# Patient Record
Sex: MALE | Race: BLACK OR AFRICAN AMERICAN | NOT HISPANIC OR LATINO | Employment: OTHER | URBAN - METROPOLITAN AREA
[De-identification: names, ages, dates, MRNs, and addresses within clinical notes are randomized per-mention and may not be internally consistent; named-entity substitution may affect disease eponyms.]

---

## 2023-02-07 PROBLEM — I48.91 AFIB (MULTI): Status: ACTIVE | Noted: 2023-02-07

## 2023-02-07 PROBLEM — R42 VERTIGO: Status: ACTIVE | Noted: 2023-02-07

## 2023-02-07 PROBLEM — G56.03 CARPAL TUNNEL SYNDROME ON BOTH SIDES: Status: ACTIVE | Noted: 2023-02-07

## 2023-02-07 PROBLEM — M54.16 LUMBAR RADICULOPATHY: Status: ACTIVE | Noted: 2023-02-07

## 2023-02-07 PROBLEM — E78.5 HYPERLIPIDEMIA: Status: ACTIVE | Noted: 2023-02-07

## 2023-02-07 PROBLEM — H53.2 DIPLOPIA: Status: ACTIVE | Noted: 2023-02-07

## 2023-02-07 PROBLEM — H52.4 ASTIGMATISM OF BOTH EYES WITH PRESBYOPIA: Status: ACTIVE | Noted: 2023-02-07

## 2023-02-07 PROBLEM — I10 HYPERTENSION: Status: ACTIVE | Noted: 2023-02-07

## 2023-02-07 PROBLEM — R10.33 ABDOMINAL PAIN, PERIUMBILICAL: Status: ACTIVE | Noted: 2023-02-07

## 2023-02-07 PROBLEM — I35.0 NONRHEUMATIC AORTIC VALVE STENOSIS: Status: ACTIVE | Noted: 2023-02-07

## 2023-02-07 PROBLEM — N40.0 BPH (BENIGN PROSTATIC HYPERPLASIA): Status: ACTIVE | Noted: 2023-02-07

## 2023-02-07 PROBLEM — K63.5 COLON POLYPS: Status: ACTIVE | Noted: 2023-02-07

## 2023-02-07 PROBLEM — R63.4 WEIGHT LOSS: Status: ACTIVE | Noted: 2023-02-07

## 2023-02-07 PROBLEM — M48.061 LUMBAR STENOSIS: Status: ACTIVE | Noted: 2023-02-07

## 2023-02-07 PROBLEM — I25.10 ATHEROSCLEROSIS OF CORONARY ARTERY: Status: ACTIVE | Noted: 2023-02-07

## 2023-02-07 PROBLEM — E55.9 VITAMIN D DEFICIENCY: Status: ACTIVE | Noted: 2023-02-07

## 2023-02-07 PROBLEM — E11.9 DIABETES MELLITUS (MULTI): Status: ACTIVE | Noted: 2023-02-07

## 2023-02-07 PROBLEM — H52.03 HYPERMETROPIA OF BOTH EYES: Status: ACTIVE | Noted: 2023-02-07

## 2023-02-07 PROBLEM — I26.99 MULTIPLE PULMONARY EMBOLI (MULTI): Status: ACTIVE | Noted: 2023-02-07

## 2023-02-07 PROBLEM — K40.90 INGUINAL HERNIA: Status: RESOLVED | Noted: 2023-02-07 | Resolved: 2023-02-07

## 2023-02-07 PROBLEM — R20.0 BILATERAL HAND NUMBNESS: Status: ACTIVE | Noted: 2023-02-07

## 2023-02-07 PROBLEM — M54.2 NECK PAIN: Status: ACTIVE | Noted: 2023-02-07

## 2023-02-07 PROBLEM — N52.9 ERECTILE DYSFUNCTION: Status: ACTIVE | Noted: 2023-02-07

## 2023-02-07 PROBLEM — H52.203 ASTIGMATISM OF BOTH EYES WITH PRESBYOPIA: Status: ACTIVE | Noted: 2023-02-07

## 2023-02-07 PROBLEM — K92.1 MELENA: Status: ACTIVE | Noted: 2023-02-07

## 2023-02-07 PROBLEM — I82.409 DVT (DEEP VENOUS THROMBOSIS) (MULTI): Status: ACTIVE | Noted: 2023-02-07

## 2023-02-07 PROBLEM — M47.12 OTHER SPONDYLOSIS WITH MYELOPATHY, CERVICAL REGION: Status: RESOLVED | Noted: 2023-02-07 | Resolved: 2023-02-07

## 2023-02-07 PROBLEM — E87.6 HYPOKALEMIA: Status: ACTIVE | Noted: 2023-02-07

## 2023-02-07 PROBLEM — K21.9 GERD WITHOUT ESOPHAGITIS: Status: ACTIVE | Noted: 2023-02-07

## 2023-02-07 PROBLEM — K76.0 FATTY LIVER: Status: ACTIVE | Noted: 2023-02-07

## 2023-02-07 PROBLEM — K42.9 UMBILICAL HERNIA: Status: ACTIVE | Noted: 2023-02-07

## 2023-02-07 PROBLEM — E66.9 OBESITY (BMI 30-39.9): Status: ACTIVE | Noted: 2023-02-07

## 2023-02-07 PROBLEM — K92.2 UPPER GI BLEED: Status: ACTIVE | Noted: 2023-02-07

## 2023-02-07 PROBLEM — K22.6 MALLORY-WEISS TEAR: Status: ACTIVE | Noted: 2023-02-07

## 2023-02-07 PROBLEM — Z95.2 S/P AVR: Status: ACTIVE | Noted: 2023-02-07

## 2023-02-07 PROBLEM — R39.15 URGENCY OF URINATION: Status: ACTIVE | Noted: 2023-02-07

## 2023-02-07 PROBLEM — M48.02 STENOSIS, CERVICAL SPINE: Status: RESOLVED | Noted: 2023-02-07 | Resolved: 2023-02-07

## 2023-02-07 PROBLEM — M54.12 CERVICAL RADICULOPATHY: Status: ACTIVE | Noted: 2023-02-07

## 2023-02-07 PROBLEM — M54.50 LOW BACK PAIN: Status: ACTIVE | Noted: 2023-02-07

## 2023-02-07 PROBLEM — I63.9 CEREBROVASCULAR ACCIDENT (CVA) (MULTI): Status: ACTIVE | Noted: 2023-02-07

## 2023-02-07 PROBLEM — D35.00 ADRENAL ADENOMA: Status: ACTIVE | Noted: 2023-02-07

## 2023-02-07 RX ORDER — FINASTERIDE 5 MG/1
1 TABLET, FILM COATED ORAL DAILY
COMMUNITY
Start: 2019-04-19 | End: 2023-05-15 | Stop reason: SDUPTHER

## 2023-02-07 RX ORDER — AMLODIPINE BESYLATE 5 MG/1
5 TABLET ORAL DAILY
COMMUNITY
Start: 2018-06-06 | End: 2023-07-16

## 2023-02-07 RX ORDER — TOPIRAMATE 100 MG/1
100 TABLET, FILM COATED ORAL 2 TIMES DAILY
COMMUNITY
Start: 2021-03-22 | End: 2023-10-06

## 2023-02-07 RX ORDER — SPIRONOLACTONE 25 MG/1
100 TABLET ORAL DAILY
COMMUNITY
Start: 2020-07-01 | End: 2023-03-16 | Stop reason: SDUPTHER

## 2023-02-07 RX ORDER — ATORVASTATIN CALCIUM 80 MG/1
1 TABLET, FILM COATED ORAL DAILY
COMMUNITY
Start: 2013-07-20 | End: 2023-05-16 | Stop reason: SDUPTHER

## 2023-02-07 RX ORDER — EZETIMIBE 10 MG/1
1 TABLET ORAL DAILY
COMMUNITY
Start: 2022-03-25 | End: 2023-08-22 | Stop reason: SDUPTHER

## 2023-02-07 RX ORDER — CHLORTHALIDONE 25 MG/1
1 TABLET ORAL DAILY
COMMUNITY
Start: 2022-03-25 | End: 2023-08-22 | Stop reason: SDUPTHER

## 2023-02-07 RX ORDER — LOSARTAN POTASSIUM 100 MG/1
100 TABLET ORAL DAILY
COMMUNITY
Start: 2018-03-29 | End: 2023-05-15 | Stop reason: SDUPTHER

## 2023-02-07 RX ORDER — PANTOPRAZOLE SODIUM 40 MG/1
1 TABLET, DELAYED RELEASE ORAL DAILY
COMMUNITY
Start: 2020-10-12 | End: 2023-07-16

## 2023-03-16 DIAGNOSIS — I10 PRIMARY HYPERTENSION: Primary | ICD-10-CM

## 2023-03-16 RX ORDER — SPIRONOLACTONE 25 MG/1
100 TABLET ORAL DAILY
Qty: 30 TABLET | Refills: 0 | Status: SHIPPED | OUTPATIENT
Start: 2023-03-16 | End: 2023-08-22 | Stop reason: SDUPTHER

## 2023-04-06 ENCOUNTER — OFFICE VISIT (OUTPATIENT)
Dept: PRIMARY CARE | Facility: CLINIC | Age: 78
End: 2023-04-06
Payer: MEDICARE

## 2023-04-06 ENCOUNTER — APPOINTMENT (OUTPATIENT)
Dept: PRIMARY CARE | Facility: CLINIC | Age: 78
End: 2023-04-06
Payer: MEDICARE

## 2023-04-06 VITALS
WEIGHT: 235.5 LBS | DIASTOLIC BLOOD PRESSURE: 69 MMHG | TEMPERATURE: 97.1 F | BODY MASS INDEX: 32.97 KG/M2 | HEIGHT: 71 IN | SYSTOLIC BLOOD PRESSURE: 123 MMHG | HEART RATE: 74 BPM

## 2023-04-06 DIAGNOSIS — N40.1 BENIGN PROSTATIC HYPERPLASIA WITH LOWER URINARY TRACT SYMPTOMS, SYMPTOM DETAILS UNSPECIFIED: ICD-10-CM

## 2023-04-06 DIAGNOSIS — I48.11 LONGSTANDING PERSISTENT ATRIAL FIBRILLATION (MULTI): ICD-10-CM

## 2023-04-06 DIAGNOSIS — Z95.2 S/P AVR: ICD-10-CM

## 2023-04-06 DIAGNOSIS — Z79.4 TYPE 2 DIABETES MELLITUS WITH OTHER SPECIFIED COMPLICATION, WITH LONG-TERM CURRENT USE OF INSULIN (MULTI): Primary | ICD-10-CM

## 2023-04-06 DIAGNOSIS — M54.16 LUMBAR RADICULOPATHY: ICD-10-CM

## 2023-04-06 DIAGNOSIS — K21.9 GERD WITHOUT ESOPHAGITIS: ICD-10-CM

## 2023-04-06 DIAGNOSIS — Z12.11 ENCOUNTER FOR SCREENING FOR MALIGNANT NEOPLASM OF COLON: ICD-10-CM

## 2023-04-06 DIAGNOSIS — I10 PRIMARY HYPERTENSION: ICD-10-CM

## 2023-04-06 DIAGNOSIS — R20.0 BILATERAL HAND NUMBNESS: ICD-10-CM

## 2023-04-06 DIAGNOSIS — I25.83 CORONARY ATHEROSCLEROSIS DUE TO LIPID RICH PLAQUE: ICD-10-CM

## 2023-04-06 DIAGNOSIS — E11.69 TYPE 2 DIABETES MELLITUS WITH OTHER SPECIFIED COMPLICATION, WITH LONG-TERM CURRENT USE OF INSULIN (MULTI): Primary | ICD-10-CM

## 2023-04-06 DIAGNOSIS — I63.9 CEREBROVASCULAR ACCIDENT (CVA), UNSPECIFIED MECHANISM (MULTI): ICD-10-CM

## 2023-04-06 DIAGNOSIS — I25.10 CORONARY ATHEROSCLEROSIS DUE TO LIPID RICH PLAQUE: ICD-10-CM

## 2023-04-06 DIAGNOSIS — Z12.5 PROSTATE CANCER SCREENING: ICD-10-CM

## 2023-04-06 PROCEDURE — 1036F TOBACCO NON-USER: CPT | Performed by: FAMILY MEDICINE

## 2023-04-06 PROCEDURE — 4010F ACE/ARB THERAPY RXD/TAKEN: CPT | Performed by: FAMILY MEDICINE

## 2023-04-06 PROCEDURE — 99214 OFFICE O/P EST MOD 30 MIN: CPT | Performed by: FAMILY MEDICINE

## 2023-04-06 PROCEDURE — 3078F DIAST BP <80 MM HG: CPT | Performed by: FAMILY MEDICINE

## 2023-04-06 PROCEDURE — 3074F SYST BP LT 130 MM HG: CPT | Performed by: FAMILY MEDICINE

## 2023-04-06 PROCEDURE — 1159F MED LIST DOCD IN RCRD: CPT | Performed by: FAMILY MEDICINE

## 2023-04-06 ASSESSMENT — PAIN SCALES - GENERAL: PAINLEVEL: 0-NO PAIN

## 2023-04-06 NOTE — PROGRESS NOTES
Subjective   Patient ID: Cristy Forte is a 77 y.o. male who presents for No chief complaint on file..  HPI  The patient is here to establish care and for medication refills.     A review of system was completed.  All systems were reviewed and were normal, except for the ones that are listed in the HPI.    Objective   Physical Exam  Constitutional:       Appearance: Normal appearance.   HENT:      Head: Normocephalic and atraumatic.      Right Ear: Tympanic membrane, ear canal and external ear normal.      Left Ear: Tympanic membrane, ear canal and external ear normal.      Nose: Nose normal.      Mouth/Throat:      Mouth: Mucous membranes are moist.      Pharynx: Oropharynx is clear.   Eyes:      Extraocular Movements: Extraocular movements intact.      Conjunctiva/sclera: Conjunctivae normal.      Pupils: Pupils are equal, round, and reactive to light.   Cardiovascular:      Rate and Rhythm: Normal rate and regular rhythm.      Pulses: Normal pulses.   Pulmonary:      Effort: Pulmonary effort is normal.      Breath sounds: Normal breath sounds.   Abdominal:      General: Abdomen is flat. Bowel sounds are normal.      Palpations: Abdomen is soft.   Musculoskeletal:         General: Normal range of motion.      Cervical back: Normal range of motion and neck supple.   Skin:     General: Skin is warm.   Neurological:      General: No focal deficit present.      Mental Status: He is alert and oriented to person, place, and time. Mental status is at baseline.   Psychiatric:         Mood and Affect: Mood normal.         Behavior: Behavior normal.         Thought Content: Thought content normal.         Judgment: Judgment normal.         Assessment/Plan   Problem List Items Addressed This Visit          Nervous    Bilateral hand numbness    Cerebrovascular accident (CVA) (CMS/[facility])    Lumbar radiculopathy       Circulatory    Afib (CMS/[facility])    Atherosclerosis of coronary artery    Hypertension    S/P AVR        Digestive    GERD without esophagitis       Genitourinary    BPH (benign prostatic hyperplasia)       Endocrine/Metabolic    Diabetes mellitus (CMS/HCC) - Primary    Relevant Orders    Hemoglobin A1C    Lipid Panel    TSH with reflex to Free T4 if abnormal    CBC    Comprehensive Metabolic Panel    Albumin , Urine Random    Referral to Diabetic Education       Other    Encounter for screening for malignant neoplasm of colon    Relevant Orders    Fecal Occult Blood Immunoassy    Prostate cancer screening    Relevant Orders    PSA, Total and Free

## 2023-04-21 ENCOUNTER — LAB (OUTPATIENT)
Dept: LAB | Facility: LAB | Age: 78
End: 2023-04-21
Payer: MEDICARE

## 2023-04-21 DIAGNOSIS — D64.9 ANEMIA, UNSPECIFIED TYPE: Primary | ICD-10-CM

## 2023-04-21 DIAGNOSIS — E11.69 TYPE 2 DIABETES MELLITUS WITH OTHER SPECIFIED COMPLICATION, WITH LONG-TERM CURRENT USE OF INSULIN (MULTI): ICD-10-CM

## 2023-04-21 DIAGNOSIS — Z12.5 PROSTATE CANCER SCREENING: ICD-10-CM

## 2023-04-21 DIAGNOSIS — Z79.4 TYPE 2 DIABETES MELLITUS WITH OTHER SPECIFIED COMPLICATION, WITH LONG-TERM CURRENT USE OF INSULIN (MULTI): ICD-10-CM

## 2023-04-21 LAB
ALANINE AMINOTRANSFERASE (SGPT) (U/L) IN SER/PLAS: 29 U/L (ref 10–52)
ALBUMIN (G/DL) IN SER/PLAS: 4.1 G/DL (ref 3.4–5)
ALBUMIN (MG/L) IN URINE: <7 MG/L
ALBUMIN/CREATININE (UG/MG) IN URINE: NORMAL UG/MG CRT (ref 0–30)
ALKALINE PHOSPHATASE (U/L) IN SER/PLAS: 60 U/L (ref 33–136)
ANION GAP IN SER/PLAS: 12 MMOL/L (ref 10–20)
ASPARTATE AMINOTRANSFERASE (SGOT) (U/L) IN SER/PLAS: 25 U/L (ref 9–39)
BILIRUBIN TOTAL (MG/DL) IN SER/PLAS: 0.6 MG/DL (ref 0–1.2)
CALCIUM (MG/DL) IN SER/PLAS: 8.7 MG/DL (ref 8.6–10.6)
CARBON DIOXIDE, TOTAL (MMOL/L) IN SER/PLAS: 28 MMOL/L (ref 21–32)
CHLORIDE (MMOL/L) IN SER/PLAS: 106 MMOL/L (ref 98–107)
CHOLESTEROL (MG/DL) IN SER/PLAS: 103 MG/DL (ref 0–199)
CHOLESTEROL IN HDL (MG/DL) IN SER/PLAS: 42 MG/DL
CHOLESTEROL/HDL RATIO: 2.5
CREATININE (MG/DL) IN SER/PLAS: 1.22 MG/DL (ref 0.5–1.3)
CREATININE (MG/DL) IN URINE: 202 MG/DL (ref 20–370)
ERYTHROCYTE DISTRIBUTION WIDTH (RATIO) BY AUTOMATED COUNT: 15.8 % (ref 11.5–14.5)
ERYTHROCYTE MEAN CORPUSCULAR HEMOGLOBIN CONCENTRATION (G/DL) BY AUTOMATED: 31.2 G/DL (ref 32–36)
ERYTHROCYTE MEAN CORPUSCULAR VOLUME (FL) BY AUTOMATED COUNT: 82 FL (ref 80–100)
ERYTHROCYTES (10*6/UL) IN BLOOD BY AUTOMATED COUNT: 5.1 X10E12/L (ref 4.5–5.9)
ESTIMATED AVERAGE GLUCOSE FOR HBA1C: 137 MG/DL
GFR MALE: 62 ML/MIN/1.73M2
GLUCOSE (MG/DL) IN SER/PLAS: 101 MG/DL (ref 74–99)
HEMATOCRIT (%) IN BLOOD BY AUTOMATED COUNT: 41.7 % (ref 41–52)
HEMOGLOBIN (G/DL) IN BLOOD: 13 G/DL (ref 13.5–17.5)
HEMOGLOBIN A1C/HEMOGLOBIN TOTAL IN BLOOD: 6.4 %
LDL: 42 MG/DL (ref 0–99)
LEUKOCYTES (10*3/UL) IN BLOOD BY AUTOMATED COUNT: 4.9 X10E9/L (ref 4.4–11.3)
NRBC (PER 100 WBCS) BY AUTOMATED COUNT: 0 /100 WBC (ref 0–0)
PLATELETS (10*3/UL) IN BLOOD AUTOMATED COUNT: 155 X10E9/L (ref 150–450)
POTASSIUM (MMOL/L) IN SER/PLAS: 2.9 MMOL/L (ref 3.5–5.3)
PROTEIN TOTAL: 6.8 G/DL (ref 6.4–8.2)
SODIUM (MMOL/L) IN SER/PLAS: 143 MMOL/L (ref 136–145)
THYROTROPIN (MIU/L) IN SER/PLAS BY DETECTION LIMIT <= 0.05 MIU/L: 2.52 MIU/L (ref 0.44–3.98)
TRIGLYCERIDE (MG/DL) IN SER/PLAS: 93 MG/DL (ref 0–149)
UREA NITROGEN (MG/DL) IN SER/PLAS: 16 MG/DL (ref 6–23)
VLDL: 19 MG/DL (ref 0–40)

## 2023-04-21 PROCEDURE — 80053 COMPREHEN METABOLIC PANEL: CPT

## 2023-04-21 PROCEDURE — 82043 UR ALBUMIN QUANTITATIVE: CPT

## 2023-04-21 PROCEDURE — 36415 COLL VENOUS BLD VENIPUNCTURE: CPT

## 2023-04-21 PROCEDURE — 83036 HEMOGLOBIN GLYCOSYLATED A1C: CPT

## 2023-04-21 PROCEDURE — 84154 ASSAY OF PSA FREE: CPT

## 2023-04-21 PROCEDURE — 82570 ASSAY OF URINE CREATININE: CPT

## 2023-04-21 PROCEDURE — G0103 PSA SCREENING: HCPCS

## 2023-04-21 PROCEDURE — 80061 LIPID PANEL: CPT

## 2023-04-21 PROCEDURE — 84443 ASSAY THYROID STIM HORMONE: CPT

## 2023-04-21 PROCEDURE — 85027 COMPLETE CBC AUTOMATED: CPT

## 2023-04-24 LAB
PROSTATE SPECIFIC AG (NG/ML) IN SER/PLAS: 0.8 NG/ML (ref 0–4)
PROSTATE SPECIFIC AG FREE (NG/ML) IN SER/PLAS: 0.4 NG/ML
PROSTATE SPECIFIC AG FREE/PROSTATE SPECIFIC AG TOTAL IN SER/PLAS: 50 %

## 2023-05-12 ENCOUNTER — APPOINTMENT (OUTPATIENT)
Dept: PRIMARY CARE | Facility: CLINIC | Age: 78
End: 2023-05-12
Payer: MEDICARE

## 2023-05-15 DIAGNOSIS — I25.83 CORONARY ATHEROSCLEROSIS DUE TO LIPID RICH PLAQUE: ICD-10-CM

## 2023-05-15 DIAGNOSIS — I10 PRIMARY HYPERTENSION: Primary | ICD-10-CM

## 2023-05-15 DIAGNOSIS — I25.10 CORONARY ATHEROSCLEROSIS DUE TO LIPID RICH PLAQUE: ICD-10-CM

## 2023-05-15 DIAGNOSIS — N40.1 BENIGN PROSTATIC HYPERPLASIA WITH LOWER URINARY TRACT SYMPTOMS, SYMPTOM DETAILS UNSPECIFIED: ICD-10-CM

## 2023-05-15 NOTE — TELEPHONE ENCOUNTER
[facility] CALLED REQUESTING A REFILL FOR LOSARTAN 100MG , ATORVASTATIN 80 MG AND FINASTERIDE 5MG.

## 2023-05-18 RX ORDER — LOSARTAN POTASSIUM 100 MG/1
100 TABLET ORAL DAILY
Qty: 90 TABLET | Refills: 1 | Status: SHIPPED | OUTPATIENT
Start: 2023-05-18 | End: 2023-08-22

## 2023-05-18 RX ORDER — FINASTERIDE 5 MG/1
5 TABLET, FILM COATED ORAL DAILY
Qty: 90 TABLET | Refills: 1 | Status: SHIPPED | OUTPATIENT
Start: 2023-05-18 | End: 2023-10-06 | Stop reason: SDUPTHER

## 2023-05-18 RX ORDER — ATORVASTATIN CALCIUM 80 MG/1
80 TABLET, FILM COATED ORAL DAILY
Qty: 90 TABLET | Refills: 1 | Status: SHIPPED | OUTPATIENT
Start: 2023-05-18 | End: 2023-10-06 | Stop reason: SDUPTHER

## 2023-06-06 DIAGNOSIS — I10 PRIMARY HYPERTENSION: Primary | ICD-10-CM

## 2023-06-06 RX ORDER — POTASSIUM CHLORIDE 1500 MG/1
20 TABLET, EXTENDED RELEASE ORAL DAILY
COMMUNITY
End: 2023-06-06 | Stop reason: SDUPTHER

## 2023-06-11 RX ORDER — POTASSIUM CHLORIDE 1500 MG/1
20 TABLET, EXTENDED RELEASE ORAL DAILY
Qty: 90 TABLET | Refills: 1 | Status: SHIPPED | OUTPATIENT
Start: 2023-06-11 | End: 2023-07-24 | Stop reason: SDUPTHER

## 2023-07-24 ENCOUNTER — TELEPHONE (OUTPATIENT)
Dept: PRIMARY CARE | Facility: CLINIC | Age: 78
End: 2023-07-24
Payer: MEDICARE

## 2023-08-22 DIAGNOSIS — I10 PRIMARY HYPERTENSION: ICD-10-CM

## 2023-08-22 RX ORDER — SPIRONOLACTONE 25 MG/1
25 TABLET ORAL DAILY
Qty: 90 TABLET | Refills: 1 | Status: SHIPPED | OUTPATIENT
Start: 2023-08-22 | End: 2023-10-06 | Stop reason: SDUPTHER

## 2023-10-06 ENCOUNTER — OFFICE VISIT (OUTPATIENT)
Dept: PRIMARY CARE | Facility: CLINIC | Age: 78
End: 2023-10-06
Payer: MEDICARE

## 2023-10-06 VITALS
HEART RATE: 71 BPM | TEMPERATURE: 98 F | DIASTOLIC BLOOD PRESSURE: 66 MMHG | SYSTOLIC BLOOD PRESSURE: 111 MMHG | BODY MASS INDEX: 31.97 KG/M2 | WEIGHT: 226 LBS

## 2023-10-06 DIAGNOSIS — I10 PRIMARY HYPERTENSION: ICD-10-CM

## 2023-10-06 DIAGNOSIS — K21.9 GERD WITHOUT ESOPHAGITIS: ICD-10-CM

## 2023-10-06 DIAGNOSIS — E78.00 PURE HYPERCHOLESTEROLEMIA: ICD-10-CM

## 2023-10-06 DIAGNOSIS — Z00.00 MEDICARE ANNUAL WELLNESS VISIT, SUBSEQUENT: Primary | ICD-10-CM

## 2023-10-06 DIAGNOSIS — N40.1 BENIGN PROSTATIC HYPERPLASIA WITH LOWER URINARY TRACT SYMPTOMS, SYMPTOM DETAILS UNSPECIFIED: ICD-10-CM

## 2023-10-06 DIAGNOSIS — M54.12 CERVICAL RADICULOPATHY: ICD-10-CM

## 2023-10-06 DIAGNOSIS — E11.69 TYPE 2 DIABETES MELLITUS WITH OTHER SPECIFIED COMPLICATION, WITH LONG-TERM CURRENT USE OF INSULIN (MULTI): ICD-10-CM

## 2023-10-06 DIAGNOSIS — I25.83 CORONARY ATHEROSCLEROSIS DUE TO LIPID RICH PLAQUE: ICD-10-CM

## 2023-10-06 DIAGNOSIS — M25.50 ARTHRALGIA OF MULTIPLE JOINTS: ICD-10-CM

## 2023-10-06 DIAGNOSIS — Z12.5 PROSTATE CANCER SCREENING: ICD-10-CM

## 2023-10-06 DIAGNOSIS — I26.99 MULTIPLE PULMONARY EMBOLI (MULTI): ICD-10-CM

## 2023-10-06 DIAGNOSIS — Z23 IMMUNIZATION DUE: ICD-10-CM

## 2023-10-06 DIAGNOSIS — I25.10 CORONARY ATHEROSCLEROSIS DUE TO LIPID RICH PLAQUE: ICD-10-CM

## 2023-10-06 DIAGNOSIS — Z79.4 TYPE 2 DIABETES MELLITUS WITH OTHER SPECIFIED COMPLICATION, WITH LONG-TERM CURRENT USE OF INSULIN (MULTI): ICD-10-CM

## 2023-10-06 DIAGNOSIS — I63.9 CEREBROVASCULAR ACCIDENT (CVA), UNSPECIFIED MECHANISM (MULTI): ICD-10-CM

## 2023-10-06 PROCEDURE — 3074F SYST BP LT 130 MM HG: CPT | Performed by: FAMILY MEDICINE

## 2023-10-06 PROCEDURE — 1159F MED LIST DOCD IN RCRD: CPT | Performed by: FAMILY MEDICINE

## 2023-10-06 PROCEDURE — 4010F ACE/ARB THERAPY RXD/TAKEN: CPT | Performed by: FAMILY MEDICINE

## 2023-10-06 PROCEDURE — 1036F TOBACCO NON-USER: CPT | Performed by: FAMILY MEDICINE

## 2023-10-06 PROCEDURE — G0439 PPPS, SUBSEQ VISIT: HCPCS | Performed by: FAMILY MEDICINE

## 2023-10-06 PROCEDURE — 3044F HG A1C LEVEL LT 7.0%: CPT | Performed by: FAMILY MEDICINE

## 2023-10-06 PROCEDURE — 1126F AMNT PAIN NOTED NONE PRSNT: CPT | Performed by: FAMILY MEDICINE

## 2023-10-06 PROCEDURE — 3078F DIAST BP <80 MM HG: CPT | Performed by: FAMILY MEDICINE

## 2023-10-06 RX ORDER — GABAPENTIN 100 MG/1
100 CAPSULE ORAL 2 TIMES DAILY
Qty: 120 CAPSULE | Refills: 5 | Status: SHIPPED | OUTPATIENT
Start: 2023-10-06 | End: 2023-12-05 | Stop reason: SDUPTHER

## 2023-10-06 RX ORDER — SPIRONOLACTONE 25 MG/1
25 TABLET ORAL DAILY
Qty: 90 TABLET | Refills: 1 | Status: SHIPPED | OUTPATIENT
Start: 2023-10-06 | End: 2023-12-05 | Stop reason: SDUPTHER

## 2023-10-06 RX ORDER — CHLORTHALIDONE 25 MG/1
25 TABLET ORAL DAILY
Qty: 90 TABLET | Refills: 1 | Status: SHIPPED | OUTPATIENT
Start: 2023-10-06 | End: 2024-01-18

## 2023-10-06 RX ORDER — TOPIRAMATE 25 MG/1
25 TABLET ORAL 2 TIMES DAILY
Qty: 60 TABLET | Refills: 5 | Status: SHIPPED | OUTPATIENT
Start: 2023-10-06 | End: 2023-10-06

## 2023-10-06 RX ORDER — AMLODIPINE BESYLATE 5 MG/1
5 TABLET ORAL DAILY
Qty: 90 TABLET | Refills: 0 | Status: SHIPPED | OUTPATIENT
Start: 2023-10-06 | End: 2023-11-10

## 2023-10-06 RX ORDER — METFORMIN HYDROCHLORIDE 500 MG/1
500 TABLET, EXTENDED RELEASE ORAL
Qty: 90 TABLET | Refills: 1 | Status: SHIPPED | OUTPATIENT
Start: 2023-10-06 | End: 2023-12-05 | Stop reason: SDUPTHER

## 2023-10-06 RX ORDER — FINASTERIDE 5 MG/1
5 TABLET, FILM COATED ORAL DAILY
Qty: 90 TABLET | Refills: 1 | Status: SHIPPED | OUTPATIENT
Start: 2023-10-06 | End: 2024-05-20

## 2023-10-06 RX ORDER — PANTOPRAZOLE SODIUM 40 MG/1
40 TABLET, DELAYED RELEASE ORAL
Qty: 90 TABLET | Refills: 1 | Status: SHIPPED | OUTPATIENT
Start: 2023-10-06 | End: 2023-12-05 | Stop reason: SDUPTHER

## 2023-10-06 RX ORDER — POTASSIUM CHLORIDE 20 MEQ/1
20 TABLET, EXTENDED RELEASE ORAL DAILY
Qty: 90 TABLET | Refills: 0 | Status: SHIPPED | OUTPATIENT
Start: 2023-10-06 | End: 2024-01-17

## 2023-10-06 RX ORDER — EZETIMIBE 10 MG/1
10 TABLET ORAL DAILY
Qty: 30 TABLET | Refills: 0 | Status: SHIPPED | OUTPATIENT
Start: 2023-10-06 | End: 2023-12-02

## 2023-10-06 RX ORDER — LOSARTAN POTASSIUM 100 MG/1
100 TABLET ORAL DAILY
Qty: 90 TABLET | Refills: 1 | Status: SHIPPED | OUTPATIENT
Start: 2023-10-06 | End: 2024-05-20

## 2023-10-06 RX ORDER — ATORVASTATIN CALCIUM 80 MG/1
80 TABLET, FILM COATED ORAL DAILY
Qty: 90 TABLET | Refills: 1 | Status: SHIPPED | OUTPATIENT
Start: 2023-10-06 | End: 2023-12-05 | Stop reason: SDUPTHER

## 2023-10-06 NOTE — ASSESSMENT & PLAN NOTE
-Blood test ordered.  -Influenza, COVID and RSV UTD.  Shingrix and Tdap sent to his pharmacy.  -Eye exam 2023.  -Colonoscopy 2021.  No need for repeat per GI.

## 2023-10-06 NOTE — ASSESSMENT & PLAN NOTE
-Topiramate stopped- ineffective.  -Gabapentin 100 mg to titrate up to 300 mg BID started today.  Side effects discussed.   -4- 6 weeks follow-up recommended.

## 2023-10-06 NOTE — ASSESSMENT & PLAN NOTE
-eye exam 7/2023.  -feet intact.  -Blood and urine test ordered.  -Immunizations discussed in the MWV section.  -Metformin  mg QD started today.

## 2023-10-11 ENCOUNTER — LAB (OUTPATIENT)
Dept: LAB | Facility: LAB | Age: 78
End: 2023-10-11
Payer: MEDICARE

## 2023-10-11 DIAGNOSIS — Z12.5 PROSTATE CANCER SCREENING: ICD-10-CM

## 2023-10-11 DIAGNOSIS — D64.9 ANEMIA, UNSPECIFIED TYPE: ICD-10-CM

## 2023-10-11 DIAGNOSIS — E11.69 TYPE 2 DIABETES MELLITUS WITH OTHER SPECIFIED COMPLICATION, WITH LONG-TERM CURRENT USE OF INSULIN (MULTI): ICD-10-CM

## 2023-10-11 DIAGNOSIS — Z79.4 TYPE 2 DIABETES MELLITUS WITH OTHER SPECIFIED COMPLICATION, WITH LONG-TERM CURRENT USE OF INSULIN (MULTI): ICD-10-CM

## 2023-10-11 DIAGNOSIS — M25.50 ARTHRALGIA OF MULTIPLE JOINTS: ICD-10-CM

## 2023-10-11 LAB
ALBUMIN SERPL BCP-MCNC: 4.1 G/DL (ref 3.4–5)
ALP SERPL-CCNC: 66 U/L (ref 33–136)
ALT SERPL W P-5'-P-CCNC: 30 U/L (ref 10–52)
ANION GAP SERPL CALC-SCNC: 14 MMOL/L (ref 10–20)
AST SERPL W P-5'-P-CCNC: 27 U/L (ref 9–39)
BILIRUB SERPL-MCNC: 0.6 MG/DL (ref 0–1.2)
BUN SERPL-MCNC: 17 MG/DL (ref 6–23)
CALCIUM SERPL-MCNC: 9.6 MG/DL (ref 8.6–10.6)
CHLORIDE SERPL-SCNC: 104 MMOL/L (ref 98–107)
CHOLEST SERPL-MCNC: 111 MG/DL (ref 0–199)
CHOLESTEROL/HDL RATIO: 2.5
CO2 SERPL-SCNC: 30 MMOL/L (ref 21–32)
CREAT SERPL-MCNC: 1.02 MG/DL (ref 0.5–1.3)
CREAT UR-MCNC: 138.6 MG/DL (ref 20–370)
CRP SERPL-MCNC: 0.24 MG/DL
ERYTHROCYTE [DISTWIDTH] IN BLOOD BY AUTOMATED COUNT: 15.5 % (ref 11.5–14.5)
ERYTHROCYTE [SEDIMENTATION RATE] IN BLOOD BY WESTERGREN METHOD: 21 MM/H (ref 0–20)
EST. AVERAGE GLUCOSE BLD GHB EST-MCNC: 134 MG/DL
GFR SERPL CREATININE-BSD FRML MDRD: 77 ML/MIN/1.73M*2
GLUCOSE SERPL-MCNC: 101 MG/DL (ref 74–99)
HBA1C MFR BLD: 6.3 %
HCT VFR BLD AUTO: 45.1 % (ref 41–52)
HDLC SERPL-MCNC: 44.7 MG/DL
HGB BLD-MCNC: 13.7 G/DL (ref 13.5–17.5)
LDLC SERPL CALC-MCNC: 49 MG/DL (ref 140–190)
MCH RBC QN AUTO: 25.6 PG (ref 26–34)
MCHC RBC AUTO-ENTMCNC: 30.4 G/DL (ref 32–36)
MCV RBC AUTO: 84 FL (ref 80–100)
MICROALBUMIN UR-MCNC: <7 MG/L
MICROALBUMIN/CREAT UR: NORMAL MG/G{CREAT}
NON HDL CHOLESTEROL: 66 MG/DL (ref 0–149)
NRBC BLD-RTO: 0 /100 WBCS (ref 0–0)
PLATELET # BLD AUTO: 179 X10*3/UL (ref 150–450)
PMV BLD AUTO: 11.2 FL (ref 7.5–11.5)
POTASSIUM SERPL-SCNC: 3.8 MMOL/L (ref 3.5–5.3)
PROT SERPL-MCNC: 7.1 G/DL (ref 6.4–8.2)
PSA SERPL-MCNC: 0.95 NG/ML
RBC # BLD AUTO: 5.35 X10*6/UL (ref 4.5–5.9)
RHEUMATOID FACT SER NEPH-ACNC: <10 IU/ML (ref 0–15)
SODIUM SERPL-SCNC: 144 MMOL/L (ref 136–145)
TRIGL SERPL-MCNC: 88 MG/DL (ref 0–149)
TSH SERPL-ACNC: 1.98 MIU/L (ref 0.44–3.98)
URATE SERPL-MCNC: 4.6 MG/DL (ref 4–7.5)
VLDL: 18 MG/DL (ref 0–40)
WBC # BLD AUTO: 6.2 X10*3/UL (ref 4.4–11.3)

## 2023-10-11 PROCEDURE — 80053 COMPREHEN METABOLIC PANEL: CPT

## 2023-10-11 PROCEDURE — 82570 ASSAY OF URINE CREATININE: CPT

## 2023-10-11 PROCEDURE — 85027 COMPLETE CBC AUTOMATED: CPT

## 2023-10-11 PROCEDURE — 86225 DNA ANTIBODY NATIVE: CPT

## 2023-10-11 PROCEDURE — 84550 ASSAY OF BLOOD/URIC ACID: CPT

## 2023-10-11 PROCEDURE — 86431 RHEUMATOID FACTOR QUANT: CPT

## 2023-10-11 PROCEDURE — 36415 COLL VENOUS BLD VENIPUNCTURE: CPT

## 2023-10-11 PROCEDURE — 83036 HEMOGLOBIN GLYCOSYLATED A1C: CPT

## 2023-10-11 PROCEDURE — G0103 PSA SCREENING: HCPCS

## 2023-10-11 PROCEDURE — 82043 UR ALBUMIN QUANTITATIVE: CPT

## 2023-10-11 PROCEDURE — 86140 C-REACTIVE PROTEIN: CPT

## 2023-10-11 PROCEDURE — 84443 ASSAY THYROID STIM HORMONE: CPT

## 2023-10-11 PROCEDURE — 80061 LIPID PANEL: CPT

## 2023-10-11 PROCEDURE — 86235 NUCLEAR ANTIGEN ANTIBODY: CPT

## 2023-10-11 PROCEDURE — 86038 ANTINUCLEAR ANTIBODIES: CPT

## 2023-10-11 PROCEDURE — 85652 RBC SED RATE AUTOMATED: CPT

## 2023-10-12 LAB
ANA PATTERN: ABNORMAL
ANA SER QL HEP2 SUBST: POSITIVE
ANA TITR SER IF: ABNORMAL {TITER}
CENTROMERE B AB SER-ACNC: <0.2 AI
CHROMATIN AB SERPL-ACNC: <0.2 AI
DSDNA AB SER-ACNC: <1 IU/ML
ENA JO1 AB SER QL IA: <0.2 AI
ENA RNP AB SER IA-ACNC: 0.7 AI
ENA SCL70 AB SER QL IA: <0.2 AI
ENA SM AB SER IA-ACNC: <0.2 AI
ENA SM+RNP AB SER QL IA: <0.2 AI
ENA SS-A AB SER IA-ACNC: <0.2 AI
ENA SS-B AB SER IA-ACNC: <0.2 AI
RIBOSOMAL P AB SER-ACNC: <0.2 AI

## 2023-10-13 DIAGNOSIS — R76.8 POSITIVE ANAPLASMOSIS TITER: Primary | ICD-10-CM

## 2023-10-13 LAB
PSA FREE MFR SERPL: 36 %
PSA FREE SERPL-MCNC: 0.4 NG/ML
PSA SERPL IA-MCNC: 1.1 NG/ML (ref 0–4)

## 2023-10-17 RX ORDER — FINASTERIDE 5 MG/1
5 TABLET, FILM COATED ORAL DAILY
Qty: 90 TABLET | Refills: 1 | Status: SHIPPED | OUTPATIENT
Start: 2023-10-17 | End: 2024-01-22 | Stop reason: WASHOUT

## 2023-10-30 DIAGNOSIS — I10 PRIMARY HYPERTENSION: ICD-10-CM

## 2023-11-10 RX ORDER — AMLODIPINE BESYLATE 5 MG/1
5 TABLET ORAL DAILY
Qty: 90 TABLET | Refills: 1 | Status: SHIPPED | OUTPATIENT
Start: 2023-11-10 | End: 2023-12-05 | Stop reason: SDUPTHER

## 2023-11-29 DIAGNOSIS — E78.00 PURE HYPERCHOLESTEROLEMIA: ICD-10-CM

## 2023-12-02 RX ORDER — EZETIMIBE 10 MG/1
10 TABLET ORAL DAILY
Qty: 90 TABLET | Refills: 1 | Status: SHIPPED | OUTPATIENT
Start: 2023-12-02 | End: 2024-04-30

## 2023-12-05 ENCOUNTER — OFFICE VISIT (OUTPATIENT)
Dept: PRIMARY CARE | Facility: CLINIC | Age: 78
End: 2023-12-05
Payer: MEDICARE

## 2023-12-05 VITALS
SYSTOLIC BLOOD PRESSURE: 145 MMHG | TEMPERATURE: 98 F | BODY MASS INDEX: 31.69 KG/M2 | HEART RATE: 79 BPM | DIASTOLIC BLOOD PRESSURE: 74 MMHG | WEIGHT: 224 LBS

## 2023-12-05 DIAGNOSIS — I10 PRIMARY HYPERTENSION: ICD-10-CM

## 2023-12-05 DIAGNOSIS — I26.99 MULTIPLE PULMONARY EMBOLI (MULTI): ICD-10-CM

## 2023-12-05 DIAGNOSIS — Z12.5 PROSTATE CANCER SCREENING: ICD-10-CM

## 2023-12-05 DIAGNOSIS — E11.69 TYPE 2 DIABETES MELLITUS WITH OTHER SPECIFIED COMPLICATION, WITH LONG-TERM CURRENT USE OF INSULIN (MULTI): ICD-10-CM

## 2023-12-05 DIAGNOSIS — I25.83 CORONARY ATHEROSCLEROSIS DUE TO LIPID RICH PLAQUE: ICD-10-CM

## 2023-12-05 DIAGNOSIS — M54.2 NECK PAIN: ICD-10-CM

## 2023-12-05 DIAGNOSIS — K21.9 GERD WITHOUT ESOPHAGITIS: ICD-10-CM

## 2023-12-05 DIAGNOSIS — I25.10 CORONARY ATHEROSCLEROSIS DUE TO LIPID RICH PLAQUE: ICD-10-CM

## 2023-12-05 DIAGNOSIS — M54.12 CERVICAL RADICULOPATHY: ICD-10-CM

## 2023-12-05 DIAGNOSIS — M25.50 ARTHRALGIA OF MULTIPLE JOINTS: Primary | ICD-10-CM

## 2023-12-05 DIAGNOSIS — Z79.4 TYPE 2 DIABETES MELLITUS WITH OTHER SPECIFIED COMPLICATION, WITH LONG-TERM CURRENT USE OF INSULIN (MULTI): ICD-10-CM

## 2023-12-05 PROCEDURE — 3048F LDL-C <100 MG/DL: CPT | Performed by: FAMILY MEDICINE

## 2023-12-05 PROCEDURE — 1126F AMNT PAIN NOTED NONE PRSNT: CPT | Performed by: FAMILY MEDICINE

## 2023-12-05 PROCEDURE — 1159F MED LIST DOCD IN RCRD: CPT | Performed by: FAMILY MEDICINE

## 2023-12-05 PROCEDURE — 3077F SYST BP >= 140 MM HG: CPT | Performed by: FAMILY MEDICINE

## 2023-12-05 PROCEDURE — 3044F HG A1C LEVEL LT 7.0%: CPT | Performed by: FAMILY MEDICINE

## 2023-12-05 PROCEDURE — 3078F DIAST BP <80 MM HG: CPT | Performed by: FAMILY MEDICINE

## 2023-12-05 PROCEDURE — 4010F ACE/ARB THERAPY RXD/TAKEN: CPT | Performed by: FAMILY MEDICINE

## 2023-12-05 PROCEDURE — 99214 OFFICE O/P EST MOD 30 MIN: CPT | Performed by: FAMILY MEDICINE

## 2023-12-05 PROCEDURE — 3062F POS MACROALBUMINURIA REV: CPT | Performed by: FAMILY MEDICINE

## 2023-12-05 PROCEDURE — 1036F TOBACCO NON-USER: CPT | Performed by: FAMILY MEDICINE

## 2023-12-05 RX ORDER — METFORMIN HYDROCHLORIDE 750 MG/1
750 TABLET, EXTENDED RELEASE ORAL
Qty: 90 TABLET | Refills: 1 | Status: SHIPPED | OUTPATIENT
Start: 2023-12-05 | End: 2023-12-05 | Stop reason: SDUPTHER

## 2023-12-05 RX ORDER — CYCLOBENZAPRINE HCL 10 MG
10 TABLET ORAL NIGHTLY PRN
Qty: 10 TABLET | Refills: 0 | Status: SHIPPED | OUTPATIENT
Start: 2023-12-05 | End: 2024-02-03

## 2023-12-05 RX ORDER — SPIRONOLACTONE 25 MG/1
25 TABLET ORAL DAILY
Qty: 90 TABLET | Refills: 1 | Status: SHIPPED | OUTPATIENT
Start: 2023-12-05

## 2023-12-05 RX ORDER — METFORMIN HYDROCHLORIDE 750 MG/1
750 TABLET, EXTENDED RELEASE ORAL
Qty: 90 TABLET | Refills: 1 | Status: SHIPPED | OUTPATIENT
Start: 2023-12-05 | End: 2024-04-15 | Stop reason: SDUPTHER

## 2023-12-05 RX ORDER — ATORVASTATIN CALCIUM 80 MG/1
80 TABLET, FILM COATED ORAL DAILY
Qty: 90 TABLET | Refills: 1 | Status: SHIPPED | OUTPATIENT
Start: 2023-12-05

## 2023-12-05 RX ORDER — AMLODIPINE BESYLATE 5 MG/1
5 TABLET ORAL DAILY
Qty: 90 TABLET | Refills: 1 | Status: SHIPPED | OUTPATIENT
Start: 2023-12-05

## 2023-12-05 RX ORDER — GABAPENTIN 100 MG/1
100 CAPSULE ORAL 2 TIMES DAILY
Qty: 360 CAPSULE | Refills: 1 | Status: SHIPPED | OUTPATIENT
Start: 2023-12-05 | End: 2024-01-22 | Stop reason: WASHOUT

## 2023-12-05 RX ORDER — PANTOPRAZOLE SODIUM 40 MG/1
40 TABLET, DELAYED RELEASE ORAL
Qty: 90 TABLET | Refills: 1 | Status: SHIPPED | OUTPATIENT
Start: 2023-12-05

## 2023-12-05 NOTE — ASSESSMENT & PLAN NOTE
-Bilateral hand X-rays ordered.  -Rheumatologist referral done.  -Arthritis panel showed sign of non specific elevated SHOAIB .

## 2023-12-05 NOTE — PROGRESS NOTES
Subjective   Patient ID: Britta Fan is a 77 y.o. male who presents for Follow-up.  HPI    BRITTA FAN is a 77 year old Male, with a PMH of HTN, BPH, CAD s/p stenting, afib on xarelto, s/p AVR, right cervical radiculopathy,  lumbar radiculopathy, provoked multiple pulmonary embolism s/p TKA 2010, GERD, ED, adrenal adenoma, here for:  1-  management of his right cervical radiculopathy.  He never started Gabapentin as prescribed.  He states that he did not want any more pain medications.   2- bilateral hand edema and pain.  Sed rate was elevated.      A review of system was completed.  All systems were reviewed and were normal, except for the ones that are listed in the HPI.    Objective   Physical Exam  Constitutional:       Appearance: Normal appearance.   HENT:      Head: Normocephalic and atraumatic.      Right Ear: Tympanic membrane, ear canal and external ear normal.      Left Ear: Tympanic membrane, ear canal and external ear normal.      Nose: Nose normal.      Mouth/Throat:      Mouth: Mucous membranes are moist.      Pharynx: Oropharynx is clear.   Eyes:      Extraocular Movements: Extraocular movements intact.      Conjunctiva/sclera: Conjunctivae normal.      Pupils: Pupils are equal, round, and reactive to light.   Cardiovascular:      Rate and Rhythm: Normal rate and regular rhythm.      Pulses: Normal pulses.   Pulmonary:      Effort: Pulmonary effort is normal.      Breath sounds: Normal breath sounds.   Abdominal:      General: Abdomen is flat. Bowel sounds are normal.      Palpations: Abdomen is soft.   Musculoskeletal:         General: Normal range of motion.      Cervical back: Normal range of motion and neck supple.   Skin:     General: Skin is warm.   Neurological:      General: No focal deficit present.      Mental Status: He is alert and oriented to person, place, and time. Mental status is at baseline.   Psychiatric:         Mood and Affect: Mood normal.         Behavior: Behavior  normal.         Thought Content: Thought content normal.         Judgment: Judgment normal.     Assessment/Plan   Problem List Items Addressed This Visit       Atherosclerosis of coronary artery    Relevant Medications    amLODIPine (Norvasc) 5 mg tablet    atorvastatin (Lipitor) 80 mg tablet    Diabetes mellitus (CMS/HCC)     -borderline controlled.  -Metformin ER increased to 750 mg every day today.   -blood test in 6 months.         Relevant Medications    metFORMIN XR (Glucophage-XR) 750 mg 24 hr tablet    GERD without esophagitis    Relevant Medications    pantoprazole (ProtoNix) 40 mg EC tablet    Hypertension    Relevant Medications    amLODIPine (Norvasc) 5 mg tablet    spironolactone (Aldactone) 25 mg tablet    Cervical radiculopathy    Relevant Medications    gabapentin (Neurontin) 100 mg capsule    Multiple pulmonary emboli (CMS/HCC)    Relevant Medications    rivaroxaban (Xarelto) 20 mg tablet    Neck pain     -Tylenol PRN for pain. Patient is on Xarelto.  -Cyclobenzaprine 10 mg every day PRN started.           Relevant Medications    cyclobenzaprine (Flexeril) 10 mg tablet    Prostate cancer screening    Arthralgia of multiple joints - Primary     -Bilateral hand X-rays ordered.  -Rheumatologist referral done.  -Arthritis panel showed sign of non specific elevated SHOAIB .         Relevant Orders    XR hand left 1-2 views    XR hand right 1-2 views    Referral to Rheumatology    Patient to return to office in 4- 6 weeks for reassessment.

## 2023-12-05 NOTE — ASSESSMENT & PLAN NOTE
-borderline controlled.  -Metformin ER increased to 750 mg every day today.   -blood test in 6 months.

## 2023-12-07 ENCOUNTER — ANCILLARY PROCEDURE (OUTPATIENT)
Dept: RADIOLOGY | Facility: CLINIC | Age: 78
End: 2023-12-07
Payer: MEDICARE

## 2023-12-07 DIAGNOSIS — M25.50 ARTHRALGIA OF MULTIPLE JOINTS: ICD-10-CM

## 2023-12-07 PROCEDURE — 73130 X-RAY EXAM OF HAND: CPT | Mod: RIGHT SIDE | Performed by: RADIOLOGY

## 2023-12-07 PROCEDURE — 73130 X-RAY EXAM OF HAND: CPT | Mod: RT,FY

## 2023-12-07 PROCEDURE — 73130 X-RAY EXAM OF HAND: CPT | Mod: LT

## 2024-01-14 DIAGNOSIS — I10 PRIMARY HYPERTENSION: ICD-10-CM

## 2024-01-17 DIAGNOSIS — I10 PRIMARY HYPERTENSION: ICD-10-CM

## 2024-01-17 RX ORDER — POTASSIUM CHLORIDE 20 MEQ/1
20 TABLET, EXTENDED RELEASE ORAL DAILY
Qty: 90 TABLET | Refills: 0 | Status: SHIPPED | OUTPATIENT
Start: 2024-01-17 | End: 2024-04-05

## 2024-01-18 ENCOUNTER — OFFICE VISIT (OUTPATIENT)
Dept: RHEUMATOLOGY | Facility: CLINIC | Age: 79
End: 2024-01-18
Payer: MEDICARE

## 2024-01-18 VITALS
HEIGHT: 60 IN | BODY MASS INDEX: 47.71 KG/M2 | HEART RATE: 70 BPM | DIASTOLIC BLOOD PRESSURE: 73 MMHG | WEIGHT: 243 LBS | SYSTOLIC BLOOD PRESSURE: 116 MMHG

## 2024-01-18 DIAGNOSIS — M72.0 DUPUYTREN'S DISEASE OF BOTH PALM AND FINGER WITH CONTRACTURE: Primary | ICD-10-CM

## 2024-01-18 DIAGNOSIS — M12.9 ARTHROPATHY: ICD-10-CM

## 2024-01-18 DIAGNOSIS — R76.8 ANA POSITIVE: ICD-10-CM

## 2024-01-18 PROCEDURE — 1126F AMNT PAIN NOTED NONE PRSNT: CPT | Performed by: INTERNAL MEDICINE

## 2024-01-18 PROCEDURE — 1159F MED LIST DOCD IN RCRD: CPT | Performed by: INTERNAL MEDICINE

## 2024-01-18 PROCEDURE — 3074F SYST BP LT 130 MM HG: CPT | Performed by: INTERNAL MEDICINE

## 2024-01-18 PROCEDURE — 3078F DIAST BP <80 MM HG: CPT | Performed by: INTERNAL MEDICINE

## 2024-01-18 PROCEDURE — 99214 OFFICE O/P EST MOD 30 MIN: CPT | Performed by: INTERNAL MEDICINE

## 2024-01-18 PROCEDURE — 1036F TOBACCO NON-USER: CPT | Performed by: INTERNAL MEDICINE

## 2024-01-18 RX ORDER — CHLORTHALIDONE 25 MG/1
25 TABLET ORAL DAILY
Qty: 90 TABLET | Refills: 1 | Status: SHIPPED | OUTPATIENT
Start: 2024-01-18

## 2024-01-18 NOTE — PROGRESS NOTES
Initial Rheumatology Patient Visit    Chief Complaint:  Cristy Forte is a 78 y.o. male presenting today for New Patient Visit (ARTHRALGIA).    History of Presenting Problem: 77 y/o male with with various joint concerns present for evaluation.  He report he has had issues with swelling in the ankles and feet x few years . He report pain and swelling/stiffness in the MCP and PIP joints bilaterally. He report issues with making a fist due to finger joint stiffness   He is currently does not not take medication for joint pain as he is on Xalrelto for A-fib.  Problem List:   Patient Active Problem List   Diagnosis    Abdominal pain, periumbilical    Adrenal adenoma    Afib (CMS/HCC)    Astigmatism of both eyes with presbyopia    Atherosclerosis of coronary artery    Bilateral hand numbness    BPH (benign prostatic hyperplasia)    Carpal tunnel syndrome on both sides    Cerebrovascular accident (CVA) (CMS/HCC)    Colon polyps    Diabetes mellitus (CMS/HCC)    Diplopia    DVT (deep venous thrombosis) (CMS/HCC)    Erectile dysfunction    Fatty liver    GERD without esophagitis    Hyperlipidemia    Hypermetropia of both eyes    Hypertension    Hypokalemia    Low back pain    Cervical radiculopathy    Lumbar radiculopathy    Lumbar stenosis    Yandy-Wise tear    Melena    Multiple pulmonary emboli (CMS/HCC)    Neck pain    Nonrheumatic aortic valve stenosis    Obesity (BMI 30-39.9)    S/P AVR    Umbilical hernia    Upper GI bleed    Urgency of urination    Vertigo    Vitamin D deficiency    Weight loss    Encounter for screening for malignant neoplasm of colon    Prostate cancer screening    Arthralgia of multiple joints    Medicare annual wellness visit, subsequent    Immunization due       Past Medical History:   Past Medical History:   Diagnosis Date    Cerebral infarction, unspecified (CMS/HCC) 07/22/2020    Brainstem stroke    Encounter for screening for malignant neoplasm of rectum     Encounter for screening  for malignant neoplasm of rectum    Impacted cerumen, bilateral 12/05/2016    Impacted cerumen of both ears    Inguinal hernia 02/07/2023    Nocturia     Nocturia    Other conditions influencing health status     Screening for malignant neoplasms, colon    Other spondylosis with myelopathy, cervical region 02/07/2023    Personal history of other diseases of male genital organs 02/15/2019    History of chronic prostatitis    Personal history of other diseases of the circulatory system 04/08/2015    History of angina pectoris    Personal history of other diseases of the respiratory system 10/14/2015    History of influenza    Personal history of other drug therapy 12/11/2014    History of pneumococcal vaccination    Personal history of other specified conditions 02/06/2019    History of odynophagia    Personal history of other specified conditions     History of edema    Radiculopathy, cervical region     Cervical radiculopathy    Stenosis, cervical spine 02/07/2023    Unspecified hemorrhoids     Bleeding hemorrhoids       Surgical History:   Past Surgical History:   Procedure Laterality Date    CERVICAL FUSION  10/03/2013    Cervical Vertebral Fusion    KNEE SURGERY  12/11/2014    Knee Surgery    MR HEAD ANGIO WO IV CONTRAST  6/21/2020    MR HEAD ANGIO WO IV CONTRAST 6/21/2020 [facility]    MR HEAD ANGIO WO IV CONTRAST  10/30/2022    MR HEAD ANGIO WO IV CONTRAST 10/30/2022 [facility]    MR NECK ANGIO WO IV CONTRAST  6/21/2020    MR NECK ANGIO WO IV CONTRAST 6/21/2020 [facility]    MR NECK ANGIO WO IV CONTRAST  10/30/2022    MR NECK ANGIO WO IV CONTRAST 10/30/2022 [facility]    OTHER SURGICAL HISTORY  01/09/2019    Tonsillectomy    OTHER SURGICAL HISTORY  01/09/2019    Adenoidectomy    OTHER SURGICAL HISTORY  02/18/2020    Aortic valve replacement    PILONIDAL CYST DRAINAGE  12/11/2014    Pilonidal Cyst Resection        Allergies:   Allergies   Allergen Reactions    Iodinated  Contrast Media Hives    Latex Unknown       Medications:   Current Outpatient Medications:     amLODIPine (Norvasc) 5 mg tablet, Take 1 tablet (5 mg) by mouth once daily., Disp: 90 tablet, Rfl: 1    aspirin 81 mg capsule, Take 1 tablet by mouth 1 (one) time each day., Disp: , Rfl:     atorvastatin (Lipitor) 80 mg tablet, Take 1 tablet (80 mg) by mouth once daily., Disp: 90 tablet, Rfl: 1    chlorthalidone (Hygroton) 25 mg tablet, TAKE 1 TABLET ONE TIME DAILY, Disp: 90 tablet, Rfl: 1    cyclobenzaprine (Flexeril) 10 mg tablet, Take 1 tablet (10 mg) by mouth as needed at bedtime for muscle spasms., Disp: 10 tablet, Rfl: 0    ezetimibe (Zetia) 10 mg tablet, TAKE 1 TABLET EVERY DAY, Disp: 90 tablet, Rfl: 1    finasteride (Proscar) 5 mg tablet, TAKE 1 TABLET ONE TIME DAILY, Disp: 90 tablet, Rfl: 1    finasteride (Proscar) 5 mg tablet, Take 1 tablet (5 mg) by mouth once daily., Disp: 90 tablet, Rfl: 1    gabapentin (Neurontin) 100 mg capsule, Take 1 capsule (100 mg) by mouth 2 times a day., Disp: 360 capsule, Rfl: 1    losartan (Cozaar) 100 mg tablet, Take 1 tablet (100 mg) by mouth once daily., Disp: 90 tablet, Rfl: 1    metFORMIN XR (Glucophage-XR) 750 mg 24 hr tablet, Take 1 tablet (750 mg) by mouth once daily in the evening. Take with meals. Do not crush, chew, or split., Disp: 90 tablet, Rfl: 1    pantoprazole (ProtoNix) 40 mg EC tablet, Take 1 tablet (40 mg) by mouth once daily in the morning. Take before meals. Do not crush, chew, or split., Disp: 90 tablet, Rfl: 1    potassium chloride CR 20 mEq ER tablet, TAKE 1 TABLET ONE TIME DAILY, Disp: 90 tablet, Rfl: 0    rivaroxaban (Xarelto) 20 mg tablet, Take 1 tablet (20 mg) by mouth once daily in the evening. Take with meals., Disp: 90 tablet, Rfl: 1    spironolactone (Aldactone) 25 mg tablet, Take 1 tablet (25 mg) by mouth once daily., Disp: 90 tablet, Rfl: 1    Review of Systems:   ROS: Denies significant Morning stiffness, Denies dry eyes and mouth, but eye water a  lot, dry mouth only at night..., Denies  oral, nasal or genital ulcers, Denies sun sensitivity, Denies  Raynaud's phenomenon. Denies Uveitis or recent vision changes. Denies new rashes or Hx of Psoriasis, Denies alopecia,   Denies Chest pain/SOB, cough,Denies  Hx of asthma, Denies Fever/chills/sweats, He report  Fatigue due to exercise intolerance which is also chronic , Denies  weight loss  Denies abdominal pain, New onset Dyspepsia, dysphasia, nausea/vomiting/diarrhea, dark/tarry stools, blood in stools or urine. He report chronic  Chronic back pain.   Hx of blood clot  in 20210 after knee replacement , had a DVT/PE.  Hx of easy bruising or bleeding. Denies Headaches, numbness and tingling, weakness.  All other systems have been reviewed and are negative for complaint.   Denies family Hx of RA or Gout..    Objective   Physical Examination:    Visit Vitals  /73   Pulse 70   Ht 1.524 m (5')   Wt 110 kg (243 lb)   BMI 47.46 kg/m²   Smoking Status Former   BSA 2.16 m²        Gen: NAD, A&O x 3  HEENT: clear sclera and conjunctiva,     Musculoskeletal:   Neck; WNL, full ROM  Shoulder: WNL, full ROM  Elbow:WNL, full ROM, no effusion noted  Wrist and fingers;no active synovitis noted, patient noted to have bilateral Dupuytren's contracture with flexion contracture of several fingers full ROM in the Wrist   Knees:  No effusions or crepitation, full ROM.  Hips; WNL, full ROM, Negative Nicola test  Ankle, Feet; WNL, full ROM    Skin: No rashes or lesions seen, no nail changes  Neuro: A&O x3, Normal Gait    Procedures :None    Orders:  Orders Placed This Encounter   Procedures    XR hand 3+ views bilateral    Uric Acid    Citrulline Antibody, IgG    C-Reactive Protein    Sedimentation Rate    C4 Complement    C3 Complement    Protein, Urine Random    Creatinine, Urine Random    CBC        Provider Impression:   Assessment/Plan   Encounter Diagnoses   Name Primary?    Arthropathy     Dupuytren's disease of both palm and  finger with contracture Yes    SHOAIB positive       77 y/o male with with various joint concerns present for evaluation.  He is concerned with issues with swelling in his lower extremities with occasional pain in the ankle, he also reports stiffness and swelling in his hands on exam he has no active synovitis but does have Dupuytren's contracture bilaterally, he is unable to make a fist.  Rule out underlying inflammatory arthritis.  He is noted to have a positive SHOAIB  -Obtain additional serologies  -Obtain screening x-rays  -Okay to take Tylenol 1000 mg 2-3 times a day as needed  -Follow-up in 2 to 3 weeks

## 2024-01-22 ENCOUNTER — LAB (OUTPATIENT)
Dept: LAB | Facility: LAB | Age: 79
End: 2024-01-22
Payer: MEDICARE

## 2024-01-22 ENCOUNTER — OFFICE VISIT (OUTPATIENT)
Dept: PRIMARY CARE | Facility: CLINIC | Age: 79
End: 2024-01-22
Payer: MEDICARE

## 2024-01-22 ENCOUNTER — HOSPITAL ENCOUNTER (OUTPATIENT)
Dept: RADIOLOGY | Facility: CLINIC | Age: 79
Discharge: HOME | End: 2024-01-22
Payer: MEDICARE

## 2024-01-22 VITALS
HEART RATE: 91 BPM | BODY MASS INDEX: 47.07 KG/M2 | DIASTOLIC BLOOD PRESSURE: 81 MMHG | TEMPERATURE: 98 F | WEIGHT: 241 LBS | SYSTOLIC BLOOD PRESSURE: 152 MMHG

## 2024-01-22 DIAGNOSIS — I10 PRIMARY HYPERTENSION: ICD-10-CM

## 2024-01-22 DIAGNOSIS — E11.21 CONTROLLED TYPE 2 DIABETES MELLITUS WITH DIABETIC NEPHROPATHY, WITHOUT LONG-TERM CURRENT USE OF INSULIN (MULTI): ICD-10-CM

## 2024-01-22 DIAGNOSIS — R76.8 ANA POSITIVE: ICD-10-CM

## 2024-01-22 DIAGNOSIS — M54.12 CERVICAL RADICULOPATHY: ICD-10-CM

## 2024-01-22 DIAGNOSIS — M12.9 ARTHROPATHY: ICD-10-CM

## 2024-01-22 DIAGNOSIS — M54.12 CERVICAL RADICULOPATHY: Primary | ICD-10-CM

## 2024-01-22 DIAGNOSIS — E78.00 PURE HYPERCHOLESTEROLEMIA: ICD-10-CM

## 2024-01-22 LAB
C3 SERPL-MCNC: 146 MG/DL (ref 87–200)
C4 SERPL-MCNC: 39 MG/DL (ref 10–50)
CCP IGG SERPL-ACNC: <1 U/ML
CREAT UR-MCNC: 83 MG/DL (ref 20–370)
CRP SERPL-MCNC: 0.12 MG/DL
ERYTHROCYTE [DISTWIDTH] IN BLOOD BY AUTOMATED COUNT: 16 % (ref 11.5–14.5)
ERYTHROCYTE [SEDIMENTATION RATE] IN BLOOD BY WESTERGREN METHOD: 17 MM/H (ref 0–20)
HCT VFR BLD AUTO: 44.9 % (ref 41–52)
HGB BLD-MCNC: 13.5 G/DL (ref 13.5–17.5)
MCH RBC QN AUTO: 24.7 PG (ref 26–34)
MCHC RBC AUTO-ENTMCNC: 30.1 G/DL (ref 32–36)
MCV RBC AUTO: 82 FL (ref 80–100)
NRBC BLD-RTO: 0 /100 WBCS (ref 0–0)
PLATELET # BLD AUTO: 136 X10*3/UL (ref 150–450)
PROT UR-ACNC: 4 MG/DL (ref 5–25)
PROT/CREAT UR: 0.05 MG/MG CREAT (ref 0–0.17)
RBC # BLD AUTO: 5.46 X10*6/UL (ref 4.5–5.9)
URATE SERPL-MCNC: 4.8 MG/DL (ref 4–7.5)
WBC # BLD AUTO: 5.3 X10*3/UL (ref 4.4–11.3)

## 2024-01-22 PROCEDURE — 72050 X-RAY EXAM NECK SPINE 4/5VWS: CPT | Performed by: RADIOLOGY

## 2024-01-22 PROCEDURE — 3077F SYST BP >= 140 MM HG: CPT | Performed by: FAMILY MEDICINE

## 2024-01-22 PROCEDURE — 73130 X-RAY EXAM OF HAND: CPT | Mod: BILATERAL PROCEDURE | Performed by: RADIOLOGY

## 2024-01-22 PROCEDURE — 99214 OFFICE O/P EST MOD 30 MIN: CPT | Performed by: FAMILY MEDICINE

## 2024-01-22 PROCEDURE — 1036F TOBACCO NON-USER: CPT | Performed by: FAMILY MEDICINE

## 2024-01-22 PROCEDURE — 84550 ASSAY OF BLOOD/URIC ACID: CPT

## 2024-01-22 PROCEDURE — 72050 X-RAY EXAM NECK SPINE 4/5VWS: CPT

## 2024-01-22 PROCEDURE — 1126F AMNT PAIN NOTED NONE PRSNT: CPT | Performed by: FAMILY MEDICINE

## 2024-01-22 PROCEDURE — 36415 COLL VENOUS BLD VENIPUNCTURE: CPT

## 2024-01-22 PROCEDURE — 73130 X-RAY EXAM OF HAND: CPT | Mod: 50

## 2024-01-22 PROCEDURE — 82570 ASSAY OF URINE CREATININE: CPT

## 2024-01-22 PROCEDURE — 86200 CCP ANTIBODY: CPT

## 2024-01-22 PROCEDURE — 84156 ASSAY OF PROTEIN URINE: CPT

## 2024-01-22 PROCEDURE — 85652 RBC SED RATE AUTOMATED: CPT

## 2024-01-22 PROCEDURE — 1159F MED LIST DOCD IN RCRD: CPT | Performed by: FAMILY MEDICINE

## 2024-01-22 PROCEDURE — 3079F DIAST BP 80-89 MM HG: CPT | Performed by: FAMILY MEDICINE

## 2024-01-22 PROCEDURE — 85027 COMPLETE CBC AUTOMATED: CPT

## 2024-01-22 PROCEDURE — 86160 COMPLEMENT ANTIGEN: CPT

## 2024-01-22 PROCEDURE — 86140 C-REACTIVE PROTEIN: CPT

## 2024-01-22 NOTE — PROGRESS NOTES
Subjective   Patient ID: Britta Fan is a 78 y.o. male who presents for Follow-up.  HPI    BRITTA FAN is a 78 year old Male, with a PMH of HTN, BPH, CAD s/p stenting, afib on xarelto, s/p AVR, right cervical radiculopathy,  lumbar radiculopathy s/p Bilateral C7 pedicle screw fractures, provoked multiple pulmonary embolism s/p TKA 2010, GERD, ED, adrenal adenoma, here for:    1-  management of his right cervical radiculopathy s/p Bilateral C7 pedicle screw fractures.  It is causing bilateral hand numbness. He started Gabapentin as prescribed but had some drowsiness side effects.  He states that he did not want any more pain medications.     2- bilateral hand edema and pain.  Sed rate was elevated.  He already saw the rheumatologist.     3-  HTN is -controlled but he did not take his medication today.      A review of system was completed.  All systems were reviewed and were normal, except for the ones that are listed in the HPI.    Objective   Physical Exam  Constitutional:       Appearance: Normal appearance.   HENT:      Head: Normocephalic and atraumatic.      Right Ear: Tympanic membrane, ear canal and external ear normal.      Left Ear: Tympanic membrane, ear canal and external ear normal.      Nose: Nose normal.      Mouth/Throat:      Mouth: Mucous membranes are moist.      Pharynx: Oropharynx is clear.   Eyes:      Extraocular Movements: Extraocular movements intact.      Conjunctiva/sclera: Conjunctivae normal.      Pupils: Pupils are equal, round, and reactive to light.   Cardiovascular:      Rate and Rhythm: Normal rate and regular rhythm.      Pulses: Normal pulses.   Pulmonary:      Effort: Pulmonary effort is normal.      Breath sounds: Normal breath sounds.   Abdominal:      General: Abdomen is flat. Bowel sounds are normal.      Palpations: Abdomen is soft.   Musculoskeletal:         General: Normal range of motion.      Cervical back: Normal range of motion and neck supple.   Skin:      General: Skin is warm.   Neurological:      General: No focal deficit present.      Mental Status: He is alert and oriented to person, place, and time. Mental status is at baseline.   Psychiatric:         Mood and Affect: Mood normal.         Behavior: Behavior normal.         Thought Content: Thought content normal.         Judgment: Judgment normal.     Assessment/Plan   Problem List Items Addressed This Visit       Hyperlipidemia    Hypertension     -Controlled when he takes his medications as prescribed.          Cervical radiculopathy - Primary     -C-spine X-ray ordered:  right cervical radiculopathy s/p Bilateral C7 pedicle screw fractures.  R/o worsening.    -Stopped Gabapentin today for side effects.  -Start back Topiramate 25 mg at bedtime for paresthesia and weight loss benefit per patient's request.   -PT and OT referral done today.          Relevant Orders    XR cervical spine 2-3 views    Referral to Physical Therapy    Referral to Occupational Therapy    Controlled type 2 diabetes mellitus with diabetic nephropathy, without long-term current use of insulin (CMS/[facility])     -Continue Metformin 750 mg every day today.         Patient to return to office in 4- 6 weeks for reassessment.

## 2024-01-22 NOTE — ASSESSMENT & PLAN NOTE
-C-spine X-ray ordered:  right cervical radiculopathy s/p Bilateral C7 pedicle screw fractures.  R/o worsening.    -Stopped Gabapentin today for side effects.  -Start back Topiramate 25 mg at bedtime for paresthesia and weight loss benefit per patient's request.   -PT and OT referral done today.

## 2024-01-24 ENCOUNTER — APPOINTMENT (OUTPATIENT)
Dept: OPHTHALMOLOGY | Facility: CLINIC | Age: 79
End: 2024-01-24
Payer: MEDICARE

## 2024-01-24 ENCOUNTER — OFFICE VISIT (OUTPATIENT)
Dept: OPHTHALMOLOGY | Facility: CLINIC | Age: 79
End: 2024-01-24
Payer: MEDICARE

## 2024-01-24 DIAGNOSIS — H25.13 NUCLEAR SENILE CATARACT OF BOTH EYES: ICD-10-CM

## 2024-01-24 DIAGNOSIS — E11.9 TYPE 2 DIABETES MELLITUS WITHOUT COMPLICATION, WITHOUT LONG-TERM CURRENT USE OF INSULIN (MULTI): Primary | ICD-10-CM

## 2024-01-24 DIAGNOSIS — Z86.69 HX OF DIPLOPIA: ICD-10-CM

## 2024-01-24 PROCEDURE — 92014 COMPRE OPH EXAM EST PT 1/>: CPT | Performed by: OPHTHALMOLOGY

## 2024-01-24 ASSESSMENT — TONOMETRY
IOP_METHOD: GOLDMANN APPLANATION
OS_IOP_MMHG: 18
OD_IOP_MMHG: 17

## 2024-01-24 ASSESSMENT — VISUAL ACUITY
METHOD: SNELLEN - LINEAR
OS_CC: 20/20-2
OD_CC: 20/20-1

## 2024-01-24 ASSESSMENT — CUP TO DISC RATIO
OS_RATIO: 0.2
OD_RATIO: 0.2

## 2024-01-24 ASSESSMENT — SLIT LAMP EXAM - LIDS
COMMENTS: GOOD POSITION
COMMENTS: GOOD POSITION

## 2024-01-24 ASSESSMENT — EXTERNAL EXAM - RIGHT EYE: OD_EXAM: NORMAL

## 2024-01-24 ASSESSMENT — EXTERNAL EXAM - LEFT EYE: OS_EXAM: NORMAL

## 2024-01-24 NOTE — PROGRESS NOTES
Assessment/Plan   Diagnoses and all orders for this visit:  Type 2 diabetes mellitus without complication, without long-term current use of insulin (CMS/[facility])  -no evidence of diabetic retinopathy at the present time  -pt was advised of the importance of good diabetes control and the importance of a yearly dilated diabetic exam    Nuclear senile cataract of both eyes  Not visually significant at the present time  continue to monitor    Hx of diplopia  -resolved  continue to monitor    Return for a dilated exam in  12  months or sooner if having any problems

## 2024-01-29 ENCOUNTER — TELEPHONE (OUTPATIENT)
Dept: PRIMARY CARE | Facility: CLINIC | Age: 79
End: 2024-01-29

## 2024-02-05 ENCOUNTER — EVALUATION (OUTPATIENT)
Dept: OCCUPATIONAL THERAPY | Facility: CLINIC | Age: 79
End: 2024-02-05
Payer: MEDICARE

## 2024-02-05 DIAGNOSIS — M54.12 CERVICAL RADICULOPATHY: ICD-10-CM

## 2024-02-05 DIAGNOSIS — R27.8 DECREASED COORDINATION: Primary | ICD-10-CM

## 2024-02-05 DIAGNOSIS — R29.898 UPPER EXTREMITY WEAKNESS: ICD-10-CM

## 2024-02-05 PROCEDURE — 97165 OT EVAL LOW COMPLEX 30 MIN: CPT | Mod: GO

## 2024-02-05 PROCEDURE — 97530 THERAPEUTIC ACTIVITIES: CPT | Mod: GO

## 2024-02-05 ASSESSMENT — ENCOUNTER SYMPTOMS
LOSS OF SENSATION IN FEET: 1
OCCASIONAL FEELINGS OF UNSTEADINESS: 1
DEPRESSION: 0

## 2024-02-05 ASSESSMENT — PAIN SCALES - GENERAL: PAINLEVEL_OUTOF10: 2

## 2024-02-05 ASSESSMENT — PAIN - FUNCTIONAL ASSESSMENT: PAIN_FUNCTIONAL_ASSESSMENT: 0-10

## 2024-02-05 NOTE — PROGRESS NOTES
Occupational Therapy    Occupational Therapy Evaluation    Name: Britta Fan  MRN: [id number]  : 1945  Date: 24  Time Calculation  Start Time: 0845  Stop Time: 930  Time Calculation (min): 45 min  Visit: 1  Assessment:   Patient is a 78 year old male who came to outpatient OT with dx of cervical radiculopathy. Patient presents with stiffness/decreased ROM in B shoulders and hands, decreased BUE strength and parathesias impacting patient's coordination and abilities to carry out every day tasks. Patient would benefit from skilled OT to address the above impairments to maximize patient's functional abilities.  Plan:   OT POC to include: therapeutic exercise, therapeutic activity, manual therapy, heat, AE/modification training, HEP.  1x a week for 5-6 weeks  Patient in agreement with plan.    Subjective  Patient agreeable to OT evaluation. Patient reports surgery in . Parathesias in B hands has gotten worse over the years. Patient reports some difficulty donning B socks. Also has difficulty with more fine motor tasks such as buttons and zippers.  Current Problem:  1. Decreased coordination  Follow Up In Occupational Therapy      2. Cervical radiculopathy  Referral to Occupational Therapy    Follow Up In Occupational Therapy      3. Upper extremity weakness  Follow Up In Occupational Therapy        General:      General  Reason for Referral: OT eval and treat  Referred By:  (Misa Muir MD)  Per MD note on 24:   BRITTA FAN is a 78 year old Male, with a PMH of HTN, BPH, CAD s/p stenting, afib on xarelto, s/p AVR, right cervical radiculopathy,  lumbar radiculopathy s/p Bilateral C7 pedicle screw fractures, provoked multiple pulmonary embolism s/p TKA , GERD, ED, adrenal adenoma, here for:   1-  management of his right cervical radiculopathy s/p Bilateral C7 pedicle screw fractures.  It is causing bilateral hand numbness. He started Gabapentin as prescribed but had some  "drowsiness side effects.  He states that he did not want any more pain medications.   2- bilateral hand edema and pain.  Sed rate was elevated.  He already saw the rheumatologist.   Precautions:  Precautions  STEADI Fall Risk Score (The score of 4 or more indicates an increased risk of falling): 5  Pain Assessment:  Pain Assessment  Pain Assessment: 0-10  Pain Score: 2  Pain Type: Chronic pain  Pain Location: Hand  Work History:  Retired  Roles/Routines:  Has 2 daughters; grandchildren and great grandchildren  Patient Goal:  \"Get back to normal\"  Personal Factors that my impact Care:   chronic  Objective   Observation:  R handed   Walks without a device  Forward posturing of neck  Palpation:  Tight B upper traps  ROM:  BUE ROM WFL ( R shoulder flexion: ~135 degrees, L shoulder flexion: ~130 degrees)  Strength:  BUE grossly 3+ to 4-/5 with exception of .   L : 45#  R : 40#  Coordination:  Difficulty with digit opposition in the L hand 4th and 5th digits due to tightness in digits  Sensation:  Reports parathesias on volar and dorsal aspect of B hands  Outcome Measures:  OT Adult Other Outcome Measures  9 Hole Peg Test:  (R: 63 seconds  L: 45 seconds)    OP EDUCATION:/Treatment 15mins   Extensively went over adaptive equipment and modifications patient can use to increase efficiency with every day tasks I.e dycem to open containers, foam tubing to build up eating utensils and writing utensils. Patient was also educated on use of button hook.   Patient was also given joint protection examples on handout.    Goals:  Active       OT Goals       HEP       Start:  02/05/24    Expected End:  04/05/24       Patient will be independent with established HEP including UE stretching, ROM, and strengthening exercises         Strength       Start:  02/05/24    Expected End:  04/05/24       Patient will increase B  strength by 5# or greater for ADL/IADL tasks         ADL       Start:  02/05/24    Expected End:  " 04/05/24       Patient will understand adaptive equipment and modification training to increase efficiency and independence with ADL/IADL tasks.

## 2024-02-14 ENCOUNTER — TREATMENT (OUTPATIENT)
Dept: OCCUPATIONAL THERAPY | Facility: CLINIC | Age: 79
End: 2024-02-14
Payer: MEDICARE

## 2024-02-14 DIAGNOSIS — R27.8 DECREASED COORDINATION: Primary | ICD-10-CM

## 2024-02-14 DIAGNOSIS — M54.12 CERVICAL RADICULOPATHY: ICD-10-CM

## 2024-02-14 DIAGNOSIS — R29.898 UPPER EXTREMITY WEAKNESS: ICD-10-CM

## 2024-02-14 PROCEDURE — 97110 THERAPEUTIC EXERCISES: CPT | Mod: GO

## 2024-02-14 ASSESSMENT — PAIN SCALES - GENERAL: PAINLEVEL_OUTOF10: 0 - NO PAIN

## 2024-02-14 ASSESSMENT — PAIN - FUNCTIONAL ASSESSMENT: PAIN_FUNCTIONAL_ASSESSMENT: 0-10

## 2024-02-14 NOTE — PROGRESS NOTES
Occupational Therapy    Occupational Therapy Treatment    Name: Cristy Forte  MRN: [id number]  : 1945  Date: 24  Time Calculation  Start Time: 930  Stop Time: 1015  Time Calculation (min): 45 min  Visit: 2  Assessment:   Patient responded well to treatment session, reporting a good stretch with cane exercises.  Plan:   Continue with OT POC     Subjective   Patient agreeable to treatment session. No new issues to report.    Pain Assessment:  Pain Assessment  Pain Assessment: 0-10  Pain Score: 0 - No pain    Objective    Therapeutic Exercise: 40mins  Completed cane exercises in supine to promote B shoulder ROM for ADLs:  Shoulder flexion, shoulder protraction, external rotation. Cues given for proper technique.    Quick Dash: 25.00%    Completed yellow/red mixture of putty exercises to B hands: digit flexion, digit extension, digit adduction, digit tip to tip pinch, lateral pinch, digit abduction    OP EDUCATION:   Cane exercises with handouts with pictures  Putty exercises with handouts    Goals:  Active       OT Goals       HEP       Start:  24    Expected End:  24       Patient will be independent with established HEP including UE stretching, ROM, and strengthening exercises         Strength       Start:  24    Expected End:  24       Patient will increase B  strength by 5# or greater for ADL/IADL tasks         ADL       Start:  24    Expected End:  24       Patient will understand adaptive equipment and modification training to increase efficiency and independence with ADL/IADL tasks.

## 2024-02-15 ENCOUNTER — OFFICE VISIT (OUTPATIENT)
Dept: RHEUMATOLOGY | Facility: CLINIC | Age: 79
End: 2024-02-15
Payer: MEDICARE

## 2024-02-15 VITALS
BODY MASS INDEX: 47.16 KG/M2 | DIASTOLIC BLOOD PRESSURE: 72 MMHG | HEIGHT: 60 IN | SYSTOLIC BLOOD PRESSURE: 119 MMHG | WEIGHT: 240.2 LBS | HEART RATE: 53 BPM

## 2024-02-15 DIAGNOSIS — M72.0 DUPUYTREN'S DISEASE OF BOTH PALM AND FINGER WITH CONTRACTURE: Primary | ICD-10-CM

## 2024-02-15 DIAGNOSIS — R76.8 ANA POSITIVE: ICD-10-CM

## 2024-02-15 PROCEDURE — 1036F TOBACCO NON-USER: CPT | Performed by: INTERNAL MEDICINE

## 2024-02-15 PROCEDURE — 3074F SYST BP LT 130 MM HG: CPT | Performed by: INTERNAL MEDICINE

## 2024-02-15 PROCEDURE — 1159F MED LIST DOCD IN RCRD: CPT | Performed by: INTERNAL MEDICINE

## 2024-02-15 PROCEDURE — 99214 OFFICE O/P EST MOD 30 MIN: CPT | Performed by: INTERNAL MEDICINE

## 2024-02-15 PROCEDURE — 3078F DIAST BP <80 MM HG: CPT | Performed by: INTERNAL MEDICINE

## 2024-02-15 PROCEDURE — 1126F AMNT PAIN NOTED NONE PRSNT: CPT | Performed by: INTERNAL MEDICINE

## 2024-02-15 NOTE — PROGRESS NOTES
Follow-up Rheumatology Patient Visit    Chief Complaint:  Cristy Forte is a 78 y.o. male presenting today for Follow-up.    History of Presenting Problem:   79 y/o male with with various joint concerns present for evaluation.  He report he has had issues with swelling in the ankles and feet x few years .  He report issues with making a fist due to finger joint stiffness   He is currently does not not take medication for joint pain as he is on Xalrelto for A-fib.  Today patient follows up to review his results.  He denies significant pain in his fingers does have inability to make a full fist    Problem List:   Patient Active Problem List   Diagnosis    Abdominal pain, periumbilical    Adrenal adenoma    Afib (CMS/HCC)    Astigmatism of both eyes with presbyopia    Atherosclerosis of coronary artery    Bilateral hand numbness    BPH (benign prostatic hyperplasia)    Carpal tunnel syndrome on both sides    Cerebrovascular accident (CVA) (CMS/HCC)    Colon polyps    Diabetes mellitus (CMS/HCC)    Diplopia    DVT (deep venous thrombosis) (CMS/HCC)    Erectile dysfunction    Fatty liver    GERD without esophagitis    Hyperlipidemia    Hypermetropia of both eyes    Hypertension    Hypokalemia    Low back pain    Cervical radiculopathy    Lumbar radiculopathy    Lumbar stenosis    Yandy-Wise tear    Melena    Multiple pulmonary emboli (CMS/HCC)    Neck pain    Nonrheumatic aortic valve stenosis    Obesity (BMI 30-39.9)    S/P AVR    Umbilical hernia    Upper GI bleed    Urgency of urination    Vertigo    Vitamin D deficiency    Weight loss    Encounter for screening for malignant neoplasm of colon    Prostate cancer screening    Arthralgia of multiple joints    Medicare annual wellness visit, subsequent    Immunization due    Controlled type 2 diabetes mellitus with diabetic nephropathy, without long-term current use of insulin (CMS/HCC)    Decreased coordination    Upper extremity weakness       Past Medical  History:   Past Medical History:   Diagnosis Date    Cerebral infarction, unspecified (CMS/HCC) 07/22/2020    Brainstem stroke    Encounter for screening for malignant neoplasm of rectum     Encounter for screening for malignant neoplasm of rectum    Impacted cerumen, bilateral 12/05/2016    Impacted cerumen of both ears    Inguinal hernia 02/07/2023    Nocturia     Nocturia    Other conditions influencing health status     Screening for malignant neoplasms, colon    Other spondylosis with myelopathy, cervical region 02/07/2023    Personal history of other diseases of male genital organs 02/15/2019    History of chronic prostatitis    Personal history of other diseases of the circulatory system 04/08/2015    History of angina pectoris    Personal history of other diseases of the respiratory system 10/14/2015    History of influenza    Personal history of other drug therapy 12/11/2014    History of pneumococcal vaccination    Personal history of other specified conditions 02/06/2019    History of odynophagia    Personal history of other specified conditions     History of edema    Radiculopathy, cervical region     Cervical radiculopathy    Stenosis, cervical spine 02/07/2023    Unspecified hemorrhoids     Bleeding hemorrhoids       Surgical History:   Past Surgical History:   Procedure Laterality Date    CERVICAL FUSION  10/03/2013    Cervical Vertebral Fusion    KNEE SURGERY  12/11/2014    Knee Surgery    MR HEAD ANGIO WO IV CONTRAST  6/21/2020    MR HEAD ANGIO WO IV CONTRAST 6/21/2020 [facility]    MR HEAD ANGIO WO IV CONTRAST  10/30/2022    MR HEAD ANGIO WO IV CONTRAST 10/30/2022 [facility]    MR NECK ANGIO WO IV CONTRAST  6/21/2020    MR NECK ANGIO WO IV CONTRAST 6/21/2020 [facility]    MR NECK ANGIO WO IV CONTRAST  10/30/2022    MR NECK ANGIO WO IV CONTRAST 10/30/2022 [facility]    OTHER SURGICAL HISTORY  01/09/2019    Tonsillectomy    OTHER SURGICAL HISTORY  01/09/2019     Adenoidectomy    OTHER SURGICAL HISTORY  02/18/2020    Aortic valve replacement    PILONIDAL CYST DRAINAGE  12/11/2014    Pilonidal Cyst Resection        Allergies:   Allergies   Allergen Reactions    Iodinated Contrast Media Hives    Latex Unknown       Medications:   Current Outpatient Medications:     amLODIPine (Norvasc) 5 mg tablet, Take 1 tablet (5 mg) by mouth once daily., Disp: 90 tablet, Rfl: 1    aspirin 81 mg capsule, Take 1 tablet by mouth 1 (one) time each day., Disp: , Rfl:     atorvastatin (Lipitor) 80 mg tablet, Take 1 tablet (80 mg) by mouth once daily., Disp: 90 tablet, Rfl: 1    chlorthalidone (Hygroton) 25 mg tablet, TAKE 1 TABLET ONE TIME DAILY, Disp: 90 tablet, Rfl: 1    ezetimibe (Zetia) 10 mg tablet, TAKE 1 TABLET EVERY DAY, Disp: 90 tablet, Rfl: 1    finasteride (Proscar) 5 mg tablet, Take 1 tablet (5 mg) by mouth once daily., Disp: 90 tablet, Rfl: 1    losartan (Cozaar) 100 mg tablet, Take 1 tablet (100 mg) by mouth once daily., Disp: 90 tablet, Rfl: 1    metFORMIN XR (Glucophage-XR) 750 mg 24 hr tablet, Take 1 tablet (750 mg) by mouth once daily in the evening. Take with meals. Do not crush, chew, or split., Disp: 90 tablet, Rfl: 1    pantoprazole (ProtoNix) 40 mg EC tablet, Take 1 tablet (40 mg) by mouth once daily in the morning. Take before meals. Do not crush, chew, or split., Disp: 90 tablet, Rfl: 1    potassium chloride CR 20 mEq ER tablet, TAKE 1 TABLET ONE TIME DAILY, Disp: 90 tablet, Rfl: 0    rivaroxaban (Xarelto) 20 mg tablet, Take 1 tablet (20 mg) by mouth once daily in the evening. Take with meals., Disp: 90 tablet, Rfl: 1    spironolactone (Aldactone) 25 mg tablet, Take 1 tablet (25 mg) by mouth once daily., Disp: 90 tablet, Rfl: 1    cyclobenzaprine (Flexeril) 10 mg tablet, Take 1 tablet (10 mg) by mouth as needed at bedtime for muscle spasms., Disp: 10 tablet, Rfl: 0      Objective   Physical Examination:    Visit Vitals  /72   Pulse 53   Ht 1.524 m (5')   Wt 109  kg (240 lb 3.2 oz)   BMI 46.91 kg/m²   Smoking Status Former   BSA 2.15 m²        Gen: NAD, A&O x 3  HEENT: clear sclera and conjunctiva,     Musculoskeletal:   Neck; WNL, full ROM  Shoulder: WNL, full ROM  Elbow:WNL, full ROM, no effusion noted  Wrist and fingers;no active synovitis noted, bilateral Dupuytren's contracture noted in the third digits.. full ROM in the Wrist , limited fist and   Knees:  No effusions or crepitation, full ROM.  Hips; WNL, full ROM, Negative Nicola test  Ankle, Feet; WNL, full ROM    Skin: No rashes or lesions seen, no nail changes  Neuro: A&O x3, Normal Gait    Procedures :None    Orders:  Orders Placed This Encounter   Procedures    Referral to Orthopaedic Surgery        Provider Impression:   Assessment/Plan   Encounter Diagnoses   Name Primary?    Dupuytren's disease of both palm and finger with contracture Yes    SHOAIB positive         77 y/o male with with various joint concerns present for evaluation.  He is concerned with issues with swelling in his lower extremities with occasional pain in the ankle, he also reports stiffness and swelling in his hands on exam he has no active synovitis but does have Dupuytren's contracture bilaterally, he is unable to make a fist.  Further testing due to SHOAIB positivity did not show underlying connective tissue disease at this time  -Okay to take Tylenol 1000 mg 2-3 times a day as needed  -Referral to hand orthopedic surgeon for options  -Continue occupational therapy  -Follow-up as needed

## 2024-02-21 ENCOUNTER — TREATMENT (OUTPATIENT)
Dept: OCCUPATIONAL THERAPY | Facility: CLINIC | Age: 79
End: 2024-02-21
Payer: MEDICARE

## 2024-02-21 DIAGNOSIS — M54.12 CERVICAL RADICULOPATHY: ICD-10-CM

## 2024-02-21 DIAGNOSIS — R27.8 DECREASED COORDINATION: Primary | ICD-10-CM

## 2024-02-21 DIAGNOSIS — R29.898 UPPER EXTREMITY WEAKNESS: ICD-10-CM

## 2024-02-21 PROCEDURE — 97110 THERAPEUTIC EXERCISES: CPT | Mod: GO

## 2024-02-21 PROCEDURE — 97140 MANUAL THERAPY 1/> REGIONS: CPT | Mod: GO

## 2024-02-21 ASSESSMENT — PAIN - FUNCTIONAL ASSESSMENT: PAIN_FUNCTIONAL_ASSESSMENT: 0-10

## 2024-02-21 ASSESSMENT — PAIN SCALES - GENERAL: PAINLEVEL_OUTOF10: 0 - NO PAIN

## 2024-02-21 NOTE — PROGRESS NOTES
"Occupational Therapy    Occupational Therapy Treatment    Name: Cristy Forte  MRN: [id number]  : 1945  Date: 24  Time Calculation  Start Time: 0915  Stop Time: 1000  Time Calculation (min): 45 min  Visit: 3  Assessment:   Patient responded well to treatment session, reporting fingers feeling \"better\" after passive stretch.   Plan:   Continue with skilled OT POC     Subjective   Patient agreeable to treatment session. No new issues to report.     Pain Assessment:  Pain Assessment  Pain Assessment: 0-10  Pain Score: 0 - No pain    Objective    Therapeutic Exercise: 20 mins  Completed [employer] bike to increase ROM and endurance of BUE's. Completed 5 mins forwards and 5 mins backwards on level 2.3, RPM 51. Patient took a rest break between sets of 5 minutes.    Completed B wrist extension stretches 5x 5 second holds    Manual Therapy: 25 mins  B scapular mobs completed: elevation/depression, abduction/adduction    STM to B hands volar and dorsal aspect. Gentle passive stretch to each joint of each digit of B hands separately and compositely.     OP EDUCATION:   Educated on gentle stretch to digits and B wrists, as well as modalities (heat vs ice)    Goals:  Active       OT Goals       HEP       Start:  24    Expected End:  24       Patient will be independent with established HEP including UE stretching, ROM, and strengthening exercises         Strength       Start:  24    Expected End:  24       Patient will increase B  strength by 5# or greater for ADL/IADL tasks         ADL       Start:  24    Expected End:  24       Patient will understand adaptive equipment and modification training to increase efficiency and independence with ADL/IADL tasks.                   "

## 2024-02-27 ENCOUNTER — OFFICE VISIT (OUTPATIENT)
Dept: ORTHOPEDIC SURGERY | Facility: CLINIC | Age: 79
End: 2024-02-27
Payer: MEDICARE

## 2024-02-27 DIAGNOSIS — M24.549 CONTRACTURE OF HAND: ICD-10-CM

## 2024-02-27 DIAGNOSIS — G56.03 BILATERAL CARPAL TUNNEL SYNDROME: Primary | ICD-10-CM

## 2024-02-27 PROCEDURE — 99204 OFFICE O/P NEW MOD 45 MIN: CPT | Performed by: ORTHOPAEDIC SURGERY

## 2024-02-27 PROCEDURE — 99214 OFFICE O/P EST MOD 30 MIN: CPT | Mod: GC | Performed by: ORTHOPAEDIC SURGERY

## 2024-02-27 PROCEDURE — 1159F MED LIST DOCD IN RCRD: CPT | Performed by: ORTHOPAEDIC SURGERY

## 2024-02-27 PROCEDURE — 1160F RVW MEDS BY RX/DR IN RCRD: CPT | Performed by: ORTHOPAEDIC SURGERY

## 2024-02-27 PROCEDURE — 1036F TOBACCO NON-USER: CPT | Performed by: ORTHOPAEDIC SURGERY

## 2024-02-27 NOTE — PROGRESS NOTES
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  There is no overlying skin cord or skin pitting to suggest Dupuytren's disease as the cause.  They are not classic for muscle imbalance contractures.  I am not sure of the original etiology.  We had a long discussion of about the options for management, and the role of daily stretching to try to slow down any progression.  I reviewed the option of formal open surgical contracture release, but also the significant risks of the procedure.  30 ° is the break even point on these contractures, where it is a 50-50 shot whether surgery would improve things or actually make them worse.  As such, I would not suggest any surgical intervention at this time, though it would become a better batch if the contracture should progress further.    Follow-up with any additional concerns.        Bilateral carpal tunnel syndrome    The nature of carpal tunnel syndrome was reviewed, along with the slowly progressive natural history.  The options for management were reviewed, including night splinting, cortisone injection, or surgical carpal tunnel release.  The major benefits and risks of surgery were specifically reviewed, as was the postoperative rehabilitation course.    Before trying anything invasive, the patient wanted to try a course of night splinting.  Proper splint use was reviewed.  Followup in 4 weeks if things are not improving to their satisfaction.        HISTORY OF PRESENT ILLNESS       Patient is a 78 y.o. right-hand dominant male retired, who presents today as a referral from Rheumatology for evaluation of his bilateral finger flexion contractures.  He reports contractures in his right and left hand most pronounced in his ring and middle fingers that has started to prevent him from picking up objects.  This is at the  PIP joints.  There is no popping, clicking, or subjective instability.  No recalled antecedent trauma in these digits.    He also notes bilateral hand numbness and tingling that wakes him from sleep at night.  He reports having to shake out his hands to resolve the symptoms.  He has a history of a left carpal tunnel release performed approximately 4 to 5 years ago but he does not remember who performed the surgery.  From our records, it looks like it was Dr. Cooney in 2018.  He has not tried nighttime splints or injections into his right carpal tunnel.  He is not interested in surgery at this time.    He is diabetic but not hypothyroid.  He does not smoke.  He has a history of cervical spine fusion and heart valve replacement.  He is on Xarelto.      REVIEW OF SYSTEMS       A 30-item multi-system Review Of Systems was obtained on today's intake form.  This was reviewed with the patient and is correct.  The pertinent positives and negatives are listed above.  The form has been scanned separately into the medical record.      PHYSICAL EXAM    Constitutional:    Appears stated age. Well-developed and well-nourished obese male in no acute distress.  Psychiatric:         Pleasant normal mood and affect. Behavior is appropriate for the situation.   Head:                   Normocephalic and atraumatic.  Eyes:                    Pupils are equal and round.  Cardiovascular:  2+ radial and ulnar pulses. Fingers well-perfused.  Respiratory:        Effort normal. No respiratory distress. Speaking in complete sentences.  Neurologic:       Alert and oriented to person, place, and time.  Skin:                Skin is intact, warm and dry.  Hematologic / Lymphatic:    No lymphedema or lymphangitis.    Extremities / Musculoskeletal:    Left hand:              Well-healed scar over the carpal tunnel.  Approximately 30 degree flexion contracture of the PIP joint in the index, long, and ring fingers.  No improvement of flexion  contracture with flexion of the MCP joint.  No tenderness to palpation of the PIP joint.  No evidence of pretendinous or spiral cords.  Hand warm and well-perfused.  Positive Thomas's compression test with evidence of thenar atrophy.  Negative CMC grind test.  Joints are stable to varus and valgus stress.  Good sagittal plane balance.    Right hand:   Approximately 30 degree flexion contracture of the PIP joint and the long and ring fingers.  No improvement of flexion contracture with flexion of the MCP joint.  No tenderness to palpation of the PIP joints.  No evidence of pretendinous or spiral cords.  Hand warm and well-perfused.  Positive Durkan's compression test with evidence of thenar atrophy.  Negative CMC grind test.  Good sagittal plane balance.  Stable joints.      Sensation intact to light touch in all distributions.  Capillary refill less than 2 seconds.      IMAGING / LABS / EMGs           X-rays of both hands on January 22 were independently interpreted by me today and demonstrate degenerative changes most severely affecting the STT and first CMC joint.  There is calcification of the artery.      Past Medical History:   Diagnosis Date    Cerebral infarction, unspecified (CMS/[facility]) 07/22/2020    Brainstem stroke    Encounter for screening for malignant neoplasm of rectum     Encounter for screening for malignant neoplasm of rectum    Impacted cerumen, bilateral 12/05/2016    Impacted cerumen of both ears    Inguinal hernia 02/07/2023    Nocturia     Nocturia    Other conditions influencing health status     Screening for malignant neoplasms, colon    Other spondylosis with myelopathy, cervical region 02/07/2023    Personal history of other diseases of male genital organs 02/15/2019    History of chronic prostatitis    Personal history of other diseases of the circulatory system 04/08/2015    History of angina pectoris    Personal history of other diseases of the respiratory system 10/14/2015    History of  influenza    Personal history of other drug therapy 2014    History of pneumococcal vaccination    Personal history of other specified conditions 2019    History of odynophagia    Personal history of other specified conditions     History of edema    Radiculopathy, cervical region     Cervical radiculopathy    Stenosis, cervical spine 2023    Unspecified hemorrhoids     Bleeding hemorrhoids       Medication Documentation Review Audit       Reviewed by Cecilia Soria MA (Medical Assistant) on 02/15/24 at 1046      Medication Order Taking? Sig Documenting Provider Last Dose Status   amLODIPine (Norvasc) 5 mg tablet [id number] Yes Take 1 tablet (5 mg) by mouth once daily. Misa Muir MD Taking Active   aspirin 81 mg capsule [id number] Yes Take 1 tablet by mouth 1 (one) time each day. Historical Provider, MD Taking Active   atorvastatin (Lipitor) 80 mg tablet [id number] Yes Take 1 tablet (80 mg) by mouth once daily. Misa Muir MD Taking Active   chlorthalidone (Hygroton) 25 mg tablet [id number] Yes TAKE 1 TABLET ONE TIME DAILY Misa Muir MD Taking Active   cyclobenzaprine (Flexeril) 10 mg tablet [id number]  Take 1 tablet (10 mg) by mouth as needed at bedtime for muscle spasms. Misa Muir MD   24 2359   ezetimibe (Zetia) 10 mg tablet [id number] Yes TAKE 1 TABLET EVERY DAY Misa Muir MD Taking Active   finasteride (Proscar) 5 mg tablet [id number] Yes Take 1 tablet (5 mg) by mouth once daily. Misa Muir MD Taking Active   losartan (Cozaar) 100 mg tablet [id number] Yes Take 1 tablet (100 mg) by mouth once daily. Misa Muir MD Taking Active   metFORMIN XR (Glucophage-XR) 750 mg 24 hr tablet [id number] Yes Take 1 tablet (750 mg) by mouth once daily in the evening. Take with meals. Do not crush, chew, or split. Misa Muir MD Taking Active   pantoprazole (ProtoNix) 40 mg EC tablet [id number] Yes Take 1 tablet  (40 mg) by mouth once daily in the morning. Take before meals. Do not crush, chew, or split. Misa Muir MD Taking Active   potassium chloride CR 20 mEq ER tablet [id number] Yes TAKE 1 TABLET ONE TIME DAILY Misa Muir MD Taking Active   rivaroxaban (Xarelto) 20 mg tablet [id number] Yes Take 1 tablet (20 mg) by mouth once daily in the evening. Take with meals. Misa Muir MD Taking Active   spironolactone (Aldactone) 25 mg tablet [id number] Yes Take 1 tablet (25 mg) by mouth once daily. Misa Muir MD Taking Active                    Allergies   Allergen Reactions    Iodinated Contrast Media Hives    Latex Unknown       Social History     Socioeconomic History    Marital status:      Spouse name: Not on file    Number of children: Not on file    Years of education: Not on file    Highest education level: Not on file   Occupational History    Not on file   Tobacco Use    Smoking status: Former     Types: Cigarettes     Passive exposure: Never    Smokeless tobacco: Never   Substance and Sexual Activity    Alcohol use: Yes     Comment: OCASSIONALLY    Drug use: Never    Sexual activity: Not on file   Other Topics Concern    Not on file   Social History Narrative    Not on file     Social Determinants of Health     Financial Resource Strain: Not on file   Food Insecurity: Not on file   Transportation Needs: Not on file   Physical Activity: Not on file   Stress: Not on file   Social Connections: Not on file   Intimate Partner Violence: Not on file   Housing Stability: Not on file       Past Surgical History:   Procedure Laterality Date    CERVICAL FUSION  10/03/2013    Cervical Vertebral Fusion    KNEE SURGERY  12/11/2014    Knee Surgery    MR HEAD ANGIO WO IV CONTRAST  6/21/2020    MR HEAD ANGIO WO IV CONTRAST 6/21/2020 [facility]    MR HEAD ANGIO WO IV CONTRAST  10/30/2022    MR HEAD ANGIO WO IV CONTRAST 10/30/2022 [facility]    MR NECK ANGIO WO  IV CONTRAST  6/21/2020    MR NECK ANGIO WO IV CONTRAST 6/21/2020 [facility]    MR NECK ANGIO WO IV CONTRAST  10/30/2022    MR NECK ANGIO WO IV CONTRAST 10/30/2022 [facility]    OTHER SURGICAL HISTORY  01/09/2019    Tonsillectomy    OTHER SURGICAL HISTORY  01/09/2019    Adenoidectomy    OTHER SURGICAL HISTORY  02/18/2020    Aortic valve replacement    PILONIDAL CYST DRAINAGE  12/11/2014    Pilonidal Cyst Resection       ATTESTATION    I saw and evaluated the patient. I personally obtained the key and critical portions of the history and physical exam or was physically present for key and critical portions performed by the resident/fellow. I reviewed the resident/fellow's documentation and discussed the patient with the resident/fellow. I agree with the resident/fellow's medical decision making as documented in the note.        Electronically signed  EMMA Hendricks MD  [phone]

## 2024-02-27 NOTE — LETTER
March 30, 2024     Misa Muir MD  [address] [facility] [address]  [facility] [address]    Patient: Cristy Forte   YOB: 1945   Date of Visit: 2/27/2024       Dear Dr. Misa Muir MD:    Thank you for referring Cristy Forte to me for evaluation. Below are my notes for this consultation.  If you have questions, please do not hesitate to call me. I look forward to following your patient along with you.       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  There is no overlying skin cord or skin pitting to suggest Dupuytren's disease as the cause.  They are not classic for muscle imbalance contractures.  I am not sure of the original etiology.  We had a long discussion of about the options for management, and the role of daily stretching to try to slow down any progression.  I reviewed the option of formal open surgical contracture release, but also the significant risks of the procedure.  30 ° is the break even point on these contractures, where it is a 50-50 shot whether surgery would improve things or actually make them worse.  As such, I would not suggest any surgical intervention at this time, though it would become a better batch if the contracture should progress further.    Follow-up with any additional concerns.        Bilateral carpal tunnel syndrome    The nature of carpal tunnel syndrome was reviewed, along with the slowly progressive natural history.  The options for management were reviewed, including night splinting, cortisone injection, or surgical carpal tunnel release.  The major benefits and risks of surgery were specifically reviewed, as was the  postoperative rehabilitation course.    Before trying anything invasive, the patient wanted to try a course of night splinting.  Proper splint use was reviewed.  Followup in 4 weeks if things are not improving to their satisfaction.        HISTORY OF PRESENT ILLNESS       Patient is a 78 y.o. right-hand dominant male retired, who presents today as a referral from Rheumatology for evaluation of his bilateral finger flexion contractures.  He reports contractures in his right and left hand most pronounced in his ring and middle fingers that has started to prevent him from picking up objects.  This is at the PIP joints.  There is no popping, clicking, or subjective instability.  No recalled antecedent trauma in these digits.    He also notes bilateral hand numbness and tingling that wakes him from sleep at night.  He reports having to shake out his hands to resolve the symptoms.  He has a history of a left carpal tunnel release performed approximately 4 to 5 years ago but he does not remember who performed the surgery.  From our records, it looks like it was Dr. Cooney in 2018.  He has not tried nighttime splints or injections into his right carpal tunnel.  He is not interested in surgery at this time.    He is diabetic but not hypothyroid.  He does not smoke.  He has a history of cervical spine fusion and heart valve replacement.  He is on Xarelto.      REVIEW OF SYSTEMS       A 30-item multi-system Review Of Systems was obtained on today's intake form.  This was reviewed with the patient and is correct.  The pertinent positives and negatives are listed above.  The form has been scanned separately into the medical record.      PHYSICAL EXAM    Constitutional:    Appears stated age. Well-developed and well-nourished obese male in no acute distress.  Psychiatric:         Pleasant normal mood and affect. Behavior is appropriate for the situation.   Head:                   Normocephalic and atraumatic.  Eyes:                     Pupils are equal and round.  Cardiovascular:  2+ radial and ulnar pulses. Fingers well-perfused.  Respiratory:        Effort normal. No respiratory distress. Speaking in complete sentences.  Neurologic:       Alert and oriented to person, place, and time.  Skin:                Skin is intact, warm and dry.  Hematologic / Lymphatic:    No lymphedema or lymphangitis.    Extremities / Musculoskeletal:    Left hand:              Well-healed scar over the carpal tunnel.  Approximately 30 degree flexion contracture of the PIP joint in the index, long, and ring fingers.  No improvement of flexion contracture with flexion of the MCP joint.  No tenderness to palpation of the PIP joint.  No evidence of pretendinous or spiral cords.  Hand warm and well-perfused.  Positive Thomas's compression test with evidence of thenar atrophy.  Negative CMC grind test.  Joints are stable to varus and valgus stress.  Good sagittal plane balance.    Right hand:   Approximately 30 degree flexion contracture of the PIP joint and the long and ring fingers.  No improvement of flexion contracture with flexion of the MCP joint.  No tenderness to palpation of the PIP joints.  No evidence of pretendinous or spiral cords.  Hand warm and well-perfused.  Positive Durkan's compression test with evidence of thenar atrophy.  Negative CMC grind test.  Good sagittal plane balance.  Stable joints.      Sensation intact to light touch in all distributions.  Capillary refill less than 2 seconds.      IMAGING / LABS / EMGs           X-rays of both hands on January 22 were independently interpreted by me today and demonstrate degenerative changes most severely affecting the STT and first CMC joint.  There is calcification of the artery.      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   • Encounter for screening for malignant neoplasm of rectum     Encounter for screening for malignant  neoplasm of rectum   • Impacted cerumen, bilateral 2016    Impacted cerumen of both ears   • Inguinal hernia 2023   • Nocturia     Nocturia   • Other conditions influencing health status     Screening for malignant neoplasms, colon   • Other spondylosis with myelopathy, cervical region 2023   • Personal history of other diseases of male genital organs 02/15/2019    History of chronic prostatitis   • Personal history of other diseases of the circulatory system 2015    History of angina pectoris   • Personal history of other diseases of the respiratory system 10/14/2015    History of influenza   • Personal history of other drug therapy 2014    History of pneumococcal vaccination   • Personal history of other specified conditions 2019    History of odynophagia   • Personal history of other specified conditions     History of edema   • Radiculopathy, cervical region     Cervical radiculopathy   • Stenosis, cervical spine 2023   • Unspecified hemorrhoids     Bleeding hemorrhoids       Medication Documentation Review Audit       Reviewed by Cecilia Soria MA (Medical Assistant) on 02/15/24 at 1046      Medication Order Taking? Sig Documenting Provider Last Dose Status   amLODIPine (Norvasc) 5 mg tablet [id number] Yes Take 1 tablet (5 mg) by mouth once daily. Misa Muir MD Taking Active   aspirin 81 mg capsule [id number] Yes Take 1 tablet by mouth 1 (one) time each day. Historical Provider, MD Taking Active   atorvastatin (Lipitor) 80 mg tablet [id number] Yes Take 1 tablet (80 mg) by mouth once daily. Misa Muir MD Taking Active   chlorthalidone (Hygroton) 25 mg tablet [id number] Yes TAKE 1 TABLET ONE TIME DAILY Misa Muir MD Taking Active   cyclobenzaprine (Flexeril) 10 mg tablet [id number]  Take 1 tablet (10 mg) by mouth as needed at bedtime for muscle spasms. Misa Muir MD   [id number]   ezetimibe (Zetia) 10 mg tablet  [id number] Yes TAKE 1 TABLET EVERY DAY Misa Muir MD Taking Active   finasteride (Proscar) 5 mg tablet [id number] Yes Take 1 tablet (5 mg) by mouth once daily. Misa Muir MD Taking Active   losartan (Cozaar) 100 mg tablet [id number] Yes Take 1 tablet (100 mg) by mouth once daily. Misa Muir MD Taking Active   metFORMIN XR (Glucophage-XR) 750 mg 24 hr tablet [id number] Yes Take 1 tablet (750 mg) by mouth once daily in the evening. Take with meals. Do not crush, chew, or split. Misa Muir MD Taking Active   pantoprazole (ProtoNix) 40 mg EC tablet [id number] Yes Take 1 tablet (40 mg) by mouth once daily in the morning. Take before meals. Do not crush, chew, or split. Misa Muir MD Taking Active   potassium chloride CR 20 mEq ER tablet [id number] Yes TAKE 1 TABLET ONE TIME DAILY Misa Muir MD Taking Active   rivaroxaban (Xarelto) 20 mg tablet [id number] Yes Take 1 tablet (20 mg) by mouth once daily in the evening. Take with meals. Misa Muir MD Taking Active   spironolactone (Aldactone) 25 mg tablet [id number] Yes Take 1 tablet (25 mg) by mouth once daily. Misa Muir MD Taking Active                    Allergies   Allergen Reactions   • Iodinated Contrast Media Hives   • Latex Unknown       Social History     Socioeconomic History   • Marital status:      Spouse name: Not on file   • Number of children: Not on file   • Years of education: Not on file   • Highest education level: Not on file   Occupational History   • Not on file   Tobacco Use   • Smoking status: Former     Types: Cigarettes     Passive exposure: Never   • Smokeless tobacco: Never   Substance and Sexual Activity   • Alcohol use: Yes     Comment: OCASSIONALLY   • Drug use: Never   • Sexual activity: Not on file   Other Topics Concern   • Not on file   Social History Narrative   • Not on file     Social Determinants of Health     Financial Resource  Strain: Not on file   Food Insecurity: Not on file   Transportation Needs: Not on file   Physical Activity: Not on file   Stress: Not on file   Social Connections: Not on file   Intimate Partner Violence: Not on file   Housing Stability: Not on file       Past Surgical History:   Procedure Laterality Date   • CERVICAL FUSION  10/03/2013    Cervical Vertebral Fusion   • KNEE SURGERY  12/11/2014    Knee Surgery   • MR HEAD ANGIO WO IV CONTRAST  6/21/2020    MR HEAD ANGIO WO IV CONTRAST 6/21/2020 [facility]   • MR HEAD ANGIO WO IV CONTRAST  10/30/2022    MR HEAD ANGIO WO IV CONTRAST 10/30/2022 [facility]   • MR NECK ANGIO WO IV CONTRAST  6/21/2020    MR NECK ANGIO WO IV CONTRAST 6/21/2020 [facility]   • MR NECK ANGIO WO IV CONTRAST  10/30/2022    MR NECK ANGIO WO IV CONTRAST 10/30/2022 [facility]   • OTHER SURGICAL HISTORY  01/09/2019    Tonsillectomy   • OTHER SURGICAL HISTORY  01/09/2019    Adenoidectomy   • OTHER SURGICAL HISTORY  02/18/2020    Aortic valve replacement   • PILONIDAL CYST DRAINAGE  12/11/2014    Pilonidal Cyst Resection       ATTESTATION    I saw and evaluated the patient. I personally obtained the key and critical portions of the history and physical exam or was physically present for key and critical portions performed by the resident/fellow. I reviewed the resident/fellow's documentation and discussed the patient with the resident/fellow. I agree with the resident/fellow's medical decision making as documented in the note.        Electronically signed  EMMA Hendricks MD  [phone]

## 2024-02-28 ENCOUNTER — TREATMENT (OUTPATIENT)
Dept: OCCUPATIONAL THERAPY | Facility: CLINIC | Age: 79
End: 2024-02-28
Payer: MEDICARE

## 2024-02-28 DIAGNOSIS — M54.12 CERVICAL RADICULOPATHY: ICD-10-CM

## 2024-02-28 DIAGNOSIS — R27.8 DECREASED COORDINATION: Primary | ICD-10-CM

## 2024-02-28 DIAGNOSIS — R29.898 UPPER EXTREMITY WEAKNESS: ICD-10-CM

## 2024-02-28 PROCEDURE — 97110 THERAPEUTIC EXERCISES: CPT | Mod: GO

## 2024-02-28 ASSESSMENT — PAIN SCALES - GENERAL: PAINLEVEL_OUTOF10: 0 - NO PAIN

## 2024-02-28 ASSESSMENT — PAIN - FUNCTIONAL ASSESSMENT: PAIN_FUNCTIONAL_ASSESSMENT: 0-10

## 2024-02-28 NOTE — PROGRESS NOTES
Occupational Therapy    Occupational Therapy Treatment    Name: Cristy Forte  MRN: [id number]  : 1945  Date: 24  Time Calculation  Start Time: 09  Stop Time: 1015  Time Calculation (min): 45 min     OT Therapeutic Procedures Time Entry  Therapeutic Exercise Time Entry: 40                  Visit: 4  Medicare: 20 visits  Medicare auth: [id number]  Assessment:   Patient demo'd good carryover of exercises. Patient reports at nighttime less frequent pain in the RUE.   **I suggested to patient to start to take BP regularly at home (has a BP cuff, but states doesn't use it), due to reporting feeling light headed occasionally at home. I told him his BP meds may need to be adjusted, and to call his physician.   Plan:   Continue with skilled OT POC     Subjective   Patient agreeable to treatment session. No new issues to report.     Pain Assessment:  Pain Assessment  Pain Assessment: 0-10  Pain Score: 0 - No pain    Vitals:  BP: 100/50  In sitting L arm. after exercises. 10:05am. Had patient lay down on mat with feet elevated. BP went up to 110/62. No light headedness.     Objective    Therapeutic Exercise: 40 mins  Completed SciFit bike to increase ROM and endurance of BUE's. Completed 5 mins forwards and 5 mins backwards on level 3.0, RPM 52. Patient took a rest break between sets of 5 minutes.    Completed blue theraband exercises to BUE's, 2 sets of 10 each: shoulder extension, rows, external rotation, elbow flexion. Cues given for proper technique    OP EDUCATION:   Educated on theraband exercises. Handouts given.    Goals:  Active       OT Goals       HEP       Start:  24    Expected End:  24       Patient will be independent with established HEP including UE stretching, ROM, and strengthening exercises         Strength       Start:  24    Expected End:  24       Patient will increase B  strength by 5# or greater for ADL/IADL tasks         ADL       Start:  24     Expected End:  04/05/24       Patient will understand adaptive equipment and modification training to increase efficiency and independence with ADL/IADL tasks.

## 2024-03-05 ENCOUNTER — OFFICE VISIT (OUTPATIENT)
Dept: CARDIOLOGY | Facility: HOSPITAL | Age: 79
End: 2024-03-05
Payer: MEDICARE

## 2024-03-05 VITALS
DIASTOLIC BLOOD PRESSURE: 75 MMHG | SYSTOLIC BLOOD PRESSURE: 120 MMHG | WEIGHT: 242 LBS | BODY MASS INDEX: 33.88 KG/M2 | HEIGHT: 71 IN | HEART RATE: 83 BPM

## 2024-03-05 DIAGNOSIS — I48.11 LONGSTANDING PERSISTENT ATRIAL FIBRILLATION (MULTI): Primary | ICD-10-CM

## 2024-03-05 DIAGNOSIS — Z95.2 S/P AVR: ICD-10-CM

## 2024-03-05 PROCEDURE — 93005 ELECTROCARDIOGRAM TRACING: CPT | Performed by: INTERNAL MEDICINE

## 2024-03-05 PROCEDURE — 1036F TOBACCO NON-USER: CPT | Performed by: INTERNAL MEDICINE

## 2024-03-05 PROCEDURE — 1159F MED LIST DOCD IN RCRD: CPT | Performed by: INTERNAL MEDICINE

## 2024-03-05 PROCEDURE — 93010 ELECTROCARDIOGRAM REPORT: CPT | Performed by: INTERNAL MEDICINE

## 2024-03-05 PROCEDURE — 1126F AMNT PAIN NOTED NONE PRSNT: CPT | Performed by: INTERNAL MEDICINE

## 2024-03-05 PROCEDURE — 3078F DIAST BP <80 MM HG: CPT | Performed by: INTERNAL MEDICINE

## 2024-03-05 PROCEDURE — 3074F SYST BP LT 130 MM HG: CPT | Performed by: INTERNAL MEDICINE

## 2024-03-05 PROCEDURE — 99214 OFFICE O/P EST MOD 30 MIN: CPT | Performed by: INTERNAL MEDICINE

## 2024-03-05 PROCEDURE — 1160F RVW MEDS BY RX/DR IN RCRD: CPT | Performed by: INTERNAL MEDICINE

## 2024-03-05 NOTE — PROGRESS NOTES
Subjective:  Patient returns for a routine 6-month follow-up.  He is a delightful 78-year-old gentleman.  He has known coronary disease and is status post proximal to mid LAD stenting with overlapping stents back in 2015.  He has been doing well from a coronary disease standpoint.  He also had an AVR in 2018 for severe aortic stenosis.  He also has had a relatively minor stroke in the past from presumed atrial fibrillation.  He has been on anticoagulation.  He generally is doing well although he has put on a bit of weight due to being too sedentary.  He has not had any hospitalizations.  He is taking all of his medications compliantly and is tolerating them well.  He denies any other new health concerns.  He has not had any bleeding problems despite good compliance with his aspirin and Xarelto.  Of note his echocardiogram from last September generally look good with an essentially normally functioning prosthetic aortic valve.    Objective:  General: Alert, pleasant middle-age male in no distress.  HEENT: Unchanged.  Lungs: Clear without crackles.  Cardiac: Normal S1 and S2 with 1-2/6 systolic murmur.  Abdomen: Nontender with normal bowel sounds.  Extremities: No edema.  Skin: No rash.  Neuro: Grossly intact.    EKG: Sinus rhythm with PACs and PVCs.  Nonspecific T wave abnormality.    Impression/plan:  Cristy is generally doing quite nicely at this time.  His blood pressure is under excellent control.  He is not having any evidence of breakthrough atrial fibrillation.  He remains on appropriate aspirin therapy to protect against stent thrombosis.  He also remains appropriately anticoagulated with Xarelto to prevent recurrent TIAs/strokes.  His lipid panels have historically been in very good range on high-dose statin therapy along with Zetia.    Given his clinical stability, I did not want to make any medication changes and will not embark on any repeat testing immediately.  I will see him back for routine follow-up  in 6 months, and we will recheck his prosthetic aortic valve with an echocardiogram at that time.  He does call for any intercurrent concerns and he did not need any prescriptions renewed.    Patient instructions:    Continue current medications unchanged.    Return to clinic in 6 months for follow-up office visit and repeat echocardiogram.

## 2024-03-06 ENCOUNTER — TREATMENT (OUTPATIENT)
Dept: OCCUPATIONAL THERAPY | Facility: CLINIC | Age: 79
End: 2024-03-06
Payer: MEDICARE

## 2024-03-06 DIAGNOSIS — R27.8 DECREASED COORDINATION: Primary | ICD-10-CM

## 2024-03-06 DIAGNOSIS — M54.12 CERVICAL RADICULOPATHY: ICD-10-CM

## 2024-03-06 DIAGNOSIS — R29.898 UPPER EXTREMITY WEAKNESS: ICD-10-CM

## 2024-03-06 LAB
ATRIAL RATE: 83 BPM
P AXIS: 50 DEGREES
P OFFSET: 194 MS
P ONSET: 134 MS
PR INTERVAL: 178 MS
Q ONSET: 223 MS
QRS COUNT: 14 BEATS
QRS DURATION: 78 MS
QT INTERVAL: 388 MS
QTC CALCULATION(BAZETT): 455 MS
QTC FREDERICIA: 432 MS
R AXIS: -24 DEGREES
T AXIS: 84 DEGREES
T OFFSET: 417 MS
VENTRICULAR RATE: 83 BPM

## 2024-03-06 PROCEDURE — 97110 THERAPEUTIC EXERCISES: CPT | Mod: GO

## 2024-03-06 PROCEDURE — 97140 MANUAL THERAPY 1/> REGIONS: CPT | Mod: GO

## 2024-03-06 ASSESSMENT — PAIN SCALES - GENERAL: PAINLEVEL_OUTOF10: 0 - NO PAIN

## 2024-03-06 ASSESSMENT — PAIN - FUNCTIONAL ASSESSMENT: PAIN_FUNCTIONAL_ASSESSMENT: 0-10

## 2024-03-06 NOTE — PROGRESS NOTES
"Occupational Therapy    Occupational Therapy Treatment    Name: Cristy Forte  MRN: [id number]  : 1945  Date: 24  Time Calculation  Start Time: 915  Stop Time: 1000  Time Calculation (min): 45 min     OT Therapeutic Procedures Time Entry  Manual Therapy Time Entry: 15  Therapeutic Exercise Time Entry: 25                  Visit: 5  Medicare: 20 visits  Medicare auth: 24-24  Assessment:   Patient responded well to treatment session, demonstrating increased B  strength. We discussed the possibility of going to PT for neck/back stretches and exercises.  Plan:   Continue with skilled OT POC. 1 more visit before discharge     Subjective   Patient agreeable to treatment session. \"I got an arm bike for home\"    Pain Assessment:  Pain Assessment  Pain Assessment: 0-10  Pain Score: 0 - No pain    Vitals:  120/66 BP left arm  at 9:39pm  Objective      Manual Therapy: 15 mins  B scapular mobs completed: elevation/depression, abduction, adduction    Therapeutic Exercise: 25 mins  Completed SciFit bike to increase ROM and endurance of BUE's. Completed 5 mins forwards and 5 mins backwards on level 4.0, RPM 49. Patient took a rest break between sets of 5 minutes.    Completed 2 sets 10 of the following cane exercises in supine to promote stretch to BUE's: shoulder flexion, shoulder protraction, external rotation    R : 54#  L : 53#    OP EDUCATION:   Patient wsa educated on the importance of doing the stretches at home, and not just doing the strengthening/band exercises to promote balance.    Goals:  Active       OT Goals       HEP       Start:  24    Expected End:  24       Patient will be independent with established HEP including UE stretching, ROM, and strengthening exercises         Strength       Start:  24    Expected End:  24       Patient will increase B  strength by 5# or greater for ADL/IADL tasks         ADL       Start:  24    Expected End:  " 04/05/24       Patient will understand adaptive equipment and modification training to increase efficiency and independence with ADL/IADL tasks.

## 2024-03-13 ENCOUNTER — TREATMENT (OUTPATIENT)
Dept: OCCUPATIONAL THERAPY | Facility: CLINIC | Age: 79
End: 2024-03-13
Payer: MEDICARE

## 2024-03-13 DIAGNOSIS — R27.8 DECREASED COORDINATION: Primary | ICD-10-CM

## 2024-03-13 DIAGNOSIS — M54.12 CERVICAL RADICULOPATHY: ICD-10-CM

## 2024-03-13 DIAGNOSIS — R29.898 UPPER EXTREMITY WEAKNESS: ICD-10-CM

## 2024-03-13 PROCEDURE — 97110 THERAPEUTIC EXERCISES: CPT | Mod: GO

## 2024-03-13 PROCEDURE — 97140 MANUAL THERAPY 1/> REGIONS: CPT | Mod: GO

## 2024-03-13 ASSESSMENT — PAIN SCALES - GENERAL: PAINLEVEL_OUTOF10: 0 - NO PAIN

## 2024-03-13 ASSESSMENT — PAIN - FUNCTIONAL ASSESSMENT: PAIN_FUNCTIONAL_ASSESSMENT: 0-10

## 2024-03-13 NOTE — PROGRESS NOTES
Occupational Therapy    Occupational Therapy Treatment    Name: Cristy Forte  MRN: [id number]  : 1945  Date: 24  Time Calculation  Start Time: 930  Stop Time: 0  Time Calculation (min): 40 min     OT Therapeutic Procedures Time Entry  Manual Therapy Time Entry: 15  Therapeutic Exercise Time Entry: 25                  Visit: 6  Medicare: 20 visits  Medicare auth: 24-24  Assessment:   Patient reports has not had any pain at nighttime in the RUE over the last couple of weeks. Patient's B  strength has also improved. Overall, patient has made good progress in occupational therapy. Patient is in agreement to continue with home program after discharge.  Plan:   No further visits indicated. Discharge patient  Patient in agreement with plan    Subjective   Patient agreeable to treatment session. No new issues to report.    Pain Assessment:  Pain Assessment  Pain Assessment: 0-10  Pain Score: 0 - No pain      Objective    Manual Therapy: 15 mins  B scapular mobs completed: elevation/depression, abduction, adduction    Therapeutic Exercise: 25 mins  Completed CellectisFit bike to increase ROM and endurance of BUE's. Completed 5 mins forwards and 5 mins backwards on level 4.57, RPM 38. Patient took a rest break between sets of 5 minutes.    Completed green flex bar to B wrists/forearms: pronation, supination, wrist flexion, wrist extension    Completed red power web exercises to B hands: hook grasp, composite extension    R : 54#  L : 53#    OP EDUCATION:   Continue with HEP after discharge    Goals:  Resolved       OT Goals       HEP (Met)       Start:  24    Expected End:  24    Resolved:  24    Patient will be independent with established HEP including UE stretching, ROM, and strengthening exercises         Strength (Met)       Start:  24    Expected End:  24    Resolved:  24    Patient will increase B  strength by 5# or greater for ADL/IADL tasks          ADL (Met)       Start:  02/05/24    Expected End:  04/05/24    Resolved:  03/13/24    Patient will understand adaptive equipment and modification training to increase efficiency and independence with ADL/IADL tasks.

## 2024-03-18 ENCOUNTER — APPOINTMENT (OUTPATIENT)
Dept: RADIOLOGY | Facility: HOSPITAL | Age: 79
End: 2024-03-18
Payer: MEDICARE

## 2024-03-18 ENCOUNTER — HOSPITAL ENCOUNTER (EMERGENCY)
Facility: HOSPITAL | Age: 79
Discharge: HOME | End: 2024-03-19
Attending: EMERGENCY MEDICINE
Payer: MEDICARE

## 2024-03-18 ENCOUNTER — APPOINTMENT (OUTPATIENT)
Dept: CARDIOLOGY | Facility: HOSPITAL | Age: 79
End: 2024-03-18
Payer: MEDICARE

## 2024-03-18 DIAGNOSIS — R42 LIGHTHEADEDNESS: Primary | ICD-10-CM

## 2024-03-18 LAB
ALBUMIN SERPL BCP-MCNC: 4.1 G/DL (ref 3.4–5)
ALP SERPL-CCNC: 63 U/L (ref 33–136)
ALT SERPL W P-5'-P-CCNC: 26 U/L (ref 10–52)
ANION GAP SERPL CALC-SCNC: 12 MMOL/L (ref 10–20)
AST SERPL W P-5'-P-CCNC: 24 U/L (ref 9–39)
ATRIAL RATE: 79 BPM
BASOPHILS # BLD AUTO: 0.03 X10*3/UL (ref 0–0.1)
BASOPHILS NFR BLD AUTO: 0.5 %
BILIRUB SERPL-MCNC: 0.4 MG/DL (ref 0–1.2)
BNP SERPL-MCNC: 64 PG/ML (ref 0–99)
BUN SERPL-MCNC: 21 MG/DL (ref 6–23)
CALCIUM SERPL-MCNC: 9.3 MG/DL (ref 8.6–10.3)
CARDIAC TROPONIN I PNL SERPL HS: 9 NG/L (ref 0–20)
CHLORIDE SERPL-SCNC: 104 MMOL/L (ref 98–107)
CO2 SERPL-SCNC: 26 MMOL/L (ref 21–32)
CREAT SERPL-MCNC: 0.95 MG/DL (ref 0.5–1.3)
EGFRCR SERPLBLD CKD-EPI 2021: 83 ML/MIN/1.73M*2
EOSINOPHIL # BLD AUTO: 0.03 X10*3/UL (ref 0–0.4)
EOSINOPHIL NFR BLD AUTO: 0.5 %
ERYTHROCYTE [DISTWIDTH] IN BLOOD BY AUTOMATED COUNT: 15.4 % (ref 11.5–14.5)
GLUCOSE SERPL-MCNC: 88 MG/DL (ref 74–99)
HCT VFR BLD AUTO: 43.4 % (ref 41–52)
HGB BLD-MCNC: 13.8 G/DL (ref 13.5–17.5)
IMM GRANULOCYTES # BLD AUTO: 0.01 X10*3/UL (ref 0–0.5)
IMM GRANULOCYTES NFR BLD AUTO: 0.2 % (ref 0–0.9)
LYMPHOCYTES # BLD AUTO: 1.08 X10*3/UL (ref 0.8–3)
LYMPHOCYTES NFR BLD AUTO: 18 %
MAGNESIUM SERPL-MCNC: 1.6 MG/DL (ref 1.6–2.4)
MCH RBC QN AUTO: 26 PG (ref 26–34)
MCHC RBC AUTO-ENTMCNC: 31.8 G/DL (ref 32–36)
MCV RBC AUTO: 82 FL (ref 80–100)
MONOCYTES # BLD AUTO: 0.59 X10*3/UL (ref 0.05–0.8)
MONOCYTES NFR BLD AUTO: 9.8 %
NEUTROPHILS # BLD AUTO: 4.25 X10*3/UL (ref 1.6–5.5)
NEUTROPHILS NFR BLD AUTO: 71 %
NRBC BLD-RTO: 0 /100 WBCS (ref 0–0)
P AXIS: 48 DEGREES
P OFFSET: 194 MS
P ONSET: 137 MS
PLATELET # BLD AUTO: 195 X10*3/UL (ref 150–450)
POTASSIUM SERPL-SCNC: 3.8 MMOL/L (ref 3.5–5.3)
PR INTERVAL: 170 MS
PROT SERPL-MCNC: 7.2 G/DL (ref 6.4–8.2)
Q ONSET: 222 MS
QRS COUNT: 13 BEATS
QRS DURATION: 82 MS
QT INTERVAL: 412 MS
QTC CALCULATION(BAZETT): 472 MS
QTC FREDERICIA: 451 MS
R AXIS: -6 DEGREES
RBC # BLD AUTO: 5.3 X10*6/UL (ref 4.5–5.9)
SODIUM SERPL-SCNC: 138 MMOL/L (ref 136–145)
T AXIS: 55 DEGREES
T OFFSET: 428 MS
TSH SERPL-ACNC: 2.01 MIU/L (ref 0.44–3.98)
VENTRICULAR RATE: 79 BPM
WBC # BLD AUTO: 6 X10*3/UL (ref 4.4–11.3)

## 2024-03-18 PROCEDURE — 85025 COMPLETE CBC W/AUTO DIFF WBC: CPT | Performed by: STUDENT IN AN ORGANIZED HEALTH CARE EDUCATION/TRAINING PROGRAM

## 2024-03-18 PROCEDURE — 72125 CT NECK SPINE W/O DYE: CPT | Performed by: RADIOLOGY

## 2024-03-18 PROCEDURE — 80053 COMPREHEN METABOLIC PANEL: CPT | Performed by: EMERGENCY MEDICINE

## 2024-03-18 PROCEDURE — 36415 COLL VENOUS BLD VENIPUNCTURE: CPT | Performed by: STUDENT IN AN ORGANIZED HEALTH CARE EDUCATION/TRAINING PROGRAM

## 2024-03-18 PROCEDURE — 83735 ASSAY OF MAGNESIUM: CPT | Performed by: PHYSICIAN ASSISTANT

## 2024-03-18 PROCEDURE — 70450 CT HEAD/BRAIN W/O DYE: CPT

## 2024-03-18 PROCEDURE — 71046 X-RAY EXAM CHEST 2 VIEWS: CPT

## 2024-03-18 PROCEDURE — 84075 ASSAY ALKALINE PHOSPHATASE: CPT | Performed by: STUDENT IN AN ORGANIZED HEALTH CARE EDUCATION/TRAINING PROGRAM

## 2024-03-18 PROCEDURE — 84484 ASSAY OF TROPONIN QUANT: CPT | Performed by: STUDENT IN AN ORGANIZED HEALTH CARE EDUCATION/TRAINING PROGRAM

## 2024-03-18 PROCEDURE — 84443 ASSAY THYROID STIM HORMONE: CPT | Performed by: PHYSICIAN ASSISTANT

## 2024-03-18 PROCEDURE — 96361 HYDRATE IV INFUSION ADD-ON: CPT

## 2024-03-18 PROCEDURE — 70450 CT HEAD/BRAIN W/O DYE: CPT | Performed by: RADIOLOGY

## 2024-03-18 PROCEDURE — 84484 ASSAY OF TROPONIN QUANT: CPT | Performed by: EMERGENCY MEDICINE

## 2024-03-18 PROCEDURE — 93005 ELECTROCARDIOGRAM TRACING: CPT

## 2024-03-18 PROCEDURE — 82607 VITAMIN B-12: CPT | Mod: AHULAB | Performed by: PHYSICIAN ASSISTANT

## 2024-03-18 PROCEDURE — 85025 COMPLETE CBC W/AUTO DIFF WBC: CPT | Performed by: EMERGENCY MEDICINE

## 2024-03-18 PROCEDURE — 96360 HYDRATION IV INFUSION INIT: CPT

## 2024-03-18 PROCEDURE — 71046 X-RAY EXAM CHEST 2 VIEWS: CPT | Performed by: STUDENT IN AN ORGANIZED HEALTH CARE EDUCATION/TRAINING PROGRAM

## 2024-03-18 PROCEDURE — 83880 ASSAY OF NATRIURETIC PEPTIDE: CPT | Performed by: EMERGENCY MEDICINE

## 2024-03-18 PROCEDURE — 72125 CT NECK SPINE W/O DYE: CPT

## 2024-03-18 PROCEDURE — 99285 EMERGENCY DEPT VISIT HI MDM: CPT

## 2024-03-18 PROCEDURE — 2500000004 HC RX 250 GENERAL PHARMACY W/ HCPCS (ALT 636 FOR OP/ED): Performed by: EMERGENCY MEDICINE

## 2024-03-18 PROCEDURE — 83880 ASSAY OF NATRIURETIC PEPTIDE: CPT | Performed by: STUDENT IN AN ORGANIZED HEALTH CARE EDUCATION/TRAINING PROGRAM

## 2024-03-18 RX ADMIN — SODIUM CHLORIDE, POTASSIUM CHLORIDE, SODIUM LACTATE AND CALCIUM CHLORIDE 1000 ML: 600; 310; 30; 20 INJECTION, SOLUTION INTRAVENOUS at 21:26

## 2024-03-18 ASSESSMENT — COLUMBIA-SUICIDE SEVERITY RATING SCALE - C-SSRS
2. HAVE YOU ACTUALLY HAD ANY THOUGHTS OF KILLING YOURSELF?: NO
6. HAVE YOU EVER DONE ANYTHING, STARTED TO DO ANYTHING, OR PREPARED TO DO ANYTHING TO END YOUR LIFE?: NO
1. IN THE PAST MONTH, HAVE YOU WISHED YOU WERE DEAD OR WISHED YOU COULD GO TO SLEEP AND NOT WAKE UP?: NO

## 2024-03-18 NOTE — ED TRIAGE NOTES
TRIAGE NOTE   I saw the patient as the Clinician in Triage and performed a brief history and physical exam, established acuity, and ordered appropriate tests to develop basic plan of care. Patient will be seen by an [facility], resident and/or physician who will independently evaluate the patient. Please see subsequent provider notes for further details and disposition.     Brief HPI: In brief, Cristy Forte is a 78 y.o. male that presents for lightheadedness, difficulty focusing, and balance issues over the past few months.  This is occasionally accompanied by shortness of breath without chest pain.  Patient is also concerned about his neck as he had a fusion in 2012 and broke a screw slightly after that.  He has had x-rays to survey this area but states that he feels like something has worsened in his neck.  He has some baseline tingling in his fingers since the surgery and related to carpal tunnel syndrome.  States he has not seen an outpatient provider for the lightheadedness and difficulty focusing.    Past medical history includes CAD with LAD stents in 2015, aortic valve replacement in 2018 on Xarelto, hyperlipidemia    Focused Physical exam:   No acute focal deficits, alert and oriented x 3, vital signs normal    Plan/MDM:   Will obtain CT head and CT C-spine without contrast, labs, EKG, chest x-ray.    Please see subsequent provider note for further details and disposition     Eve Mir PA-C

## 2024-03-19 VITALS
OXYGEN SATURATION: 94 % | HEIGHT: 70 IN | DIASTOLIC BLOOD PRESSURE: 92 MMHG | SYSTOLIC BLOOD PRESSURE: 135 MMHG | HEART RATE: 64 BPM | RESPIRATION RATE: 18 BRPM | WEIGHT: 240 LBS | TEMPERATURE: 97.5 F | BODY MASS INDEX: 34.36 KG/M2

## 2024-03-19 LAB — VIT B12 SERPL-MCNC: 410 PG/ML (ref 211–911)

## 2024-03-19 NOTE — DISCHARGE INSTRUCTIONS
You were seen emergency room today for evaluation of lightheadedness.  Your labs and imaging were overall reassuring.  Please follow-up with your primary care provider regard this emergency visit.  Please return if you have worsening symptoms include but not limited to worsening lightheadedness, weakness, numbness, difficulty with coordination, instability, or if you have any other concerns.  Please return if you have severe headache.  Please return if you have double vision or other vision changes.  Please follow-up with your primary care provider via phone tomorrow to inform us ED visit and for consideration of outpatient MRI and further workup.

## 2024-03-21 ENCOUNTER — OFFICE VISIT (OUTPATIENT)
Dept: PRIMARY CARE | Facility: CLINIC | Age: 79
End: 2024-03-21
Payer: MEDICARE

## 2024-03-21 VITALS
TEMPERATURE: 98 F | DIASTOLIC BLOOD PRESSURE: 61 MMHG | BODY MASS INDEX: 34.29 KG/M2 | SYSTOLIC BLOOD PRESSURE: 98 MMHG | HEART RATE: 90 BPM | WEIGHT: 239 LBS

## 2024-03-21 DIAGNOSIS — R42 EPISODE OF DIZZINESS: Primary | ICD-10-CM

## 2024-03-21 PROCEDURE — 1036F TOBACCO NON-USER: CPT | Performed by: FAMILY MEDICINE

## 2024-03-21 PROCEDURE — 3078F DIAST BP <80 MM HG: CPT | Performed by: FAMILY MEDICINE

## 2024-03-21 PROCEDURE — 99214 OFFICE O/P EST MOD 30 MIN: CPT | Performed by: FAMILY MEDICINE

## 2024-03-21 PROCEDURE — 1160F RVW MEDS BY RX/DR IN RCRD: CPT | Performed by: FAMILY MEDICINE

## 2024-03-21 PROCEDURE — 3074F SYST BP LT 130 MM HG: CPT | Performed by: FAMILY MEDICINE

## 2024-03-21 PROCEDURE — 1159F MED LIST DOCD IN RCRD: CPT | Performed by: FAMILY MEDICINE

## 2024-03-21 RX ORDER — MECLIZINE HYDROCHLORIDE 25 MG/1
25 TABLET ORAL 3 TIMES DAILY PRN
Qty: 30 TABLET | Refills: 1 | Status: SHIPPED | OUTPATIENT
Start: 2024-03-21 | End: 2025-03-21

## 2024-03-21 NOTE — PROGRESS NOTES
Subjective   Patient ID: Britta Fan is a 78 y.o. male who presents for Hospital Follow-up.  HPI    BRITTA FAN is a 78 year old Male, with a PMH of HTN, BPH, CAD s/p stenting, afib on xarelto, s/p AVR, right cervical radiculopathy,  lumbar radiculopathy, s/p Bilateral C7 pedicle screw fractures, provoked multiple pulmonary embolism s/p TKA 2010, GERD, ED, adrenal adenoma, here for:    1- post ER visit follow-up from 3/18/2024.  He went in for lightheadedness that started about approximately 2 months. It is getting worse.  Associated with some unsteadiness and vertigo, an episodic right ear tinnitus.      A review of system was completed.  All systems were reviewed and were normal, except for the ones that are listed in the HPI.    Objective   Physical Exam  Constitutional:       Appearance: Normal appearance.   HENT:      Head: Normocephalic and atraumatic.      Right Ear: Tympanic membrane, ear canal and external ear normal.      Left Ear: Tympanic membrane, ear canal and external ear normal.      Nose: Nose normal.      Mouth/Throat:      Mouth: Mucous membranes are moist.      Pharynx: Oropharynx is clear.   Eyes:      Extraocular Movements: Extraocular movements intact.      Conjunctiva/sclera: Conjunctivae normal.      Pupils: Pupils are equal, round, and reactive to light.   Cardiovascular:      Rate and Rhythm: Normal rate and regular rhythm.      Pulses: Normal pulses.   Pulmonary:      Effort: Pulmonary effort is normal.      Breath sounds: Normal breath sounds.   Abdominal:      General: Abdomen is flat. Bowel sounds are normal.      Palpations: Abdomen is soft.   Musculoskeletal:         General: Normal range of motion.      Cervical back: Normal range of motion and neck supple.   Skin:     General: Skin is warm.   Neurological:      General: No focal deficit present.      Mental Status: He is alert and oriented to person, place, and time. Mental status is at baseline.   Psychiatric:          Mood and Affect: Mood normal.         Behavior: Behavior normal.         Thought Content: Thought content normal.         Judgment: Judgment normal.     Assessment/Plan   Problem List Items Addressed This Visit       Episode of dizziness - Primary    Relevant Medications    meclizine (Antivert) 25 mg tablet    Other Relevant Orders    Referral to Physical Therapy    MR cervical spine wo IV contrast    MR brain wo IV contrast    Patient to return to office in 4- 6 weeks for reassessment.

## 2024-03-28 ENCOUNTER — HOSPITAL ENCOUNTER (OUTPATIENT)
Dept: RADIOLOGY | Facility: CLINIC | Age: 79
Discharge: HOME | End: 2024-03-28
Payer: MEDICARE

## 2024-03-28 DIAGNOSIS — R42 EPISODE OF DIZZINESS: ICD-10-CM

## 2024-03-28 PROCEDURE — 72141 MRI NECK SPINE W/O DYE: CPT | Performed by: RADIOLOGY

## 2024-03-28 PROCEDURE — 70551 MRI BRAIN STEM W/O DYE: CPT | Performed by: RADIOLOGY

## 2024-03-28 PROCEDURE — 70551 MRI BRAIN STEM W/O DYE: CPT

## 2024-03-28 PROCEDURE — 72141 MRI NECK SPINE W/O DYE: CPT

## 2024-03-30 DIAGNOSIS — I10 PRIMARY HYPERTENSION: ICD-10-CM

## 2024-03-30 PROBLEM — M24.549 CONTRACTURE OF HAND: Status: ACTIVE | Noted: 2024-03-30

## 2024-04-04 ENCOUNTER — EVALUATION (OUTPATIENT)
Dept: PHYSICAL THERAPY | Facility: CLINIC | Age: 79
End: 2024-04-04
Payer: MEDICARE

## 2024-04-04 DIAGNOSIS — M54.12 CERVICAL RADICULOPATHY: ICD-10-CM

## 2024-04-04 PROCEDURE — 97162 PT EVAL MOD COMPLEX 30 MIN: CPT | Mod: GP

## 2024-04-04 ASSESSMENT — ENCOUNTER SYMPTOMS
OCCASIONAL FEELINGS OF UNSTEADINESS: 1
DEPRESSION: 0
LOSS OF SENSATION IN FEET: 1

## 2024-04-04 ASSESSMENT — PAIN SCALES - GENERAL: PAINLEVEL_OUTOF10: 3

## 2024-04-04 ASSESSMENT — PAIN - FUNCTIONAL ASSESSMENT: PAIN_FUNCTIONAL_ASSESSMENT: 0-10

## 2024-04-04 NOTE — PROGRESS NOTES
Physical Therapy    Physical Therapy Evaluation    Patient Name: Cristy Forte  MRN: [id number]  Today's Date: 4/4/2024  PT Evaluation Time Entry  PT Evaluation (Moderate) Time Entry: 45                     Visit #1  Insurance; Medicare  Cert; 4/4/2024- 5/31/2024  Assessment  Patient presents with severe loss of cervical spine motions in all planes, poor protruded head forward posture, chronic spasms related to hx of lower cervical spine fusion in 2012 and progressing symptoms of constant light headedness. Patient has constant tingling and burning in fingers, distal neuropathy without effecting management of pain intensity per patient report. Unclear of symptoms related to vestibular problems or cervicogenic issues. Suboccipital release attempted, however without change. Brie Angel [place] to left and right negative for BPPV. Patient referred to vestibular specialist for further assessment    Plan  Certification Period Start Date: 04/04/24  Certification Period End Date: 07/01/24  Rehab Potential: Fair  Plan of Care Agreement: Patient    Current Problem  1. Cervical radiculopathy  Referral to Physical Therapy          Subjective   General:  General  Reason for Referral: PT evaluation and treat; cervical radiculopathy  Referred By: Dr Muir  Past Medical History Relevant to Rehab: Hx of cervical radilopathy and dizziness  General Comment: Long hx of neck pain and numbness in hands/fingers prior and sense neck fusion surgery in 2012  Precautions:  Precautions  STEADI Fall Risk Score (The score of 4 or more indicates an increased risk of falling): 0  Vital Signs:     Pain:  Pain Assessment: 0-10  Pain Score: 3  Pain Location:  (fingers of both hands)  Pain Orientation:  (bilateral fingers)  Pain Descriptors:  (feels like nerve pain, like when you hit your funny bone in the elbow)  Pain Frequency:  (pain goes away, but soreness in hands and weakness does not go away)  Clinical Progression: Gradually  worsening  Effect of Pain on Daily Activities: I feel dizzy, off balance, my fingers and feet hurt (Got up out of bed 3 weeks ago and felt room spinning for about 5 minutes. Since then I have not felt it)  Patient's Stated Pain Goal:  (I hope to improve sense of light headedness which is there from the time I wake up until I go to bed)  Home Living: with family      Prior Function Per Pt/Caregiver Report: retired community ambulator who continues to be independent with daily functions while experiencing constant light headedness and unsteadiness along with distal neuropathy over 10 years.         Objective   Posture: poor forward head posture with hx of cervical spine fusion     Range of Motion: severe loss of cervical spine extension, lateral flexion, and bilateral rotations. Moderate loss of cervical flexion     Strength: NA     Flexibility: Tightness of cervical spine musculature induced by chronic spasms and C-spine fusion     Palpation: patient not complaining of aches or tenderness with palpation of cervical spine musculature      Special Tests: Negative Cervical compression and Decompression tests     Gait: slow gait speed or 1 m/s, no major other deviations  DGI = 17/24     Outcome Measures:  NDI = 12%     OP EDUCATION:  Outpatient Education  Individual(s) Educated: Patient  Education Provided: Body Mechanics, Home Exercise Program, POC, Posture  Risk and Benefits Discussed with Patient/Caregiver/Other: yes  Patient/Caregiver Demonstrated Understanding: yes  Plan of Care Discussed and Agreed Upon: yes  Patient Response to Education: Patient/Caregiver Verbalized Understanding of Information    Goals: Patient referred to vestibular specialist

## 2024-04-05 RX ORDER — POTASSIUM CHLORIDE 20 MEQ/1
20 TABLET, EXTENDED RELEASE ORAL DAILY
Qty: 90 TABLET | Refills: 1 | Status: SHIPPED | OUTPATIENT
Start: 2024-04-05

## 2024-04-12 ENCOUNTER — DOCUMENTATION (OUTPATIENT)
Dept: PHYSICAL THERAPY | Facility: CLINIC | Age: 79
End: 2024-04-12
Payer: MEDICARE

## 2024-04-12 NOTE — PROGRESS NOTES
Physical Therapy    Discharge Summary    Name: Cristy Forte  MRN: [id number]  : 1945  Date: 24    Discharge Summary: PT    Discharge Information: Date of discharge 2024, Date of last visit [id number], Date of evaluation 2024, Number of attended visits 1, Referred by Dr Muir, and Referred for neck pain and dizziness     Therapy Summary: Patient evaluated for neck pain. Patient denying pain, and only reporting constant since of light headedness and lack of stability. Patient referred to vestibular specialist     Discharge Status: NA     Rehab Discharge Reason: Patient referred to vestibular specialist

## 2024-04-15 DIAGNOSIS — Z79.4 TYPE 2 DIABETES MELLITUS WITH OTHER SPECIFIED COMPLICATION, WITH LONG-TERM CURRENT USE OF INSULIN (MULTI): Primary | ICD-10-CM

## 2024-04-15 DIAGNOSIS — E11.69 TYPE 2 DIABETES MELLITUS WITH OTHER SPECIFIED COMPLICATION, WITH LONG-TERM CURRENT USE OF INSULIN (MULTI): Primary | ICD-10-CM

## 2024-04-17 RX ORDER — METFORMIN HYDROCHLORIDE 750 MG/1
750 TABLET, EXTENDED RELEASE ORAL
Qty: 90 TABLET | Refills: 1 | Status: SHIPPED | OUTPATIENT
Start: 2024-04-17 | End: 2025-04-17

## 2024-04-19 ENCOUNTER — CLINICAL SUPPORT (OUTPATIENT)
Dept: PHYSICAL THERAPY | Facility: CLINIC | Age: 79
End: 2024-04-19
Payer: MEDICARE

## 2024-04-19 VITALS — HEART RATE: 79 BPM | DIASTOLIC BLOOD PRESSURE: 84 MMHG | SYSTOLIC BLOOD PRESSURE: 157 MMHG

## 2024-04-19 DIAGNOSIS — R26.89 IMBALANCE: Primary | ICD-10-CM

## 2024-04-19 DIAGNOSIS — M43.6 NECK STIFFNESS: ICD-10-CM

## 2024-04-19 DIAGNOSIS — R42 EPISODE OF DIZZINESS: Chronic | ICD-10-CM

## 2024-04-19 PROCEDURE — 97161 PT EVAL LOW COMPLEX 20 MIN: CPT | Mod: GP | Performed by: PHYSICAL THERAPIST

## 2024-04-19 PROCEDURE — 97112 NEUROMUSCULAR REEDUCATION: CPT | Mod: GP | Performed by: PHYSICAL THERAPIST

## 2024-04-19 PROCEDURE — 97140 MANUAL THERAPY 1/> REGIONS: CPT | Mod: GP | Performed by: PHYSICAL THERAPIST

## 2024-04-19 ASSESSMENT — PAIN - FUNCTIONAL ASSESSMENT: PAIN_FUNCTIONAL_ASSESSMENT: 0-10

## 2024-04-19 ASSESSMENT — ENCOUNTER SYMPTOMS
DEPRESSION: 0
OCCASIONAL FEELINGS OF UNSTEADINESS: 1
LOSS OF SENSATION IN FEET: 1

## 2024-04-19 ASSESSMENT — PAIN SCALES - GENERAL: PAINLEVEL_OUTOF10: 0 - NO PAIN

## 2024-04-19 NOTE — PROGRESS NOTES
Physical Therapy    Physical Therapy Evaluation and Treatment      Patient Name: Cristy Forte  MRN: [id number]  Today's Date: 4/19/2024  Visit number : 1  Medicare  [facility] end date: 7/18/24  Time Calculation  Start Time: 0836  Stop Time: 0930  Time Calculation (min): 54 min    Assessment:  Patient is a 78 year old male referred to [facility]'s outpatient physical therapy services with a chief complaint of lightheadedness. The patient's symptoms are constant in nature.  During today's vestibular evaluation the patient did not demonstrate any positive findings for orthostatic hypotension or peripheral vestibular involvement.  Pt. Tested negative during all canalith positional testing. The patient is presenting with significant cervical range of motion deficits with history of multilevel laminectomy and posterior fusion. Pt. Lightheadedness remained unchanged with manual therapy techniques this date. Pt. Also presents with report of imbalance during functional mobility.   Pt. Patient has report of somatosensory deficits in his feet and potential for vestibular dysfunction that are contributing to his symptoms. Pt. Was challenged with eyes closed on foam condition of MCTSIB indicating decreased utilization of vestibular cues for balance.  Mr. Forte will benefit from physical therapy services to address dizziness and imbalance and reduce fall risk in his home and the community.      PT Assessment  PT Assessment Results: Decreased range of motion, Decreased endurance, Impaired vision, Impaired hearing, Impaired sensation, Orthopedic restrictions, Pain  Rehab Prognosis: Fair     Plan: FGA, functional sit<>stand test, 6MWT, HEP    OP PT Plan  Certification Period Start Date: 04/19/24  Certification Period End Date: 07/18/24  Rehab Potential: Fair  Plan of Care Agreement: Patient    Current Problem:   1. Imbalance  Follow Up In Physical Therapy      2. Episode of dizziness  Referral to Physical Therapy    Follow  "Up In Physical Therapy      3. Neck stiffness            Subjective    Patient Report: Pt. Reports that he has lightheadedness that has been going on the past couple of years.  He feels it right now in his eyes and in his head, if he leans over and then stands up really quickly and sometimes he sees speckles.  Sometimes when he turns his head he gets those spots as well.   It gets better sometimes, its hard to say what makes it better. He feels off with his balance all the time, he can't walk in a straight.  He doesn't feel confident with his balance but hasn't had any falls.  He has reduced neck range of motion.  He denies room spinning dizziness, but this has happened in the past. He goes to the restroom but sometimes he is so off balance and dizzy but when he lays down the room spins, this happened a month ago.  He denies any headaches.      Current lightheadedness=10  N/T=tinging in both of his hands and feet    Home Setup:  Pt. Lives alone, a couple stairs to get into his home with one railing, one level home, has family in [facility] d[place] and grandchildren  Equipment:  no devices, has a walk in shower  Hobbies: used to work out in his yard but now has someone who helps him  Physical Activity: has weights and a treadmill   Occupation: retired,    Sleep: 6-7 hours a night  Hydration: drinks 3-12 ounce glasses  Falls: no falls   Pt. Goal: \"get back to normal\"  Precautions: aortic valve replacement in 2017/18, pror laminectomy with posterior fixation at multiple sites, A-Fib, DMII    HPI: Dr. Misa Frost MD office visit 3/21/24    \"Episode of dizziness - Primary     Relevant Medications     meclizine (Antivert) 25 mg tablet     Other Relevant Orders     Referral to Physical Therapy     MR cervical spine wo IV contrast     MR brain wo IV contrast    Patient to return to office in 4- 6 weeks for reassessment.\"    General  Reason for Referral: episode of dizziness  Referred By: Dr." "Wood  Preferred Learning Style: visual, kinesthetic  Precautions:  Precautions  STEADI Fall Risk Score (The score of 4 or more indicates an increased risk of falling): 4  Vital Signs:  Vital Signs  Heart Rate: 79  BP: 157/84  BP Location: Left arm  BP Method: Automatic  Patient Position: Standing  Pain:  Pain Assessment  Pain Assessment: 0-10  Pain Score: 0 - No pain         Objective   Cognition:  Vestibular assessment:  Falls= none  sensory deficits= tingling in hands and feet  visual deficits= has been prescribed glasses just doesn't wear them all the time, just when reading  hearing loss=none that he is aware of  cervical AROM:  Extension: 10'  Flexion:10'  R rotation=20'  L rotation=18'  Ocular ROM=WNL  Gaze evoked nystagmus (With fixation)=neg.  Gaze evoked nystagmus (Without fixation)=R gaze=R beating fast phase  Saccades=WNL  Smooth pursuits=WNL  Spontaneous nystagmus=neg.  Roll Test=neg.  [place] Hallpike test=neg.   Gait=head en block posturing, wide RENE, reciprocal pattern, normal speed    Modified Clinical Sensory Integration Test  Condition One: Eyes Open, Firm Surface  Total Time: __30_____ / 30 sec minimal sway   Condition Two: Eyes Closed, Firm Surface  Total Time: __30_____ / 30 sec mod sway  Condition Three: Eyes Open, Foam Surface  Total Time: __30_____ / 30 sec mild sway  Condition Four: Eyes Closed, Foam Surface  Total Time: __7,8,13 (av.=9 sec.)_____ / 30 sec LOB to the L side with min-A required    *due to time constraints further functional outcome measures will be performed in next 1-2 visits       Outcome Measures:  ABC=86%    Treatments:    Manual therapy:  Manual cervical distraction grade II 30\"x4  Suboccipital release x4 with 30\" hold   Myofascial release bilateral UT, levator, suboccipitals    Neuro Re-Ed:  Skilled vestibular specific education was provided to patient on three balance systems including vestibular system, visual system and balance systems and how an impairment of " "one or more of these systems can cause dizziness or imbalance.  Discussed how vestibular based therapy treatment can address these deficits.   Educated pt. On utilizing night lights at night to decrease fall risk provided printout    EDUCATION:  Outpatient Education  Individual(s) Educated: Patient  Education Provided: Body Mechanics, Anatomy, Home Exercise Program, Home Safety, POC, Physiology  Risk and Benefits Discussed with Patient/Caregiver/Other: yes  Patient/Caregiver Demonstrated Understanding: yes  Plan of Care Discussed and Agreed Upon: yes  Patient Response to Education: Patient/Caregiver Verbalized Understanding of Information    Goals:  Active       PT Problem       PT Goal 1       Start:  04/19/24    Expected End:  07/18/24       Pt. Will improve EC on foam condition of MCTSIB by end of POC to indicate improved utilization of vestibular cues for balance         PT Goal 2       Start:  04/19/24    Expected End:  07/18/24       Pt. Will complete further functional outcome measures including FGA, 6MWT and functional sit to stand test in next 1-2 visits         PT Goal 3       Start:  04/19/24    Expected End:  07/18/24       Pt. Will report reduced lightheadedness by end of POC to improve QOL         PT Goal 4       Start:  04/19/24    Expected End:  07/18/24       Pt. Will be independent in HEP in next 1-2 visits to promote self efficacy, manage symptoms and meet other LTG.          Pt. goal       Start:  04/19/24    Expected End:  07/18/24       \"Get back to normal, reduce lightheadedness\"                   "

## 2024-04-25 ENCOUNTER — TREATMENT (OUTPATIENT)
Dept: PHYSICAL THERAPY | Facility: CLINIC | Age: 79
End: 2024-04-25
Payer: MEDICARE

## 2024-04-25 DIAGNOSIS — R26.89 IMBALANCE: Primary | ICD-10-CM

## 2024-04-25 DIAGNOSIS — R42 EPISODE OF DIZZINESS: Chronic | ICD-10-CM

## 2024-04-25 PROCEDURE — 97112 NEUROMUSCULAR REEDUCATION: CPT | Mod: GP | Performed by: PHYSICAL THERAPIST

## 2024-04-25 PROCEDURE — 97110 THERAPEUTIC EXERCISES: CPT | Mod: GP | Performed by: PHYSICAL THERAPIST

## 2024-04-25 ASSESSMENT — PAIN - FUNCTIONAL ASSESSMENT: PAIN_FUNCTIONAL_ASSESSMENT: 0-10

## 2024-04-25 ASSESSMENT — PAIN SCALES - GENERAL: PAINLEVEL_OUTOF10: 0 - NO PAIN

## 2024-04-25 NOTE — PROGRESS NOTES
Physical Therapy    Physical Therapy Treatment    Patient Name: Cristy Forte  MRN: [id number]  Today's Date: 4/25/2024  Visit: 2  Medicare  POC end date: 7/18/24  Time Calculation  Start Time: 0950  Stop Time: 1035  Time Calculation (min): 45 min     PT Therapeutic Procedures Time Entry  Neuromuscular Re-Education Time Entry: 30  Therapeutic Exercise Time Entry: 15         Assessment:   Further outcome measures performed today including the 5x sit to stand, FGA, and 6MWT. The patient required 29 seconds to complete the 5x sit to stand test, demonstrating decreased functional LE strength and scoring below the fall risk cut off score of 15 seconds. The patient scored an 18/30 on the FGA which is below the fall risk cut off score of 23. The patient was most challenged with stepping over an obstacle, gait with narrow base of support, and gait with eyes closed. The patient ambulated a total of 944' during the 6MWT which is below his age related normative value of 1665' and also places him in the category of limited community ambulation. The patient was given a walking program to increase his tolerance to walking and improve his walking endurance. The patient also performed postural strengthening exercises to promote a more upright posture. The patient will benefit from PT to address his decreased dynamic balance and improve his overall balance confidence.    Plan:   Dynamic balance exercises    Current Problem  1. Imbalance  Follow Up In Physical Therapy      2. Episode of dizziness  Follow Up In Physical Therapy          General          Subjective    Patient reports that nothing is new. Patient reports that he feels lightheadedness constantly that worsens with head movements, especially vertical head movements.     Precautions   N/A  Vital Signs   Not taken  Pain  Pain Assessment  Pain Assessment: 0-10  Pain Score: 0 - No pain    Objective   Patient ambulates with no device at independent level    Outcome  Measures:  FGA - Functional Gait Assessment  Gait level surface: 2  Change in gait speed: 2  Gait with horizontal head turns: 3  Gait with vertical head turns: 3  Gait and pivot turn: 2  Step over obstacle: 0  Gait with narrow base of support: 0  Gait with eyes closed: 0  Ambulating backwards: 3  Steps: 3  FGA Total Score: 18    Other Measures  5x Sit to Stand: 29 seconds (no UE support)  6 min Walk Test: 944' (no device)  Other Outcome Measures: SLS: RLE= 2 seonds, LLE= 2 seconds    Treatments:  There ex:  -Sit to stands with no UE support x6  -Standing rows with BTB  -Standing pulldowns with BTB  -Provided patient with walking program    Neuro re-ed:  The following exercises were completed at supervision level over 20ft. distance  Gait level surface:  Change in gait speed:  Gait with horizontal head turns:  Gait with vertical head turns:  Gait with pivot turn:  Step over obstacle:  Gait with narrow RENE:  Gait with eyes closed:  Ambulating backwards:  Stairs 3-6 inch stairs   Educated pt. On fall risk cut off score.       OP EDUCATION:       DAWNA Ricks present and assisted with PT Evaluation & Treatment under the direct supervision of myself, the primary Physical Therapist.      Goals:  Active       PT Problem       PT Goal 1       Start:  04/19/24    Expected End:  07/18/24       Pt. Will improve EC on foam condition of MCTSIB by end of POC to indicate improved utilization of vestibular cues for balance         PT Goal 2       Start:  04/19/24    Expected End:  07/18/24       Pt. Will complete further functional outcome measures including FGA, 6MWT and functional sit to stand test in next 1-2 visits         PT Goal 3       Start:  04/19/24    Expected End:  07/18/24       Pt. Will report reduced lightheadedness by end of POC to improve QOL         PT Goal 4       Start:  04/19/24    Expected End:  07/18/24       Pt. Will be independent in HEP in next 1-2 visits to promote self efficacy, manage symptoms  "and meet other LTG.          Pt. goal       Start:  04/19/24    Expected End:  07/18/24       \"Get back to normal, reduce lightheadedness\"           "

## 2024-04-28 DIAGNOSIS — E78.00 PURE HYPERCHOLESTEROLEMIA: ICD-10-CM

## 2024-04-30 RX ORDER — EZETIMIBE 10 MG/1
10 TABLET ORAL DAILY
Qty: 90 TABLET | Refills: 1 | Status: SHIPPED | OUTPATIENT
Start: 2024-04-30

## 2024-05-03 ENCOUNTER — TREATMENT (OUTPATIENT)
Dept: PHYSICAL THERAPY | Facility: CLINIC | Age: 79
End: 2024-05-03
Payer: MEDICARE

## 2024-05-03 DIAGNOSIS — R26.89 IMBALANCE: Primary | ICD-10-CM

## 2024-05-03 DIAGNOSIS — R42 EPISODE OF DIZZINESS: Chronic | ICD-10-CM

## 2024-05-03 PROCEDURE — 97110 THERAPEUTIC EXERCISES: CPT | Mod: GP | Performed by: PHYSICAL THERAPIST

## 2024-05-03 PROCEDURE — 97112 NEUROMUSCULAR REEDUCATION: CPT | Mod: GP | Performed by: PHYSICAL THERAPIST

## 2024-05-03 ASSESSMENT — PAIN - FUNCTIONAL ASSESSMENT: PAIN_FUNCTIONAL_ASSESSMENT: 0-10

## 2024-05-03 ASSESSMENT — PAIN SCALES - GENERAL: PAINLEVEL_OUTOF10: 0 - NO PAIN

## 2024-05-03 NOTE — PROGRESS NOTES
Physical Therapy    Physical Therapy Treatment    Patient Name: Cristy Forte  MRN: [id number]  Today's Date: 5/3/2024  Visit: 3  Medicare  POC end date: 7/18/24  Time Calculation  Start Time: 0900  Stop Time: 0945  Time Calculation (min): 45 min     PT Therapeutic Procedures Time Entry  Neuromuscular Re-Education Time Entry: 30  Therapeutic Exercise Time Entry: 15         Assessment:   Patient performed static and dynamic balance exercises this session. Pt most challenged with eyes closed with head turns. Pt performed sit to stands on foam with no LOB and VC for reaching forward and standing all the way up with horizontal abduction. Patient performed dynamic balance exercises stepping over hurdles and on foam. Pt with only one LOB with gallo catch and corrected with positive stepping strategy. Patient was given printed HEP for corner balance exercises.     Plan:   Dynamic balance exercises    Current Problem  1. Imbalance  Follow Up In Physical Therapy      2. Episode of dizziness  Follow Up In Physical Therapy            General          Subjective    Patient reports that nothing is new. He did two days of the walking program. Has been doing a lot of yard work lately.    Precautions  Precautions  Precautions Comment: DMII  Vital Signs   Not taken  Pain  Pain Assessment  Pain Assessment: 0-10  Pain Score: 0 - No pain    Objective   Patient ambulates with no device at independent level    Outcome Measures:            Treatments:  Ther ex:  -Recumbent cycle bike BLE only lvl 2.5 8'x1 with RPM above 60 for improved cardiovascular endurance  -Sit to stands on foam with no UE support x5 with CGA -> SBA  -Sit to stands on foam with shoulder horizontal abduction with GTB x10 with SBA    Neuro re-ed:  -Each of the following exercises performed for 30 seconds in corner with chair in front:   FT EC firm surface    FT EC firm surface with horizontal head turns   FT EC firm surface with vertical head turns   Modified tandem  "EO on foam   Modified tandem EO on foam with horizontal head turns  Modified tandem EC on foam  -Step up/down on 4\" step with foam x6 each side CGA  -Obstacle course with x4 hurdles, XL airex, and 4\" step x2 laps forward, x2 laps lateral stepping with CGA      OP EDUCATION:       Nicolasa Sue, DAWNA present and assisted with PT Evaluation & Treatment under the direct supervision of myself, the primary Physical Therapist.      Goals:      "

## 2024-05-07 ENCOUNTER — TREATMENT (OUTPATIENT)
Dept: PHYSICAL THERAPY | Facility: CLINIC | Age: 79
End: 2024-05-07
Payer: MEDICARE

## 2024-05-07 DIAGNOSIS — R26.89 IMBALANCE: Primary | ICD-10-CM

## 2024-05-07 DIAGNOSIS — R42 EPISODE OF DIZZINESS: Chronic | ICD-10-CM

## 2024-05-07 PROCEDURE — 97110 THERAPEUTIC EXERCISES: CPT | Mod: GP | Performed by: PHYSICAL THERAPIST

## 2024-05-07 PROCEDURE — 97112 NEUROMUSCULAR REEDUCATION: CPT | Mod: GP | Performed by: PHYSICAL THERAPIST

## 2024-05-07 ASSESSMENT — PAIN SCALES - GENERAL: PAINLEVEL_OUTOF10: 0 - NO PAIN

## 2024-05-07 ASSESSMENT — PAIN - FUNCTIONAL ASSESSMENT: PAIN_FUNCTIONAL_ASSESSMENT: 0-10

## 2024-05-07 NOTE — PROGRESS NOTES
Physical Therapy    Physical Therapy Treatment    Patient Name: Cristy Forte  MRN: [id number]  Today's Date: 5/7/2024  Visit: 5  Medicare  POC end date: 7/18/24  Time Calculation  Start Time: 0934  Stop Time: 1015  Time Calculation (min): 41 min  PT Therapeutic Procedures Time Entry  Neuromuscular Re-Education Time Entry: 31  Therapeutic Exercise Time Entry: 10    Assessment:   Mr. Forte was challenged with sagittal plane ankle/hip strategies on wobble board today. Pt. Demonstrated a delay in posterior righting reactions especially.  Pt. Required verbal cues for increased anterior weight shift during sit to stands, however with practice the patient improved.  The patient demonstrated multiple gallo KD's and LOB during gallo negotiation, however pt. Demonstrated positive stepping strategies to maintain upright in lateral plane.     Plan:  Picking up objects off of ground     Primary dx.: imbalance  Current Problem  1. Imbalance  Follow Up In Physical Therapy      2. Episode of dizziness  Follow Up In Physical Therapy              Subjective    Patient reports that nothing is new. He did two days of the walking program. Has been doing a lot of yard work lately.  He feels off balance when he has to  items off of the ground    Precautions  Precautions  Precautions Comment: DMII  Vital Signs   Not taken  Pain  Pain Assessment  Pain Assessment: 0-10  Pain Score: 0 - No pain    Objective   Patient ambulates with no device at independent level       Treatments:  Ther ex:  -Recumbent cycle bike BLE only lvl 2.5 8'x1 with RPM above 60 for improved cardiovascular endurance    Neuro re-ed:  -Sit to stands on foam with no UE support x10 with weighted ball press up over head  -functional mobility with horizontal head turns 20'x2 with VC to keep FA  -functional mobility with vertical HT 20'x2  -wobble board F/B WS x20 with no UE support  -wobble board sagittal plane perturbations to work on A/P reactions (delay in  posterior direction)    -Each of the following exercises performed for 30 seconds in corner with chair in front:   FT EC foam surface   Modified tandem EO on foam   Modified tandem EO on foam with sustained HT L/R       OP EDUCATION:  Educated pt. On importance of HEP compliance including walking program and static balance  HEP:  Walking program  Corner balance MTS EO on foam and FT EC on firm       Goals:

## 2024-05-09 ENCOUNTER — TREATMENT (OUTPATIENT)
Dept: PHYSICAL THERAPY | Facility: CLINIC | Age: 79
End: 2024-05-09
Payer: MEDICARE

## 2024-05-09 DIAGNOSIS — R42 EPISODE OF DIZZINESS: Chronic | ICD-10-CM

## 2024-05-09 DIAGNOSIS — R26.89 IMBALANCE: ICD-10-CM

## 2024-05-09 PROCEDURE — 97112 NEUROMUSCULAR REEDUCATION: CPT | Mod: GP | Performed by: PHYSICAL THERAPIST

## 2024-05-09 PROCEDURE — 97110 THERAPEUTIC EXERCISES: CPT | Mod: GP | Performed by: PHYSICAL THERAPIST

## 2024-05-09 ASSESSMENT — PAIN - FUNCTIONAL ASSESSMENT: PAIN_FUNCTIONAL_ASSESSMENT: 0-10

## 2024-05-09 ASSESSMENT — PAIN SCALES - GENERAL: PAINLEVEL_OUTOF10: 0 - NO PAIN

## 2024-05-09 NOTE — PROGRESS NOTES
Physical Therapy    Physical Therapy Treatment    Patient Name: Cristy Forte  MRN: [id number]  Today's Date: 5/9/2024  Visit: 6  Medicare  POC end date: 7/18/24  Time Calculation  Start Time: 0935  Stop Time: 1015  Time Calculation (min): 40 min  PT Therapeutic Procedures Time Entry  Neuromuscular Re-Education Time Entry: 31  Therapeutic Exercise Time Entry: 8    Assessment:   Mr. Forte continues to demonstrate delayed righting reactions on wobble board, however pt. With positive stepping strategies over ground.  This may be due to fear of being on an elevated surface, which can cause reduced distal balance strategies and increase postural sway.  Plan on discontinuing wobbleboard and emphasizing balance on uneven surfaces overground.  Pt. Completing vestibular challenges outdoors on grass with no significant LOB, only mild veering of path noted.  Pt. Was significantly challenged with vestibular challenges on foam, likely due to somatosensory deficits associated with peripheral neuropathy, however with practice the patient improved.    Plan:  Promoting stepping strategies, balance on uneven surfaces     Primary dx.: imbalance  Current Problem  1. Episode of dizziness  Follow Up In Physical Therapy      2. Imbalance  Follow Up In Physical Therapy              Subjective    Patient reports that nothing is new. He has a hard time with focusing his eyes when he turns his head or picks something off of the ground    Precautions  Precautions  Precautions Comment: DMII  Vital Signs   Not taken  Pain  Pain Assessment  Pain Assessment: 0-10  Pain Score: 0 - No pain    Objective   Patient ambulates with no device at independent level       Treatments:  Ther ex:  -Recumbent cycle bike BLE only lvl 3, seat 10, 8'x1 with RPM above 60 for improved cardiovascular endurance    Neuro re-ed:  -picking up paper clips off of ground x2  -FA on foam picking up objects off of ground x4  -Sit to stands on foam with no UE support x10  with EC  -functional mobility with horizontal head turns 20'x2 with VC to keep FA  -wobble board F/B WS x20 with no UE support multiple instances of LOB F and B  -wobble board sagittal plane with verical HT  -hurdles with foam x1 laps with CGA  -hurdles with foam with horizontal HT x2 laps CGA  -hurdles with foam with vertical HT x2 laps with VC for wide RENE and eyes before head (multiple LOB with positive steppnig strategy)CGA  -outdoor grass functional mobility with head turns horizontal/vertical 20'x2 each SBA  -outdoor grass functional mobility backwards 20'x2 SBA      OP EDUCATION:  Educated pt. On importance of HEP compliance including walking program and static balance  HEP:  Walking program  Corner balance MTS EO on foam and FT EC on firm       Goals:

## 2024-05-14 ENCOUNTER — TREATMENT (OUTPATIENT)
Dept: PHYSICAL THERAPY | Facility: CLINIC | Age: 79
End: 2024-05-14
Payer: MEDICARE

## 2024-05-14 DIAGNOSIS — R26.89 IMBALANCE: ICD-10-CM

## 2024-05-14 DIAGNOSIS — R42 EPISODE OF DIZZINESS: Chronic | ICD-10-CM

## 2024-05-14 PROCEDURE — 97112 NEUROMUSCULAR REEDUCATION: CPT | Mod: GP | Performed by: PHYSICAL THERAPIST

## 2024-05-14 PROCEDURE — 97110 THERAPEUTIC EXERCISES: CPT | Mod: GP | Performed by: PHYSICAL THERAPIST

## 2024-05-14 ASSESSMENT — PAIN - FUNCTIONAL ASSESSMENT: PAIN_FUNCTIONAL_ASSESSMENT: 0-10

## 2024-05-14 ASSESSMENT — PAIN SCALES - GENERAL: PAINLEVEL_OUTOF10: 0 - NO PAIN

## 2024-05-14 NOTE — PROGRESS NOTES
Physical Therapy    Physical Therapy Treatment    Patient Name: Cristy Forte  MRN: [id number]  Today's Date: 5/14/2024  Visit: 7  Medicare  POC end date: 7/18/24          Assessment:   Initiated treadmill training today to improve pt. Cardiovascular endurance, functional mobility tolerance. Pt. Tolerated 1.6mph speed with 1.0% incline with mild fatigue in BLE's post activity. During treadmill functional mobility, pt. Required verbal cues for bilateral heel strike due to scuffing noted in BLE's.  Pt. Was significantly challenged with gallo negotiation with foam and vestibular challenges., therefore this exercise was modified today.  The patient presents with reduced ankle strategies due to peripheral neuropathy, therefore pt. Depends on stepping strategies to keep his balance. Pt. Was appropriately challenged with cone taps, encouraged pt. To practice balance exercises daily due to decreased compliance to static balance to date.     Plan:  River rocks, resisted walking     Primary dx.: imbalance  Current Problem  1. Episode of dizziness  Follow Up In Physical Therapy      2. Imbalance  Follow Up In Physical Therapy              Subjective    Patient reports that nothing is new. He walked a lot this weekend, his legs and back got tired when he was doing this. He has a hard time focusing with his eyes, he is having that problem right now.     Precautions  Precautions  Precautions Comment: DMII  Vital Signs   Not taken  Pain  Pain Assessment  Pain Assessment: 0-10  Pain Score: 0 - No pain    Objective   Patient ambulates with no device at independent level       Treatments:  Ther ex:  -Recumbent cycle bike BLE only lvl 3, seat 10, 6'x1 with RPM above 60 for improved cardiovascular endurance  -treadmill functional mobility BUE support 1.6mph 1.0% incline for 6'x1 with VC for inc. Step height  -functional mobility with trekking poles 225'x1    Neuro re-ed:    -hurdles with foam x2 laps with CGA  -hurdles with foam  "with horizontal HT x2 laps with multiple LOB with positive stepping strategies  -MTS on foam with horizontal HT   -MTS on BOSU EO 30\" each side  -MTS on BOSU with horizontal/vertical HT x10 each  -MTS on BOSU with EC 15 sec. Each side  -cone taps x10 each over midline        OP EDUCATION:  Educated pt. On importance of HEP compliance including walking program and static balance  HEP:  Walking program  Corner balance MTS EO on foam and FT EC on firm  Cone taps       Goals:      "

## 2024-05-15 ENCOUNTER — OFFICE VISIT (OUTPATIENT)
Dept: PRIMARY CARE | Facility: CLINIC | Age: 79
End: 2024-05-15
Payer: MEDICARE

## 2024-05-15 VITALS
DIASTOLIC BLOOD PRESSURE: 69 MMHG | SYSTOLIC BLOOD PRESSURE: 113 MMHG | BODY MASS INDEX: 33.15 KG/M2 | WEIGHT: 231 LBS | HEART RATE: 69 BPM | TEMPERATURE: 96.9 F

## 2024-05-15 DIAGNOSIS — M54.12 CERVICAL RADICULOPATHY: ICD-10-CM

## 2024-05-15 DIAGNOSIS — R42 EPISODE OF DIZZINESS: Primary | ICD-10-CM

## 2024-05-15 PROCEDURE — 3078F DIAST BP <80 MM HG: CPT | Performed by: FAMILY MEDICINE

## 2024-05-15 PROCEDURE — 1160F RVW MEDS BY RX/DR IN RCRD: CPT | Performed by: FAMILY MEDICINE

## 2024-05-15 PROCEDURE — 3074F SYST BP LT 130 MM HG: CPT | Performed by: FAMILY MEDICINE

## 2024-05-15 PROCEDURE — 99214 OFFICE O/P EST MOD 30 MIN: CPT | Performed by: FAMILY MEDICINE

## 2024-05-15 PROCEDURE — 1159F MED LIST DOCD IN RCRD: CPT | Performed by: FAMILY MEDICINE

## 2024-05-15 PROCEDURE — 1126F AMNT PAIN NOTED NONE PRSNT: CPT | Performed by: FAMILY MEDICINE

## 2024-05-15 PROCEDURE — 1036F TOBACCO NON-USER: CPT | Performed by: FAMILY MEDICINE

## 2024-05-15 ASSESSMENT — PAIN SCALES - GENERAL: PAINLEVEL: 0-NO PAIN

## 2024-05-15 NOTE — PROGRESS NOTES
Subjective   Patient ID: Britta Fan is a 78 y.o. male who presents for Follow-up (Discuss mri check up).  HPI    BRITTA FAN is a 78 year old Male, with a PMH of HTN, BPH, CAD s/p stenting, afib on xarelto, s/p AVR, right cervical radiculopathy,  lumbar radiculopathy, s/p Bilateral C7 pedicle screw fractures, provoked multiple pulmonary embolism s/p TKA 2010, GERD, ED, adrenal adenoma, here for:    1-  Follow-up for lightheadedness that started about approximately 3-4 months ago. It is getting worse.  Associated with some unsteadiness and vertigo, an episodic right ear tinnitus.  Caused by his cervical spine fusion. PT started recently and 4 sessions have been completed.     A review of system was completed.  All systems were reviewed and were normal, except for the ones that are listed in the HPI.    Objective   Physical Exam  Constitutional:       Appearance: Normal appearance.   HENT:      Head: Normocephalic and atraumatic.      Right Ear: Tympanic membrane, ear canal and external ear normal.      Left Ear: Tympanic membrane, ear canal and external ear normal.      Nose: Nose normal.      Mouth/Throat:      Mouth: Mucous membranes are moist.      Pharynx: Oropharynx is clear.   Eyes:      Extraocular Movements: Extraocular movements intact.      Conjunctiva/sclera: Conjunctivae normal.      Pupils: Pupils are equal, round, and reactive to light.   Cardiovascular:      Rate and Rhythm: Normal rate and regular rhythm.      Pulses: Normal pulses.   Pulmonary:      Effort: Pulmonary effort is normal.      Breath sounds: Normal breath sounds.   Abdominal:      General: Abdomen is flat. Bowel sounds are normal.      Palpations: Abdomen is soft.   Musculoskeletal:         General: Normal range of motion.      Cervical back: Normal range of motion and neck supple.   Skin:     General: Skin is warm.   Neurological:      General: No focal deficit present.      Mental Status: He is alert and oriented to  person, place, and time. Mental status is at baseline.   Psychiatric:         Mood and Affect: Mood normal.         Behavior: Behavior normal.         Thought Content: Thought content normal.         Judgment: Judgment normal.     Assessment/Plan   Problem List Items Addressed This Visit       Cervical radiculopathy     PT continued.  -Neurologist referral done.          Relevant Orders    Referral to Neurology    Episode of dizziness - Primary     -Meclizine and repositioning maneuvers PRN.          Relevant Orders    Referral to Neurology    Patient to return to office 4- 6 weeks follow-up recommended.

## 2024-05-16 ENCOUNTER — TREATMENT (OUTPATIENT)
Dept: PHYSICAL THERAPY | Facility: CLINIC | Age: 79
End: 2024-05-16
Payer: MEDICARE

## 2024-05-16 DIAGNOSIS — R26.89 IMBALANCE: Primary | ICD-10-CM

## 2024-05-16 DIAGNOSIS — R42 EPISODE OF DIZZINESS: Chronic | ICD-10-CM

## 2024-05-16 PROCEDURE — 97112 NEUROMUSCULAR REEDUCATION: CPT | Mod: GP | Performed by: PHYSICAL THERAPIST

## 2024-05-16 PROCEDURE — 97110 THERAPEUTIC EXERCISES: CPT | Mod: GP | Performed by: PHYSICAL THERAPIST

## 2024-05-16 ASSESSMENT — PAIN - FUNCTIONAL ASSESSMENT: PAIN_FUNCTIONAL_ASSESSMENT: 0-10

## 2024-05-16 ASSESSMENT — PAIN SCALES - GENERAL: PAINLEVEL_OUTOF10: 0 - NO PAIN

## 2024-05-16 NOTE — PROGRESS NOTES
Physical Therapy    Physical Therapy Treatment    Patient Name: Cristy Forte  MRN: [id number]  Today's Date: 5/16/2024  Visit: 8  Medicare  POC end date: 7/18/24  Time Calculation  Start Time: 0803  Stop Time: 0845  Time Calculation (min): 42 min  PT Therapeutic Procedures Time Entry  Neuromuscular Re-Education Time Entry: 34  Therapeutic Exercise Time Entry: 8    Assessment:   Pt. was challenged with blaze pod exercise promoting single limb stance on foam, pt. With multiple LOB with positive stepping strategies to recover. Pt. Was initially challenged with lunge steps on foam, however with practice pt. Demonstrated improved weight shift and postural control.  Pt. Required verbal cues for proper motor planning of foot placement and anterior WS on river rocks due to pt. With tendency to step with more weight bearing on his heels leading to posterior LOB.  Pt. Will benefit from 1-2 more treatment sessions before discharge.    Plan:  River rocks, resisted walking, forward/backwards  monster walks     Primary dx.: imbalance  Current Problem  1. Imbalance  Follow Up In Physical Therapy      2. Episode of dizziness  Follow Up In Physical Therapy                Subjective    Patient reports that nothing is new. He walked a lot this weekend, his legs and back got tired when he was doing this. He has a hard time focusing with his eyes, he is having that problem right now.     Precautions:   Precautions  Precautions Comment: afib, DMII  Vital Signs   Not taken  Pain  Pain Assessment  Pain Assessment: 0-10  Pain Score: 0 - No pain    Objective   Patient ambulates with no device at independent level       Treatments:  Ther ex:  -treadmill functional mobility BUE support 1.8mph 1.0% incline for 8'x1 with VC for inc. Step height    Neuro re-ed:  -blaze pods color catch 2 pods, red=right, blue=left, 1'bouts x2, FA on foam tapping pods with BLE's , pods on 6 inch step   1st bout=28 reps   2nd bout=30 reps     -foam with gallo  lunge step and opposite UE reaching forward direction tapping PT hand as target x10 on each side  -foam with gallo lateral lunge step and opposite crossover punch hitting pink SB x10 on each side with   -river rock negotiation reciprocal pattern x4 laps  -river rock negotiation reciprocal pattern with horizontal HT x2 laps  -river rock negotiation reciprocal pattern with vertical HT x2       OP EDUCATION:  Educated pt. On importance of HEP compliance including walking program and static balance  HEP:  Walking program  Corner balance MTS EO on foam and FT EC on firm  Cone taps       Goals:

## 2024-05-18 DIAGNOSIS — N40.1 BENIGN PROSTATIC HYPERPLASIA WITH LOWER URINARY TRACT SYMPTOMS, SYMPTOM DETAILS UNSPECIFIED: ICD-10-CM

## 2024-05-18 DIAGNOSIS — I10 PRIMARY HYPERTENSION: ICD-10-CM

## 2024-05-20 RX ORDER — LOSARTAN POTASSIUM 100 MG/1
100 TABLET ORAL DAILY
Qty: 90 TABLET | Refills: 1 | Status: SHIPPED | OUTPATIENT
Start: 2024-05-20

## 2024-05-20 RX ORDER — FINASTERIDE 5 MG/1
5 TABLET, FILM COATED ORAL DAILY
Qty: 90 TABLET | Refills: 1 | Status: SHIPPED | OUTPATIENT
Start: 2024-05-20

## 2024-05-21 ENCOUNTER — TREATMENT (OUTPATIENT)
Dept: PHYSICAL THERAPY | Facility: CLINIC | Age: 79
End: 2024-05-21
Payer: MEDICARE

## 2024-05-21 DIAGNOSIS — R26.89 IMBALANCE: Primary | ICD-10-CM

## 2024-05-21 DIAGNOSIS — R42 EPISODE OF DIZZINESS: ICD-10-CM

## 2024-05-21 PROCEDURE — 97110 THERAPEUTIC EXERCISES: CPT | Mod: GP | Performed by: PHYSICAL THERAPIST

## 2024-05-21 PROCEDURE — 97112 NEUROMUSCULAR REEDUCATION: CPT | Mod: GP | Performed by: PHYSICAL THERAPIST

## 2024-05-21 ASSESSMENT — PAIN SCALES - GENERAL: PAINLEVEL_OUTOF10: 10 - WORST POSSIBLE PAIN

## 2024-05-21 ASSESSMENT — PAIN - FUNCTIONAL ASSESSMENT: PAIN_FUNCTIONAL_ASSESSMENT: 0-10

## 2024-05-21 NOTE — PROGRESS NOTES
Physical Therapy    Physical Therapy Treatment    Patient Name: Cristy Forte  MRN: [id number]  Today's Date: 5/21/2024  Visit: 9  Medicare  POC end date: 7/18/24  Time Calculation  Start Time: 1100  Stop Time: 1144  Time Calculation (min): 44 min  PT Therapeutic Procedures Time Entry  Neuromuscular Re-Education Time Entry: 35  Therapeutic Exercise Time Entry: 9  Assessment:   Pt. Has made good progress wither interventions focused on promoting hip and compensatory stepping strategies and challenging somatosensory system.  The patient demonstrated good response to utilizing knee/hip flexion to compensate for poor ankle strategies due to peripheral neuropathy.  Pt. Required continued to require verbal cues on proper foot placement during river rock negotiation today.  The patient has demonstrated decreased compliance to HEP, verbally encouraged pt. On importance of compliance to maintain progress made in therapy thus far.  Pt. Will be ready for discharge next visit.    Plan:  Discharge/progress check    Primary dx.: imbalance  Current Problem  1. Imbalance  Follow Up In Physical Therapy      2. Episode of dizziness  Follow Up In Physical Therapy              Subjective    Patient reports that nothing is new.  He has done a little walking, not as much as he should have    Precautions:   Precautions  Precautions Comment: afib, DMII  Vital Signs   Not taken  Pain  Pain Assessment  Pain Assessment: 0-10  Pain Score: 10 - Worst possible pain  Pain Type: Neuropathic pain    Objective   Patient ambulates with no device at independent level       Treatments:  Ther ex:  -treadmill functional mobility BUE support 1.8mph 1.0% incline for 9'x1 with VC for inc. Step height    Neuro re-ed:  -blaze pods color catch 2 pods, red=right, blue=left, 1'bouts x2, FA on foam tapping pods with BLE's , pods on 6 inch step   1st bout=28 reps   2nd bout=30 reps     -foam with gallo lunge step and opposite UE reaching forward direction  "tapping blaze pods, color catch, red=right, blue=left, 1'x2 with CGA (multiple LOB with positive stepping strategies  -foam with agllo lateral lunge step and opposite UE crossover tapping blaze pod, 1 color, color catch, pods on mirrror  -gallo negotiation with foam 5-6 inch hurdles with step-to pattern x2 laps  -gallo negotiation with foam 5-6 inch hurdles with reciprocal pattern x6 (VC for slight knee bend)  -FA on incline 15\"x2, 20\"x1  -river rock negotiation reciprocal pattern with horizontal HT x4 laps      OP EDUCATION:  Educated pt. On importance of HEP compliance including walking program and static balance  HEP:  Walking program  Corner balance MTS EO on foam and FT EC on firm  Cone taps       Goals:      "

## 2024-05-23 ENCOUNTER — TREATMENT (OUTPATIENT)
Dept: PHYSICAL THERAPY | Facility: CLINIC | Age: 79
End: 2024-05-23
Payer: MEDICARE

## 2024-05-23 DIAGNOSIS — R42 EPISODE OF DIZZINESS: Chronic | ICD-10-CM

## 2024-05-23 DIAGNOSIS — R26.89 IMBALANCE: ICD-10-CM

## 2024-05-23 PROCEDURE — 97112 NEUROMUSCULAR REEDUCATION: CPT | Mod: GP,KX | Performed by: PHYSICAL THERAPIST

## 2024-05-23 PROCEDURE — 97530 THERAPEUTIC ACTIVITIES: CPT | Mod: GP,KX | Performed by: PHYSICAL THERAPIST

## 2024-05-23 ASSESSMENT — PAIN - FUNCTIONAL ASSESSMENT: PAIN_FUNCTIONAL_ASSESSMENT: 0-10

## 2024-05-23 ASSESSMENT — PAIN SCALES - GENERAL: PAINLEVEL_OUTOF10: 0 - NO PAIN

## 2024-05-23 NOTE — PROGRESS NOTES
Physical Therapy    Physical Therapy Treatment/Discharge    Patient Name: Cristy Forte  MRN: [id number]  Today's Date: 5/23/2024  Visit: 10  Medicare  POC end date: 7/18/24  Time Calculation  Start Time: 1016  Stop Time: 1100  Time Calculation (min): 44 min  PT Therapeutic Procedures Time Entry  Neuromuscular Re-Education Time Entry: 30  Therapeutic Activity Time Entry: 14    Assessment:   Progress check performed today to assess pt. Progress towards long term goals and determine need for continued physical therapy services.  Mr. Forte has made improvements in all functional outcome measures in balance, functional strength and gait speed. Pt. Improved his score on the FGA from an 18/30 to a 23/30 today and pt. Is now above fall risk category compared to normative values on the FGA. Pt. Improved in his balance with stepping over obstacles, gait with a narrow RENE and vestibular challenges with gait.  Mr. Forte also improved his balance with eyes closed conditions on foam, indicating improved utilization of vestibular cues for balance.  The patient also improved his strength on the functional sit to stand test improving by MCID of 2 points.  The patient improved his gait speed as evidenced on the 6MWT improving by 221 ft.  Which is above MDC for normative values for the general geriatric population which is 190 ft.  Mr. Forte has had decreased HEP compliance. Pt. Did not meet the goals that were established to reduce lightheadedness. However, following vestibular evaluation, it was determined that this lightheadedness was not a peripheral vestibular issue and is either centrally mediated for cervicogenic in nature. However, due to cervical fusion, the cervical component was not something that would be possible to address with any other physical therapy interventions besides the postural strengthening that he has already been prescribed in his previous Occupational Therapy POC.  Emphasized the importance of  continuing with his established HEP and walking program in order to maintain progress that he has gained in physical therapy.      Plan:  Discharge    Primary dx.: imbalance  Current Problem  1. Episode of dizziness  Follow Up In Physical Therapy      2. Imbalance  Follow Up In Physical Therapy              Subjective    Patient reports that nothing is new.  He has done a little walking, not as much as he should have    Precautions:   Precautions  Precautions Comment: afib, DMII  Vital Signs   Not taken  Pain  Pain Assessment  Pain Assessment: 0-10  Pain Score: 0 - No pain    Objective   Patient ambulates with no device at independent level  Modified Clinical Sensory Integration Test  Condition One: Eyes Open, Firm Surface  Total Time: __30_____ / 30 sec minimal sway   Condition Two: Eyes Closed, Firm Surface  Total Time: __30_____ / 30 sec mod sway  Condition Three: Eyes Open, Foam Surface  Total Time: __30_____ / 30 sec mild sway  Condition Four: Eyes Closed, Foam Surface  Total Time: __19, 19, 19 (av. 19 sec)____ / 30 sec mod sway    FGA - Functional Gait Assessment  5/23/24=23/30  IE=18/30     Other Measures  5x Sit to Stand:   5/23/24=17 seconds with no UE support   IE=29 seconds (no UE support)    6 min Walk Test:   5/23/24=1165  QG=134' (no device)    Other Outcome Measures: SLS:   5/23/24= RLE=2 sec., LLE=2 sec.  IE=RLE= 2 seonds, LLE= 2 seconds       Treatments:    There AcT:  Functional mobility 1165 ft. With no device  Sit<>stands x5 with no UE support    Neuro re-ed:  T-he following exercises were completed at supervision level over 20ft. distance  Gait level surface:  Change in gait speed:  Gait with horizontal head turns:  Gait with vertical head turns:  Gait with pivot turn:  Step over obstacle:  Gait with narrow RENE:  Gait with eyes closed:  Ambulating backwards:  Stairs 3-6 inch stairs   Educated pt. On fall risk cut off score.   -SLS each side x2 bouts each  -Modified Clinical Sensory Integration  Test  Condition One: Eyes Open, Firm Surface  Condition Two: Eyes Closed, Firm Surface  Condition Three: Eyes Open, Foam Surface  Condition Four: Eyes Closed, Foam Surface    OP EDUCATION:  Educated pt. On importance of HEP compliance including walking program and static balance  HEP:  Walking program  Corner balance MTS EO on foam and FT EC on firm  Cone taps       Goals:

## 2024-06-03 DIAGNOSIS — I26.99 MULTIPLE PULMONARY EMBOLI (MULTI): Primary | ICD-10-CM

## 2024-06-05 ENCOUNTER — OFFICE VISIT (OUTPATIENT)
Dept: PRIMARY CARE | Facility: CLINIC | Age: 79
End: 2024-06-05
Payer: MEDICARE

## 2024-06-05 VITALS
WEIGHT: 273 LBS | DIASTOLIC BLOOD PRESSURE: 68 MMHG | HEART RATE: 60 BPM | BODY MASS INDEX: 39.17 KG/M2 | SYSTOLIC BLOOD PRESSURE: 114 MMHG | TEMPERATURE: 96.8 F

## 2024-06-05 DIAGNOSIS — R42 EPISODE OF DIZZINESS: ICD-10-CM

## 2024-06-05 DIAGNOSIS — R29.898 UPPER EXTREMITY WEAKNESS: ICD-10-CM

## 2024-06-05 DIAGNOSIS — R26.89 IMBALANCE: Primary | ICD-10-CM

## 2024-06-05 DIAGNOSIS — R42 VERTIGO: ICD-10-CM

## 2024-06-05 DIAGNOSIS — M43.6 NECK STIFFNESS: ICD-10-CM

## 2024-06-05 PROCEDURE — 3074F SYST BP LT 130 MM HG: CPT | Performed by: FAMILY MEDICINE

## 2024-06-05 PROCEDURE — 99214 OFFICE O/P EST MOD 30 MIN: CPT | Performed by: FAMILY MEDICINE

## 2024-06-05 PROCEDURE — 1126F AMNT PAIN NOTED NONE PRSNT: CPT | Performed by: FAMILY MEDICINE

## 2024-06-05 PROCEDURE — 1159F MED LIST DOCD IN RCRD: CPT | Performed by: FAMILY MEDICINE

## 2024-06-05 PROCEDURE — 1036F TOBACCO NON-USER: CPT | Performed by: FAMILY MEDICINE

## 2024-06-05 PROCEDURE — 3078F DIAST BP <80 MM HG: CPT | Performed by: FAMILY MEDICINE

## 2024-06-05 ASSESSMENT — PAIN SCALES - GENERAL: PAINLEVEL: 0-NO PAIN

## 2024-06-05 NOTE — PROGRESS NOTES
Subjective   Patient ID: Britta Fan is a 78 y.o. male who presents for Follow-up (6 month follow up).  HPI  BRITTA FAN is a 78 year old Male, with a PMH of HTN, BPH, CAD s/p stenting, afib on xarelto, s/p AVR, right cervical radiculopathy,  lumbar radiculopathy, s/p Bilateral C7 pedicle screw fractures, provoked multiple pulmonary embolism s/p TKA 2010, GERD, ED, adrenal adenoma, here for a follow-up visit.   He completed PT but only partially improved his cervicalgia, balance and dizziness.     A review of system was completed.  All systems were reviewed and were normal, except for the ones that are listed in the HPI.    Objective   Physical Exam  Constitutional:       Appearance: Normal appearance.   HENT:      Head: Normocephalic and atraumatic.      Right Ear: Tympanic membrane, ear canal and external ear normal.      Left Ear: Tympanic membrane, ear canal and external ear normal.      Nose: Nose normal.      Mouth/Throat:      Mouth: Mucous membranes are moist.      Pharynx: Oropharynx is clear.   Eyes:      Extraocular Movements: Extraocular movements intact.      Conjunctiva/sclera: Conjunctivae normal.      Pupils: Pupils are equal, round, and reactive to light.   Cardiovascular:      Rate and Rhythm: Normal rate and regular rhythm.      Pulses: Normal pulses.   Pulmonary:      Effort: Pulmonary effort is normal.      Breath sounds: Normal breath sounds.   Abdominal:      General: Abdomen is flat. Bowel sounds are normal.      Palpations: Abdomen is soft.   Musculoskeletal:         General: Normal range of motion.      Cervical back: Normal range of motion and neck supple.   Skin:     General: Skin is warm.   Neurological:      General: No focal deficit present.      Mental Status: He is alert and oriented to person, place, and time. Mental status is at baseline.      Comments: Bilateral hand decreased  3+/3+   Psychiatric:         Mood and Affect: Mood normal.         Behavior: Behavior  normal.         Thought Content: Thought content normal.         Judgment: Judgment normal.     Assessment/Plan   Problem List Items Addressed This Visit       Vertigo    Relevant Orders    Referral to ENT    Upper extremity weakness    Relevant Orders    Referral to Occupational Therapy    Episode of dizziness     -Partially improving.  -Neurologist referral pending.  -Referral to PT renewed.          Relevant Orders    Referral to Physical Therapy    Referral to ENT    Imbalance - Primary     -Continue PT.          Relevant Orders    Referral to Physical Therapy    Referral to ENT    Neck stiffness    Relevant Orders    Referral to Occupational Therapy    Patient to return to office in 2- 4 weeks if needed.

## 2024-06-14 DIAGNOSIS — I25.83 CORONARY ATHEROSCLEROSIS DUE TO LIPID RICH PLAQUE: ICD-10-CM

## 2024-06-14 DIAGNOSIS — I10 PRIMARY HYPERTENSION: ICD-10-CM

## 2024-06-14 DIAGNOSIS — I25.10 CORONARY ATHEROSCLEROSIS DUE TO LIPID RICH PLAQUE: ICD-10-CM

## 2024-06-17 DIAGNOSIS — K21.9 GERD WITHOUT ESOPHAGITIS: ICD-10-CM

## 2024-06-19 RX ORDER — AMLODIPINE BESYLATE 5 MG/1
5 TABLET ORAL DAILY
Qty: 90 TABLET | Refills: 1 | Status: SHIPPED | OUTPATIENT
Start: 2024-06-19

## 2024-06-19 RX ORDER — ATORVASTATIN CALCIUM 80 MG/1
80 TABLET, FILM COATED ORAL DAILY
Qty: 90 TABLET | Refills: 1 | Status: SHIPPED | OUTPATIENT
Start: 2024-06-19

## 2024-06-19 RX ORDER — SPIRONOLACTONE 25 MG/1
25 TABLET ORAL DAILY
Qty: 90 TABLET | Refills: 1 | Status: SHIPPED | OUTPATIENT
Start: 2024-06-19

## 2024-06-19 RX ORDER — CHLORTHALIDONE 25 MG/1
25 TABLET ORAL DAILY
Qty: 90 TABLET | Refills: 1 | Status: SHIPPED | OUTPATIENT
Start: 2024-06-19

## 2024-06-20 RX ORDER — PANTOPRAZOLE SODIUM 40 MG/1
TABLET, DELAYED RELEASE ORAL
Qty: 90 TABLET | Refills: 1 | Status: SHIPPED | OUTPATIENT
Start: 2024-06-20

## 2024-06-21 ENCOUNTER — OFFICE VISIT (OUTPATIENT)
Dept: NEUROLOGY | Facility: CLINIC | Age: 79
End: 2024-06-21
Payer: MEDICARE

## 2024-06-21 VITALS
DIASTOLIC BLOOD PRESSURE: 73 MMHG | HEART RATE: 74 BPM | HEIGHT: 71 IN | BODY MASS INDEX: 32.62 KG/M2 | WEIGHT: 233 LBS | SYSTOLIC BLOOD PRESSURE: 121 MMHG

## 2024-06-21 DIAGNOSIS — I69.393 ATAXIA, POST-STROKE: Primary | ICD-10-CM

## 2024-06-21 DIAGNOSIS — G56.03 BILATERAL CARPAL TUNNEL SYNDROME: ICD-10-CM

## 2024-06-21 DIAGNOSIS — R42 EPISODE OF DIZZINESS: ICD-10-CM

## 2024-06-21 DIAGNOSIS — M48.062 SPINAL STENOSIS OF LUMBAR REGION WITH NEUROGENIC CLAUDICATION: ICD-10-CM

## 2024-06-21 PROCEDURE — 1036F TOBACCO NON-USER: CPT | Performed by: PSYCHIATRY & NEUROLOGY

## 2024-06-21 PROCEDURE — 99215 OFFICE O/P EST HI 40 MIN: CPT | Performed by: PSYCHIATRY & NEUROLOGY

## 2024-06-21 PROCEDURE — 1159F MED LIST DOCD IN RCRD: CPT | Performed by: PSYCHIATRY & NEUROLOGY

## 2024-06-21 PROCEDURE — 3078F DIAST BP <80 MM HG: CPT | Performed by: PSYCHIATRY & NEUROLOGY

## 2024-06-21 PROCEDURE — 3074F SYST BP LT 130 MM HG: CPT | Performed by: PSYCHIATRY & NEUROLOGY

## 2024-06-21 PROCEDURE — 1160F RVW MEDS BY RX/DR IN RCRD: CPT | Performed by: PSYCHIATRY & NEUROLOGY

## 2024-06-21 NOTE — PROGRESS NOTES
"   [facility]   Cristy Forte is a 78 y.o. year old male presenting for neurologic evaluation.   PCP Misa Muir MD    2020- Admitted with diplopia.  MRI- L midbrain lacunar infarct.  Rx DAPT.   Readmitted with worsening visual focusing with residual gait instability.  MRI- 6mm L mesial cerebellar infarct with resolving prior brainstem infarct. Continued DAPT.   3/2022- Neurology ([facility])- presented with 6wk of dizziness and worsening of residual gait instability without improvement.  MRI punctate infarcts in posterior circulation.  MRA head/neck- dominant RV, no significant stenoses or LVO. Was in the process of being transitioned from plavix to aspirin for hernia surgery at the time, after MRI was switched from antiplatelet monotherapy to plavix + xarelto.     6/21/24- Consult for dizziness for several months.  Saw PCP, Ct/ MRI- no new changes, EKG- NSR.    Today:  main complaint is feeling dizzy \"I feel high\" and off balance, has to be very careful, does not use a device and no falls.  This never resolved from prior stroke, no new events.  PCP sent to PT and wants to go back, will schedule.   Nocturnal positional paresthesias RUE>LUE resolve with shaking/ rubbing interferes with sleep- had LCTS, told he needed on R but didn't pursue. Not interested in surgery, would consider a splint though.   Chronic back pain since 2010, sometimes goes into RLE with prolonged standing > walking.  MRI 2014- LCS L4-5 primarily, told he should have surgery but didn't pursue. Not interested in any spine surgery.   Vascular risk factors- CAD s/p OSH DM HTN, HPL    Extensive review of notes in EMR, labs, tests, Interpretation of neuroimaging  CT head wo IV contrast    Result Date: 3/19/2024  No acute intracranial hemorrhage, mass effect or midline shift.   Nonspecific scattered white matter hypodensities favored to represent sequela of small vessel ischemia.   Postsurgical and " degenerative changes throughout the cervical spine as above. No acute fracture or traumatic malalignment.     MACRO: None.   Signed by: Evan Finkelstein 3/19/2024 12:48 AM Dictation workstation:   [id number]   No MRI head results found for the past 12 months  Encounter Date: 03/18/24   ECG 12 lead   Result Value    Ventricular Rate 79    Atrial Rate 79    NM Interval 170    QRS Duration 82    QT Interval 412    QTC Calculation(Bazett) 472    P Axis 48    R Axis -6    T Axis 55    QRS Count 13    Q Onset 222    P Onset 137    P Offset 194    T Offset 428    QTC Fredericia 451     No echocardiogram results found for the past 12 months     Lab Results   Component Value Date    CHOL 111 10/11/2023    TRIG 88 10/11/2023    HDL 44.7 10/11/2023    CHHDL 2.5 10/11/2023    LDLF 42 04/21/2023    VLDL 18 10/11/2023    NHDL 66 10/11/2023     Lab Results   Component Value Date    BNP 64 03/18/2024    HGBA1C 6.3 (H) 10/11/2023     Lab Results   Component Value Date    GLUCOSE 88 03/18/2024     03/18/2024    K 3.8 03/18/2024     03/18/2024    CO2 26 03/18/2024    ANIONGAP 12 03/18/2024    BUN 21 03/18/2024    CREATININE 0.95 03/18/2024    GFRMALE 68 04/21/2023    CALCIUM 9.3 03/18/2024    PHOS 3.4 11/21/2022    ALBUMIN 4.1 03/18/2024     Lab Results   Component Value Date    CALCIUM 9.3 03/18/2024    PHOS 3.4 11/21/2022    PROT 7.2 03/18/2024    ALBUMIN 4.1 03/18/2024    AST 24 03/18/2024    ALT 26 03/18/2024    ALKPHOS 63 03/18/2024    BILITOT 0.4 03/18/2024     Lab Results   Component Value Date    WBC 6.0 03/18/2024    HGB 13.8 03/18/2024    HCT 43.4 03/18/2024    MCV 82 03/18/2024     03/18/2024     INR   Date Value Ref Range Status   10/30/2022 2.0 (H) 0.9 - 1.1 Final     Lab Results   Component Value Date    TSH 2.01 03/18/2024       Relevant ROS, Problem list, Past Medical/ Surgical/ Family/ Social history- reviewed and pertinent details noted in history.     Objective     Visit Vitals  /73  "  Pulse 74   Ht 1.791 m (5' 10.5\")   Wt 106 kg (233 lb)   BMI 32.96 kg/m²   Smoking Status Former   BSA 2.3 m²     Neuro- good historian, normal speech and language. EOM full without nystagmus. Motor UE 5/5 without dystaxia.  + Tinel's L, R with decreased  bilaterally.  Ambulates independently good stride, pivot turns, negative romberg \"but doesn't feel right... head swimming\".  Mildly flexed, states if he stood longer he would get pain down RLE not present now, no focal LE weakness.     Assessment/Plan   1. Ataxia, post-stroke    2. Episode of dizziness    3. Spinal stenosis of lumbar region with neurogenic claudication    4. Bilateral carpal tunnel syndrome      PLAN   Discussed dizziness is residual post-stroke.  Agree with PT to work to manage these symptoms and remain safe preventing falls.   Hand numbness at night is likely the carpal tunnel syndrome- get EMG to assess, see if some noninvasive treatment- injection? Possible vs splinting.   Agree back/ leg symptoms are spinal stenosis documented on imaging 10 years ago.  Often this needs surgery but if that is not your wishes, would work with PT consider pain management referral for other conservative approaches to improving symptoms.     Goals for STROKE PREVENTION- recommendations to reduce the risk of vascular events and promote brain health include monitoring and management of vascular risk factors and lifestyle choices to goal values.     Blood pressure- Normal BP is <120/80 mmHg.    Cholesterol- Ideal lipid profile is an LDL-cholesterol <70 mg/dl, total cholesterol <200 mg/dl, fasting triglycerides < 150 mg/dl and HDL-cholesterol >55 mg/dl.   Blood sugar- Blood Sugar : Goal is normal fasting sugar between  mg/dl   Healthy physical activity- Goal is a moderate level of exercise at least 30 minutes/day, most days of the week.   Healthy weight- Goal is an ideal weight with a waistline <40\" for men or <35\" for women, and BMI of 18.5-25.   Healthy " diet- is rich in vegetables, fruits, whole grains, legumes and fish, low in salt, and avoids red meats and processed/ refined foods.   Healthy sleep- is restorative, ~7 hours/night, with identification and treatment of obstructive/ central sleep apnea that increases the risk of stroke and heart disease.   Smoking- Goal is to stop smoking any tobacco product and avoid second-hand smoke. For help to quit all forms of tobacco, call 5-060-QUIT-NOW (1-748.302.3933) to speak with a specialist at the [place] [facility].    Alcohol- Goal is moderation; no more than 2 servings for men and 1 serving for non-pregnant women.   Drug use- Goal is avoidance of illicit drugs that can cause blood pressure spikes and damage to blood vessels.   Stroke Warning Signs- know the symptoms of stroke and the importance of calling 911/EMS to access the quickest treatment.     No orders of the defined types were placed in this encounter.

## 2024-07-08 NOTE — PROGRESS NOTES
"AUDIOLOGY ADULT AUDIOMETRIC EVALUATION      Name:  Cristy Forte   :  1945  Age:  78 y.o.  Date of Evaluation:  2024    Time: 5761-8798    IMPRESSIONS     Right Ear: Hearing levels within normal limits 125-2000 Hz falling to mild sensorineural hearing loss 4287-4711 Hz.  Left Ear: Hearing levels within normal limits 125-1500 Hz falling to mild sensorineural hearing loss 9698-9665 Hz recovering to within normal limits at 4000 Hz falling to mild to moderate at 7965-2309 Hz.  Word understanding in quiet is excellent bilaterally.  Tympanometry indicates normal middle ear pressure and tympanic membrane mobility in both ears.     Amplification needs: Consider amplification if hearing loss begins affecting communication.    RECOMMENDATIONS     Continue medical follow up with Ears Nose and Throat (ENT) provider as recommended.  Return for audiologic evaluation annually to monitor hearing sensitivity and assess middle ear status or sooner should concerns arise. The audiology department can be reached at [phone] to schedule an appointment.     HISTORY     Reason for visit:  Mr. Forte is seen today for an initial audiologic evaluation in conjunction with an evaluation with Jenna James CNP due to dizziness. Mr. Forte had a left midbrain lacunar infarct and left mesial cerebellar infarct in 2020.  He reports feeling groggy/hung over. He reports this has been going on for over 1 year. He does endorse tinnitus every once in a while. History obtained from patient report and chart review.       EVALUATION     See scanned audiogram in \"Media\"     TEST RESULTS     Otoscopic Evaluation:  Right Ear: Cerumen which appeared to be occluding; tympanic membrane could not be visualized. Testing continued given tympanometry results.  Left Ear: Non-occluding cerumen.    Tympanometry (226 Hz):  Right Ear: Type A, middle ear pressure and tympanic membrane compliance within normal limits.   Left Ear: Type A, middle " ear pressure and tympanic membrane compliance within normal limits.     Acoustic Reflexes:   Right Ear: Responses present at 500-4000 Hz.  Left Ear: Responses present at 500-4000 Hz.    Distortion Product Otoacoustic Emissions:  Right Ear: Did not test.  Left Ear:  Did not test.  Present OAEs suggest normal or near cochlear outer hair cell function for corresponding frequency region(s). Absent OAEs with normal middle ear function can be consistent with some degree of hearing loss. Assessment of cochlear outer hair cell function may be impacted by outer or middle ear function.    Test technique:  Pure Tone Audiometry via headphones  Reliability: Good    Pure Tone Audiometry:    Right Ear: Hearing levels within normal limits 125-2000 Hz falling to mild sensorineural hearing loss 8143-2135 Hz.  Left Ear: Hearing levels within normal limits 125-1500 Hz falling to mild sensorineural hearing loss 3558-5117 Hz recovering to within normal limits at 4000 Hz falling to mild to moderate at 4337-7656 Hz.  **Asymmetry present at 4000 Hz, left ear better and at 8000 Hz right ear better.    Speech Audiometry:   Right Ear:  Speech Reception Threshold (SRT) was obtained at 5 dB HL. Word Recognition scores were excellent (100%) in quiet when words were presented at 50 dB HL. These results are based on [facility] Auditory Test No.6 (NU-6) (N=25).   Left Ear:  Speech Reception Threshold (SRT) was obtained at 5 dB HL. Word Recognition scores were excellent (100%) in quiet when words were presented at 50 dB HL. These results are based on [facility] Auditory Test No.6 (NU-6) (N=25).     Comparison of today's results with previous test results: No previous results available.         PATIENT EDUCATION     Discussed results and recommendations with Mr. Forte. Questions were addressed and the patient was encouraged to contact our department at [phone] should concerns arise.       Kadie Dorantes,  CCC-A  Clinical Audiologist    Degree of   Hearing Sensitivity dB Range   Within Normal Limits (WNL) 0 - 20   Slight 25   Mild 26 - 40   Moderate 41 - 55   Moderately-Severe 56 - 70   Severe 71 - 90   Profound 91 +     Key   CHL Conductive Hearing Loss   ECV Ear Canal Volume   HA Hearing Aid   NIHL Noise-Induced Hearing Loss   PTA Pure Tone Average   SNHL Sensorineural Hearing Loss   TM Tympanic Membrane   WNL Within Normal Limits

## 2024-07-09 ENCOUNTER — CLINICAL SUPPORT (OUTPATIENT)
Dept: AUDIOLOGY | Facility: CLINIC | Age: 79
End: 2024-07-09
Payer: MEDICARE

## 2024-07-09 ENCOUNTER — APPOINTMENT (OUTPATIENT)
Dept: OTOLARYNGOLOGY | Facility: CLINIC | Age: 79
End: 2024-07-09
Payer: MEDICARE

## 2024-07-09 VITALS — HEIGHT: 71 IN | TEMPERATURE: 96.8 F | BODY MASS INDEX: 32.41 KG/M2 | WEIGHT: 231.5 LBS

## 2024-07-09 DIAGNOSIS — H90.3 SENSORINEURAL HEARING LOSS (SNHL) OF BOTH EARS: ICD-10-CM

## 2024-07-09 DIAGNOSIS — R42 VERTIGO: ICD-10-CM

## 2024-07-09 DIAGNOSIS — R26.89 IMBALANCE: ICD-10-CM

## 2024-07-09 DIAGNOSIS — H93.13 TINNITUS OF BOTH EARS: ICD-10-CM

## 2024-07-09 DIAGNOSIS — R42 EPISODE OF DIZZINESS: ICD-10-CM

## 2024-07-09 DIAGNOSIS — R42 DIZZINESS: Primary | ICD-10-CM

## 2024-07-09 DIAGNOSIS — H90.3 SENSORINEURAL HEARING LOSS (SNHL) OF BOTH EARS: Primary | ICD-10-CM

## 2024-07-09 PROCEDURE — 99204 OFFICE O/P NEW MOD 45 MIN: CPT | Performed by: NURSE PRACTITIONER

## 2024-07-09 PROCEDURE — 1160F RVW MEDS BY RX/DR IN RCRD: CPT | Performed by: NURSE PRACTITIONER

## 2024-07-09 PROCEDURE — 1036F TOBACCO NON-USER: CPT | Performed by: NURSE PRACTITIONER

## 2024-07-09 PROCEDURE — 1159F MED LIST DOCD IN RCRD: CPT | Performed by: NURSE PRACTITIONER

## 2024-07-09 PROCEDURE — 92550 TYMPANOMETRY & REFLEX THRESH: CPT | Performed by: AUDIOLOGIST

## 2024-07-09 PROCEDURE — 92557 COMPREHENSIVE HEARING TEST: CPT | Performed by: AUDIOLOGIST

## 2024-07-09 ASSESSMENT — PATIENT HEALTH QUESTIONNAIRE - PHQ9
2. FEELING DOWN, DEPRESSED OR HOPELESS: NOT AT ALL
SUM OF ALL RESPONSES TO PHQ9 QUESTIONS 1 AND 2: 0
1. LITTLE INTEREST OR PLEASURE IN DOING THINGS: NOT AT ALL

## 2024-07-09 NOTE — PROGRESS NOTES
Subjective   Patient ID: Cristy Forte is a 78 y.o. male who presents for Vertigo and Cerumen Impaction.  HPI  This patient is referred for evaluation of constant non-vertiginous dizziness. The patient is not accompanied by anyone. The approximate duration of his complaints is greater than 1 year.  He first noticed issues after having a stroke.  The patient describes his dizziness as feeling like he is intoxicated or off balance all the time.  He also reports occasional episodes of vertigo which can increase in severity with slight turning of his neck.  When asked about ear pain, headache, phono-photophobia, visual or motion intolerance, sound or pressure induced symptoms, hearing loss, discharge from ear, tinnitus, aural fullness or autophony, the patient admits to mild photosensitivity, intermittent bilateral tinnitus.  He reports that his tinnitus only occurs in 1 year at a time.  When asked about a significant past otological history including history of prior ear surgery, noise exposure, exposure to ototoxic drugs or agents, and/or family history of hearing loss, the patient admits to occupational noise exposure as a teenager.    Patient has been evaluated by vestibular therapy.  Exam findings were concerning for a central versus cervicogenic origin of symptoms and negative for peripheral vestibular disorder.    Review of Systems  A comprehensive or 10 points review of the patient´s constitutional, neurological, HEENT, pulmonary, cardiovascular and genito-urinary systems showed only those mentioned in history of present illness.    Objective   Physical Exam  Constitutional: no fever, chills, weight loss or weight gain   General appearance: Appears well, well-nourished, well groomed. No acute distress.   Communication: Normal communication   Psychiatric: Oriented to person, place and time. Normal mood and affect.   Neurologic: Cranial nerves II-XII grossly intact and symmetric bilaterally.   Head and Face:  "  Head: Atraumatic with no masses, lesions or scarring.   Face: Normal symmetry, no paralysis, synkinesis or facial tic. No scars or deformities.   TMJ: Normal, no trismus.   Eyes: Conjunctiva not edematous or erythematous   Ears: External inspection of ears with no deformity, scars or masses. Bilateral EACs clear and bilateral TMs intact with no signs of effusions   Nose: External inspection of nose: No nasal lesions, lacerations or scars. Neck: Normal appearing, symmetric, trachea midline.   Cardiovascular: Examination of peripheral vascular system shows no clubbing or cyanosis.   Respiratory: No respiratory distress increased work of breathing. Inspection of the chest with symmetric chest expansion and normal respiratory effort.   Skin: No rashes in the head or neck  Bedside occulomotor function assessment for ocular pursuits and saccades, spontaneous nystagmus was normal.  Bilateral head thrust essentially normal but there is significantly decreased range of motion in both directions  Romberg unsteady, patient swaying front to back  Fukuda normal  Tandem gait-patient declined states \"I cannot do that.\"    My interpretation of the audiogram done today is normal hearing through 2000 Hz sloping to mild/moderate sensorineural hearing loss bilaterally.  There is right greater than left sensorineural asymmetry at 4000 Hz and left greater than right sensorineural asymmetry at 8000 Hz.  Excellent word recognition scores and normal tympanograms bilaterally.      MRI brain without contrast completed 3/28/2024 reports scattered white matter changes and no acute findings.  This was done without contrast due to allergy.    Assessment/Plan     This patient presents for initial evaluation of chronic acquired dizziness/vertigo, bilateral sensorineural hearing loss and tinnitus.    Reassurance given that otologic exam today is normal.  Audiogram was reviewed in detail.  He is not yet a candidate for hearing amplification.  We " discussed that there is asymmetry in 2 pitches, but given his contrast allergy, he is not a candidate for MRI IAC with and without contrast.  The physiology of balance control was explained. The likely possible etiologies were reviewed. I believe the patient does not likely have a peripheral vestibular disorder. I recommended further evaluation with VNG testing.  Pending those results, I would consider referral to neurosurgery to see if there are any other treatments for his cervical spine issues.  Patient is in agreement with the plan.  All questions were answered to patient's satisfaction.    This note was created using speech recognition transcription software. Despite proofreading, several typographical errors might be present that might affect the meaning of the content. Please call with any questions.           KAMI Avery-CNP 07/09/24 4:35 PM

## 2024-07-15 ENCOUNTER — APPOINTMENT (OUTPATIENT)
Dept: PRIMARY CARE | Facility: CLINIC | Age: 79
End: 2024-07-15
Payer: MEDICARE

## 2024-07-15 VITALS
TEMPERATURE: 98 F | HEART RATE: 67 BPM | WEIGHT: 234 LBS | BODY MASS INDEX: 33.1 KG/M2 | SYSTOLIC BLOOD PRESSURE: 97 MMHG | DIASTOLIC BLOOD PRESSURE: 61 MMHG

## 2024-07-15 DIAGNOSIS — I10 PRIMARY HYPERTENSION: ICD-10-CM

## 2024-07-15 DIAGNOSIS — R42 LIGHT HEADEDNESS: Primary | ICD-10-CM

## 2024-07-15 PROCEDURE — 1036F TOBACCO NON-USER: CPT | Performed by: FAMILY MEDICINE

## 2024-07-15 PROCEDURE — 3074F SYST BP LT 130 MM HG: CPT | Performed by: FAMILY MEDICINE

## 2024-07-15 PROCEDURE — 1159F MED LIST DOCD IN RCRD: CPT | Performed by: FAMILY MEDICINE

## 2024-07-15 PROCEDURE — 3078F DIAST BP <80 MM HG: CPT | Performed by: FAMILY MEDICINE

## 2024-07-15 PROCEDURE — 99214 OFFICE O/P EST MOD 30 MIN: CPT | Performed by: FAMILY MEDICINE

## 2024-07-15 RX ORDER — AMLODIPINE BESYLATE 5 MG/1
2.5 TABLET ORAL DAILY
Qty: 90 TABLET | Refills: 1 | Status: SHIPPED | OUTPATIENT
Start: 2024-07-15

## 2024-07-15 NOTE — PROGRESS NOTES
Subjective   Patient ID: Britta Fan is a 78 y.o. male who presents for Follow-up.  HPI  BRITTA FAN is a 78 year old Male, with a PMH of HTN, BPH, CAD s/p stenting, afib on xarelto, s/p AVR, right cervical radiculopathy,  lumbar radiculopathy, s/p Bilateral C7 pedicle screw fractures, provoked multiple pulmonary embolism s/p TKA 2010, GERD, ED, adrenal adenoma, here for a follow-up visit about his dizziness.  His BP today is 97/61.    A review of system was completed.  All systems were reviewed and were normal, except for the ones that are listed in the HPI.    Objective   Physical Exam  Constitutional:       Appearance: Normal appearance.   HENT:      Head: Normocephalic and atraumatic.      Right Ear: Tympanic membrane, ear canal and external ear normal.      Left Ear: Tympanic membrane, ear canal and external ear normal.      Nose: Nose normal.      Mouth/Throat:      Mouth: Mucous membranes are moist.      Pharynx: Oropharynx is clear.   Eyes:      Extraocular Movements: Extraocular movements intact.      Conjunctiva/sclera: Conjunctivae normal.      Pupils: Pupils are equal, round, and reactive to light.   Cardiovascular:      Rate and Rhythm: Normal rate and regular rhythm.      Pulses: Normal pulses.   Pulmonary:      Effort: Pulmonary effort is normal.      Breath sounds: Normal breath sounds.   Abdominal:      General: Abdomen is flat. Bowel sounds are normal.      Palpations: Abdomen is soft.   Musculoskeletal:         General: Normal range of motion.      Cervical back: Normal range of motion and neck supple.   Skin:     General: Skin is warm.   Neurological:      General: No focal deficit present.      Mental Status: He is alert and oriented to person, place, and time. Mental status is at baseline.   Psychiatric:         Mood and Affect: Mood normal.         Behavior: Behavior normal.         Thought Content: Thought content normal.         Judgment: Judgment normal.     Assessment/Plan    Problem List Items Addressed This Visit       Hypertension    Relevant Medications    amLODIPine (Norvasc) 5 mg tablet    Light headedness - Primary     -suspected to be orthostatic related.  -Amlodipine decreased today from 5 to 2.5 mg QD today.  -Daily home BP monitoring and recording then follow-up in 2- 4 weeks recommended.          Patient to return to office in 2- 4 weeks.

## 2024-07-15 NOTE — ASSESSMENT & PLAN NOTE
-suspected to be orthostatic related.  -Amlodipine decreased today from 5 to 2.5 mg QD today.  -Daily home BP monitoring and recording then follow-up in 2- 4 weeks recommended.

## 2024-07-18 ENCOUNTER — HOSPITAL ENCOUNTER (OUTPATIENT)
Dept: NEUROLOGY | Facility: CLINIC | Age: 79
Discharge: HOME | End: 2024-07-18
Payer: MEDICARE

## 2024-07-18 DIAGNOSIS — G56.03 BILATERAL CARPAL TUNNEL SYNDROME: ICD-10-CM

## 2024-07-18 PROCEDURE — 95885 MUSC TST DONE W/NERV TST LIM: CPT | Performed by: PSYCHIATRY & NEUROLOGY

## 2024-07-18 PROCEDURE — 95911 NRV CNDJ TEST 9-10 STUDIES: CPT | Performed by: PSYCHIATRY & NEUROLOGY

## 2024-07-22 DIAGNOSIS — I10 PRIMARY HYPERTENSION: Primary | ICD-10-CM

## 2024-07-23 RX ORDER — AMLODIPINE BESYLATE 5 MG/1
2.5 TABLET ORAL DAILY
Qty: 90 TABLET | Refills: 1 | Status: SHIPPED | OUTPATIENT
Start: 2024-07-23

## 2024-07-29 DIAGNOSIS — I10 PRIMARY HYPERTENSION: Primary | ICD-10-CM

## 2024-07-31 RX ORDER — AMLODIPINE BESYLATE 2.5 MG/1
2.5 TABLET ORAL DAILY
Qty: 90 TABLET | Refills: 1 | Status: SHIPPED | OUTPATIENT
Start: 2024-07-31

## 2024-08-07 NOTE — PROGRESS NOTES
"    ADULT BALANCE FUNCTION TEST (BFT)    Name:  Cristy Forte  :  1945  Age:  78 y.o.  Date of Evaluation:  2024    IMPRESSIONS     Suspected bilateral peripheral vestibular involvement given the following: overall low total SPV for both left and right caloric irrigations - indicative of bilateral vestibulopathy. Evidence of high frequency function given vHIT tracings. The vestibular system appears to be compensated physiologically and uncompensated functionally. While today's evaluation is indicative of bilateral involvement, interpret results with caution as rotational chair testing is considered the \"gold standard\" for diagnosis of bilateral involvement.     There were no indications of central vestibular system pathway involvement. Abnormal observation of gait and transfers given short transfers. Bedside postural control testing was deferred on this date given observation. Patient is at risk of falling based on today's evaluation.    RECOMMENDATIONS     Consider vestibular physical therapy to address uncompensated bilateral vestibulopathy. Emphasis on sensory substitution exercises to account for inadequate vestibular input, gaze stabilization exercises for VOR deficiencies, habituation exercises to triggers, and fall risk prevention.  Given inability to appropriately assess for BPPV, consider diagnostic maneuvers at physical therapy to assess and manage possible BPPV.  Consider re-evaluation as medically indicated.  Maintain a healthy lifestyle to help body function overall.  Continue monitoring per ENT/PCP preference.    Time: 4279-8154    HISTORY     Patient was seen for Balance Function Testing (BFT) due to a history of dizziness/imbalance. Vestibular case history collected via patient-clinician interview, patient chart review, and patient questionnaires.    Patient reported history of dizziness described as imbalance with associated disorientation (\"feeling drunk\")..  Symptoms began over " "one year ago following Stroke diagnosis.  Symptoms are constant to him at this time.   Associated symptoms include eyes \"lag\" behind head movement..  Symptoms are provoked by neck turns and bending over.  Overall the patient's symptoms have increased in frequency over time, becoming more constant to him. Of note, patient has participated in vestibular physical therapy with focus on balance exercises. Patient did perceive minimal improvement in symptoms following treatment. His at-home exercises have NOT been helpful since that time.   This patient has had a total of 0 falls due to their symptoms.   Denied any symptoms provoked by sneezing or coughing, as well as any presence of autophony.   Relevant medical history includes mild photosensitivity, intermittent bilateral tinnitus, patient noticed short-term memory loss, bilateral knee replacement (2010), cervical spinal fusion, and heart valve replacement (2017).   Most recent audiologic evaluation performed on 07/09/2024 by Kadie Dorantes CCC-A revealed normal hearing through 2000 Hz sloping to mild/moderate sensorineural hearing loss, bilaterally. Tympanometry revealed normal eardrum mobility and canal volume, bilaterally.  Most recent vertigo evaluation performed on 07/09/2024 by Jenna James CNP revealed normal oculomotor testing, essentially normal head thrusts, unsteady Romberg (sway front to back), and normal Fukuda.   Patient reported complying with pre-test instructions.     EVALUATION     See VNG, vHIT, & VEMP Raw Data in \"Media\"    TEST RESULTS     BEDSIDE ASSESSMENT TEST  The bedside assessment is an optional portion of the test battery to further assist in differential diagnosis and screen for eye abnormalities which may affect testing.    Otoscopy: non-occluding cerumen.  Extra-Ocular Range of Motion: normal.Extra-Ocular Range of Motion is performed to evaluate any eye abnormalities prior to testing.  Cover/Uncover: normal. Cover/Uncover test is " performed to evaluate for skewed deviation of the eyes prior to testing.  Cervical Neck Exam: abnormal given limited lateral range and essentially no vertical range due to previous history of cervical spinal fusion. Testing will be modified accordingly. Cervical Neck is performed to evaluate any restrictions or pain with neck movement prior to testing.  Vertebral Artery Screen: deferred on this date due to history of cervical spinal fusion. Vertebral Artery Screen is performed to evaluate for vertebrobasilar insufficiency prior to testing.   Ocular Counter-Roll: deferred on this date due to history of cervical spinal fusion. Ocular Counter-Roll is performed to evaluate ocular tilt reaction and assess otolith organ function prior to testing.  VOR Cancellation: normal. VOR Cancellation is performed to evaluate fixation suppression prior to testing.  Modified Clinical Test of Sensory Interaction on Balance (mCTSIB): deferred on this date due to abnormal observation of gait. mCTSIB is performed to evaluate balance with varying sensory information.      VIDEONYSTAGMOGRAPHY (VNG) TEST  VNG provides objective indications of peripheral and central vestibulo-ocular pathway involvement. Ocular motor testing to visually guided targets is conducted using a dual channel video-recording technique for the recording of eye movement in the horizontal and vertical planes. Air caloric testing is performed at 48 degrees C and 24 degrees C.    Spontaneous Nystagmus test was absent. Spontaneous nystagmus testing may help with the identification of an acute or uncompensated peripheral vestibular lesion.   Gaze Nystagmus test was normal. Gaze nystagmus testing is to evaluate for nystagmus that is evoked by holding eye gaze in any particular direction. True gaze nystagmus is amplified when vision is denied.   Random Saccades test was normal. Random saccade testing is to evaluate patient's ability to make fast random eye movements along a  horizontal moving target.   Smooth Pursuit/Tracking test was normal. Smooth pursuit/tracking testing is to evaluate the ability to move eyes with a single smoothly moving target.   Optokinetic nystagmus testing was normal. This full-field OPK test is to evaluate the ability of central nervous system to stabilize vision during sustained head movement after the VOR system loses effectiveness.   [place]-Hallpike testing was deferred on this date given inability to obtain proper head & back position (neck restriction & reported back pain). [place]-Hallpike testing is to provide a diagnosis of Benign Paroxysmal Positional Vertigo (BPPV) of the vertical semicircular canals on the side which is most affected.  Roll testing was deferred on this date given inability to obtain proper head & back position (neck restriction & reported back pain).   Roll testing is to provide a diagnosis of Benign Paroxysmal Positional Vertigo (BPPV) of the horizontal semicircular canals on the side which is most affected.  Positional testing was normal. Of note, modified to 30 degree position given inability to obtain proper head & back position (neck restriction & reported back pain). Positional testing is to evaluate patient's ability to hold a steady gaze while in different positions.  [place] caloric testing was abnormal. Overall low total SPV for both left and right irrigations (total LE: 8 d/s, total RE: 4 d/s). Indicative of bilateral vestibulopathy. Caloric testing is to evaluate for peripheral vestibular lesion.      VIDEO HEAD IMPULSE TEST (vHIT)  The vHIT procedure provides objective assessment of the high frequency vestibulo-ocular reflex (VOR) for each semicircular canal. Rapid, random horizontal and vertical thrusts are applied to the patient's head to provoke the VOR. The vHIT procedure includes two separate paradigms: Head Impulse Paradigm (HIMP) and Suppression Head Impulse Paradigm (SHIMP). SHIMP is an optional paradigm that is not  appropriate to perform for every patient. However, it is appropriate to perform SHIMP when there is verified evidence of possible vestibulopathy in the traditional HIMP test.             Head Impulse Paradigm (HIMP)   Lateral Canal   Canal Gain Overt Saccades Covert Saccades   Right 1.33 absent absent   Left 1.10 absent absent      Vertical canal testing deferred due to inability to obtain proper vertical head impulses (neck restrictions).    Total gain for all canals tested was within normal limits  (<0.80 is abnormal for lateral, <0.70 is abnormal for vertical).  There was no evidence of overt or covert saccades throughout testing      CERVICAL VESTIBULAR EVOKED MYOGENIC POTENTIALS (cVEMP)  The cVEMP procedure is an evoked potential used to test the saccule and its afferent pathway. An asymmetry ratio is utilized to determine side of lesion. The cVEMP was recorded with the patient cervical extension to produce isolated contraction of the ipsilateral sternocleidomastoid (SCM) muscle. The cVEMP was recorded using a 500 Hz tone burst or 1000 Hz tone burst at a rate of 5.1.      Deferred on this date given inability to obtain proper head position (neck restriction).       OCULAR VESTIBULAR EVOKED MYOGENIC POTENTIALS (oVEMP)  The oVEMP procedure is an evoked potential used to test the utricle and its afferent pathway. An asymmetry ratio is utilized to determine side of lesion. This is a contralateral recording. The oVEMP was recorded with the patient seated upright with eyes tilted upward to produce isolated contraction of the contralateral inferior oblique muscle. The oVEMP were recorded using a 500 Hz, 2000 Hz, 4000 Hz tone burst at a rate of 5.1.       Ear Presentation Level Amplitude N1 Latency  P1 Latency  Amplitude Asymmetry Ratio   Right 95 dB nHL 4.94 µV 14.33 ms 18.00 ms 5%   Left 95 dB nHL 4.45 µV 11.67 ms 16.00 ms       Meniere's Disease Screening (2000 Hz): Negative bilaterally  Superior Canal Dehiscence  Screening (4000 Hz): Negative bilaterally    Replicable oVEMP responses were within normal limits, bilaterally.  The amplitude asymmetry ratio of 5% was not significant (>33% = abnormal).      Raw data reviewed by a member of the Vestibular & Balance Disorders team. Testing and interpretation of results completed by KADIE Milton, CCC-A. It was my pleasure to evaluate this patient.       Kadie Milton, CCC-A [facility]  Senior Clinical Vestibular Audiologist

## 2024-08-07 NOTE — PROGRESS NOTES
ADULT BALANCE FUNCTION TEST (BFT)      Name:  Cristy Forte  :  1945  Age:  78 y.o.  Date of Evaluation:  2024    Patient's VEMP testing seen in conjunction with VNG & vHIT testing. Please refer to same day provider note for further details.      CLAUDE Milton, CCC-A [facility]  Senior Clinical Vestibular Audiologist

## 2024-08-13 ENCOUNTER — CLINICAL SUPPORT (OUTPATIENT)
Dept: AUDIOLOGY | Facility: CLINIC | Age: 79
End: 2024-08-13
Payer: MEDICARE

## 2024-08-13 DIAGNOSIS — R42 VERTIGO: Primary | ICD-10-CM

## 2024-08-13 PROCEDURE — 92540 BASIC VESTIBULAR EVALUATION: CPT

## 2024-08-13 PROCEDURE — 92518 VEMP TEST I&R OCULAR: CPT

## 2024-08-13 PROCEDURE — 92700 UNLISTED ORL SERVICE/PX: CPT

## 2024-08-13 PROCEDURE — 92537 CALORIC VSTBLR TEST W/REC: CPT

## 2024-08-13 NOTE — PATIENT INSTRUCTIONS
BALANCE FUNCTION TEST (BFT)  AFTER VISIT SUMMARY      TESTING SUMMARY     The purpose of today's testing was to evaluate for any vestibular system (inner ear) involvement to account for your symptoms of dizziness/imbalance. Deep inside each of your ears, there are 5 balance organs which contribute to your ability to maintain balance and reduce dizziness. Our vestibular system involves 3 semicircular canals (“spinning detectors”) and 2 otolith organs (“gravity sensors”).    IMPRESSIONS     Based on today's evaluation, your vestibular system appears to be weakened on both sides and possibly contributing as a source for your symptoms.    RECOMMENDATIONS     Continue medical follow up with Jenna James CNP.   Consider further vestibular physical therapy to address vestibular loss.   Consider re-evaluation as medically indicated.  Maintain a healthy lifestyle to help body function overall.    Testing and interpretation of results completed by Kadie Milton CCC-A [facility]. It was my pleasure to evaluate this patient.       Kadie Milton, CCC-A [facility]  Senior Clinical Vestibular Audiologist

## 2024-08-15 ENCOUNTER — APPOINTMENT (OUTPATIENT)
Dept: PRIMARY CARE | Facility: CLINIC | Age: 79
End: 2024-08-15
Payer: MEDICARE

## 2024-08-15 VITALS — TEMPERATURE: 98 F | HEART RATE: 76 BPM | SYSTOLIC BLOOD PRESSURE: 111 MMHG | DIASTOLIC BLOOD PRESSURE: 63 MMHG

## 2024-08-15 DIAGNOSIS — I10 PRIMARY HYPERTENSION: ICD-10-CM

## 2024-08-15 DIAGNOSIS — H81.93 VESTIBULOPATHY OF BOTH EARS: ICD-10-CM

## 2024-08-15 DIAGNOSIS — R42 EPISODE OF DIZZINESS: Primary | ICD-10-CM

## 2024-08-15 PROCEDURE — 99214 OFFICE O/P EST MOD 30 MIN: CPT | Performed by: FAMILY MEDICINE

## 2024-08-15 PROCEDURE — 1159F MED LIST DOCD IN RCRD: CPT | Performed by: FAMILY MEDICINE

## 2024-08-15 PROCEDURE — 3074F SYST BP LT 130 MM HG: CPT | Performed by: FAMILY MEDICINE

## 2024-08-15 PROCEDURE — 1036F TOBACCO NON-USER: CPT | Performed by: FAMILY MEDICINE

## 2024-08-15 PROCEDURE — 3078F DIAST BP <80 MM HG: CPT | Performed by: FAMILY MEDICINE

## 2024-08-15 NOTE — PROGRESS NOTES
Subjective   Patient ID: Britta Fan is a 78 y.o. male who presents for Blood Pressure Check.  HPI    BRITTA FAN is a 78 year old Male, with a PMH of HTN, BPH, CAD s/p stenting, afib on xarelto, s/p AVR, right cervical radiculopathy,  lumbar radiculopathy, s/p Bilateral C7 pedicle screw fractures, provoked multiple pulmonary embolism s/p TKA 2010, GERD, ED, adrenal adenoma, here for a follow-up visit about his dizziness.  His BP during the last visit was 97/61.  Today, it is 111/63 with Amlodipine decreased down to 2.5 mg every day for the past month.  The dizziness did not improve despite the recent dose changes.  He also had a balance test done on 8/13/2024 showing:      IMPRESSIONS      Suspected bilateral peripheral vestibular involvement given the following: overall low total SPV for both left and right caloric irrigations - indicative of bilateral vestibulopathy. Evidence of high frequency function given vHIT tracings. The vestibular system appears to be compensated physiologically and uncompensated functionally.     A review of system was completed.  All systems were reviewed and were normal, except for the ones that are listed in the HPI.    Objective   Physical Exam  Constitutional:       Appearance: Normal appearance.   HENT:      Head: Normocephalic and atraumatic.      Right Ear: Tympanic membrane, ear canal and external ear normal.      Left Ear: Tympanic membrane, ear canal and external ear normal.      Nose: Nose normal.      Mouth/Throat:      Mouth: Mucous membranes are moist.      Pharynx: Oropharynx is clear.   Eyes:      Extraocular Movements: Extraocular movements intact.      Conjunctiva/sclera: Conjunctivae normal.      Pupils: Pupils are equal, round, and reactive to light.   Cardiovascular:      Rate and Rhythm: Normal rate and regular rhythm.      Pulses: Normal pulses.   Pulmonary:      Effort: Pulmonary effort is normal.      Breath sounds: Normal breath sounds.   Abdominal:       General: Abdomen is flat. Bowel sounds are normal.      Palpations: Abdomen is soft.   Musculoskeletal:         General: Normal range of motion.      Cervical back: Normal range of motion and neck supple.   Skin:     General: Skin is warm.   Neurological:      General: No focal deficit present.      Mental Status: He is alert and oriented to person, place, and time. Mental status is at baseline.   Psychiatric:         Mood and Affect: Mood normal.         Behavior: Behavior normal.         Thought Content: Thought content normal.         Judgment: Judgment normal.     Assessment/Plan   Problem List Items Addressed This Visit       Hypertension     -Stop Amlodipine 2.5 mg every day today to see if the improvement of patient's BP will improve the dizziness.  -Continue Chlorthalidone, losartan and spironolactone.          Episode of dizziness - Primary     -Probably from a combination of hypotension and vestibulopathy.   -stop Amlodipine 2.5 mg every day today.   -Neurologist referral pending.  -Referral to vestibular PT renewed.   -audiologist recommendations as followed:  RECOMMENDATIONS- 8/13/2024      Consider vestibular physical therapy to address uncompensated bilateral vestibulopathy. Emphasis on sensory substitution exercises to account for inadequate vestibular input, gaze stabilization exercises for VOR deficiencies, habituation exercises to triggers, and fall risk prevention.  Given inability to appropriately assess for BPPV, consider diagnostic maneuvers at physical therapy to assess and manage possible BPPV.  Consider re-evaluation as medically indicated.  Maintain a healthy lifestyle to help body function overall.  Continue monitoring per ENT/PCP preference.            Relevant Orders    Referral to Physical Therapy    Vestibulopathy of both ears     RECOMMENDATIONS from the Audiologist- 8/13/2024      Consider vestibular physical therapy to address uncompensated bilateral vestibulopathy. Emphasis on  sensory substitution exercises to account for inadequate vestibular input, gaze stabilization exercises for VOR deficiencies, habituation exercises to triggers, and fall risk prevention.  Given inability to appropriately assess for BPPV, consider diagnostic maneuvers at physical therapy to assess and manage possible BPPV.  Consider re-evaluation as medically indicated.  Maintain a healthy lifestyle to help body function overall.  Continue monitoring per ENT/PCP preference.            Relevant Orders    Referral to Physical Therapy    Patient to return to office in 4 weeks for reassessment.

## 2024-08-15 NOTE — ASSESSMENT & PLAN NOTE
-Stop Amlodipine 2.5 mg every day today to see if the improvement of patient's BP will improve the dizziness.  -Continue Chlorthalidone, losartan and spironolactone.

## 2024-08-15 NOTE — ASSESSMENT & PLAN NOTE
RECOMMENDATIONS from the Audiologist- 8/13/2024      Consider vestibular physical therapy to address uncompensated bilateral vestibulopathy. Emphasis on sensory substitution exercises to account for inadequate vestibular input, gaze stabilization exercises for VOR deficiencies, habituation exercises to triggers, and fall risk prevention.  Given inability to appropriately assess for BPPV, consider diagnostic maneuvers at physical therapy to assess and manage possible BPPV.  Consider re-evaluation as medically indicated.  Maintain a healthy lifestyle to help body function overall.  Continue monitoring per ENT/PCP preference.

## 2024-08-15 NOTE — ASSESSMENT & PLAN NOTE
-Probably from a combination of hypotension and vestibulopathy.   -stop Amlodipine 2.5 mg every day today.   -Neurologist referral pending.  -Referral to vestibular PT renewed.   -audiologist recommendations as followed:  RECOMMENDATIONS- 8/13/2024      Consider vestibular physical therapy to address uncompensated bilateral vestibulopathy. Emphasis on sensory substitution exercises to account for inadequate vestibular input, gaze stabilization exercises for VOR deficiencies, habituation exercises to triggers, and fall risk prevention.  Given inability to appropriately assess for BPPV, consider diagnostic maneuvers at physical therapy to assess and manage possible BPPV.  Consider re-evaluation as medically indicated.  Maintain a healthy lifestyle to help body function overall.  Continue monitoring per ENT/PCP preference.

## 2024-08-15 NOTE — ADDENDUM NOTE
Addended by: JAVY MENCHACA on: 8/15/2024 10:43 AM     Modules accepted: Orders     Statement Selected

## 2024-09-03 ENCOUNTER — OFFICE VISIT (OUTPATIENT)
Dept: CARDIOLOGY | Facility: HOSPITAL | Age: 79
End: 2024-09-03
Payer: MEDICARE

## 2024-09-03 ENCOUNTER — HOSPITAL ENCOUNTER (OUTPATIENT)
Dept: CARDIOLOGY | Facility: HOSPITAL | Age: 79
Discharge: HOME | End: 2024-09-03
Payer: MEDICARE

## 2024-09-03 VITALS
HEIGHT: 70 IN | SYSTOLIC BLOOD PRESSURE: 115 MMHG | WEIGHT: 237 LBS | HEART RATE: 81 BPM | BODY MASS INDEX: 33.93 KG/M2 | DIASTOLIC BLOOD PRESSURE: 70 MMHG

## 2024-09-03 DIAGNOSIS — I48.91 ATRIAL FIBRILLATION, UNSPECIFIED TYPE (MULTI): Primary | ICD-10-CM

## 2024-09-03 DIAGNOSIS — I35.8 OTHER NONRHEUMATIC AORTIC VALVE DISORDERS: ICD-10-CM

## 2024-09-03 DIAGNOSIS — Z95.2 S/P AVR: ICD-10-CM

## 2024-09-03 LAB
ATRIAL RATE: 81 BPM
P AXIS: 60 DEGREES
P OFFSET: 194 MS
P ONSET: 139 MS
PR INTERVAL: 168 MS
Q ONSET: 223 MS
QRS COUNT: 14 BEATS
QRS DURATION: 74 MS
QT INTERVAL: 372 MS
QTC CALCULATION(BAZETT): 432 MS
QTC FREDERICIA: 411 MS
R AXIS: -5 DEGREES
T AXIS: 59 DEGREES
T OFFSET: 409 MS
VENTRICULAR RATE: 81 BPM

## 2024-09-03 PROCEDURE — 93306 TTE W/DOPPLER COMPLETE: CPT

## 2024-09-03 PROCEDURE — 3078F DIAST BP <80 MM HG: CPT | Performed by: INTERNAL MEDICINE

## 2024-09-03 PROCEDURE — 93005 ELECTROCARDIOGRAM TRACING: CPT | Performed by: INTERNAL MEDICINE

## 2024-09-03 PROCEDURE — 99214 OFFICE O/P EST MOD 30 MIN: CPT | Performed by: INTERNAL MEDICINE

## 2024-09-03 PROCEDURE — 93010 ELECTROCARDIOGRAM REPORT: CPT | Performed by: INTERNAL MEDICINE

## 2024-09-03 PROCEDURE — 1036F TOBACCO NON-USER: CPT | Performed by: INTERNAL MEDICINE

## 2024-09-03 PROCEDURE — 1159F MED LIST DOCD IN RCRD: CPT | Performed by: INTERNAL MEDICINE

## 2024-09-03 PROCEDURE — 1160F RVW MEDS BY RX/DR IN RCRD: CPT | Performed by: INTERNAL MEDICINE

## 2024-09-03 PROCEDURE — 3074F SYST BP LT 130 MM HG: CPT | Performed by: INTERNAL MEDICINE

## 2024-09-03 PROCEDURE — 2500000004 HC RX 250 GENERAL PHARMACY W/ HCPCS (ALT 636 FOR OP/ED): Performed by: INTERNAL MEDICINE

## 2024-09-03 NOTE — PROGRESS NOTES
Subjective:  Patient returns for a routine 6-month follow-up.  He is a delightful 78-year-old gentleman with hypertension and hyperlipidemia.  He is status post remote LAD stenting with overlapping stents.  He underwent more recent AVR back in 2018 for severe aortic stenosis.    He has generally been clinically stable.  He continues to struggle a bit with some dizziness and balance problems that started after his minor stroke.  This was presumably due to atrial fibrillation, so we have maintained him on anticoagulation in addition to aspirin to protect his stent.    He has not had any hospitalizations and denies any other new health concerns.  His medications remain unchanged except he did have his amlodipine stopped for some low blood pressure readings.  He overall is generally quite happy and comfortable with how he is doing.    He denies any fevers or chills.  He denies any angina or dyspnea.  His activity tolerance remains reasonably good.  He denies any palpitations or recurrent stroke symptomatology.  He denies any peripheral edema.    Objective:  General: Alert, usual delightful self.  HEENT: Unchanged.  Lungs: Clear without crackles or wheezing.  Cardiac: Distant heart tones with soft systolic murmur.  Abdomen: Nontender.  Extremities: No edema.  Skin: No acute rash.  Neuro: Grossly intact.    EKG: Sinus rhythm with PVCs.  No acute changes.    Lipid panel: Cholesterol-111, HDL-45, LDL-49, TG-88.    Impression/plan:  Patient is doing quite nicely at this time.  He is not having any problematic anginal type symptoms, so I did not think we needed to embark on a repeat ischemic workup.    He is not having any clinical symptoms or signs to progress prosthetic aortic valve dysfunction.  I will review his echo results with him to be sure that there are no surprises.    His blood pressure remains in good range at this time, so I did not think we needed to reinitiate amlodipine.    He remains in sinus rhythm but we  will keep him anticoagulated given his probable prior A-fib spell as the cause of his minor stroke.    His lipid panel looks excellent on his current regimen, so we will continue this unchanged.    I will see him back in follow-up in 6 months assuming no intercurrent concerns.    Patient instructions:    Continue current medications unchanged.    I will call you to review your echo results in a day or 2.    Return to clinic in 6 months.    Call for any intercurrent concerns.

## 2024-09-04 ENCOUNTER — TELEPHONE (OUTPATIENT)
Dept: NEUROLOGY | Facility: CLINIC | Age: 79
End: 2024-09-04
Payer: MEDICARE

## 2024-09-04 DIAGNOSIS — G56.03 BILATERAL CARPAL TUNNEL SYNDROME: Primary | ICD-10-CM

## 2024-09-04 LAB
AORTIC VALVE MEAN GRADIENT: 17.7 MMHG
AORTIC VALVE PEAK VELOCITY: 2.89 M/S
AV PEAK GRADIENT: 33.4 MMHG
AVA (PEAK VEL): 1.44 CM2
AVA (VTI): 1.62 CM2
EJECTION FRACTION APICAL 4 CHAMBER: 81.8
EJECTION FRACTION: 80 %
LEFT ATRIUM VOLUME AREA LENGTH INDEX BSA: 42 ML/M2
LEFT VENTRICLE INTERNAL DIMENSION DIASTOLE: 4.51 CM (ref 3.5–6)
LEFT VENTRICULAR OUTFLOW TRACT DIAMETER: 1.96 CM
MITRAL VALVE E/A RATIO: 0.63
RIGHT VENTRICLE FREE WALL PEAK S': 17 CM/S
RIGHT VENTRICLE PEAK SYSTOLIC PRESSURE: 36.6 MMHG
TRICUSPID ANNULAR PLANE SYSTOLIC EXCURSION: 2.6 CM

## 2024-09-04 NOTE — TELEPHONE ENCOUNTER
----- Message from Freda Sharma sent at 9/4/2024  2:08 PM EDT -----  EMG shows carpal tunnel bilaterally, pretty significant.   At visit didn't want any surgery but would consider splint.  I put in OT consult for her to see if they can help.  ----- Message -----  From: Interface, Neuro Results In  Sent: 7/18/2024   4:44 PM EDT  To: Freda Sharma MD

## 2024-09-04 NOTE — TELEPHONE ENCOUNTER
Patient aware and verbalized understanding. He is agreeable with OT consult.       ----- Message from Freda Sharma sent at 9/4/2024  2:08 PM EDT -----  EMG shows carpal tunnel bilaterally, pretty significant.   At visit didn't want any surgery but would consider splint.  I put in OT consult for her to see if they can help.  ----- Message -----  From: Interface, Neuro Results In  Sent: 7/18/2024   4:44 PM EDT  To: Freda Sharma MD

## 2024-09-08 NOTE — PROGRESS NOTES
Physical Therapy    Physical Therapy Evaluation and Treatment      Patient Name: Cristy Forte  MRN: [id number]  Today's Date: 9/10/2024    Time Entry:   Time Calculation  Start Time: 1030  Stop Time: 1145  Time Calculation (min): 75 min  PT Evaluation Time Entry  PT Evaluation (Low) Time Entry: 30  PT Therapeutic Procedures Time Entry  Neuromuscular Re-Education Time Entry: 40                 Visit number: 1  Insurance:   POC: end 12/8/2024  Primary diagnosis: imbalance    Assessment:      Patient presents with a diagnosis of dizziness and new diagnosis of bilateral vestibular hypofunction with associated decreased ROM, strength, gait, balance, increased fall risk and decreased knowledge of how to manage symptoms independently, difficulty with community ambulation, difficulty with stair negotiation, functional tasks including path veer with ambulation, difficulty shopping, imbalance with change of direction, decreased confidence in balance and overall decreased quality of life.  Patient rehabilitation is complicated by his peripheral neuropathy, weakness, decreased compliance with prior HEP and significant decline in function.  The patient can benefit from skilled therapy interventions to address the patient's deficits and maximize the quality of his life.    Plan:  OP PT Plan  Treatment/Interventions: Education/ Instruction, Gait training, Neuromuscular re-education, Therapeutic activities, Therapeutic exercises  PT Plan: Skilled PT  PT Frequency: 1 time per week  Duration: 12 weeks  Onset Date: 08/15/24  Certification Period Start Date: 09/09/24  Certification Period End Date: 12/08/24  Number of Treatments Authorized: 20  Rehab Potential: Good  Plan of Care Agreement: Patient    Current Problem:   1. Imbalance  Follow Up In Physical Therapy      2. Episode of dizziness  Referral to Physical Therapy    Follow Up In Physical Therapy      3. Vestibulopathy of both ears  Referral to Physical Therapy    Follow Up  "In Physical Therapy      4. Weakness of both lower extremities  Follow Up In Physical Therapy          Subjective    Patient states that he feels off balance and veers in both directions, unsure of what causes is to happen, eye feel heavy, waking in community not noticed in the house, grocery store, watching TV.  Denies with driving.      Off balance currently, 0/10, last week 0-4/10.    Patient states that he has not been doing his exercises consistently.      General:  General  Preferred Learning Style: auditory, kinesthetic, verbal, visual, written  PER SHAAN BLALARD NOTE DATED 8/13/2024:    IMPRESSIONS      Suspected bilateral peripheral vestibular involvement given the following: overall low total SPV for both left and right caloric irrigations - indicative of bilateral vestibulopathy. Evidence of high frequency function given vHIT tracings. The vestibular system appears to be compensated physiologically and uncompensated functionally. While today's evaluation is indicative of bilateral involvement, interpret results with caution as rotational chair testing is considered the \"gold standard\" for diagnosis of bilateral involvement.      There were no indications of central vestibular system pathway involvement. Abnormal observation of gait and transfers given short transfers. Bedside postural control testing was deferred on this date given observation. Patient is at risk of falling based on today's evaluation.     RECOMMENDATIONS      Consider vestibular physical therapy to address uncompensated bilateral vestibulopathy. Emphasis on sensory substitution exercises to account for inadequate vestibular input, gaze stabilization exercises for VOR deficiencies, habituation exercises to triggers, and fall risk prevention.  Given inability to appropriately assess for BPPV, consider diagnostic maneuvers at physical therapy to assess and manage possible BPPV.  Consider re-evaluation as medically indicated.  Maintain a " healthy lifestyle to help body function overall.  Continue monitoring per ENT/PCP preference.     Precautions:  Precautions  STEADI Fall Risk Score (The score of 4 or more indicates an increased risk of falling): 4  Vital Signs:     Pain:  Pain Assessment  Pain Assessment: 0-10  0-10 (Numeric) Pain Score:  (stiffness in knees)  Home Living:     Multi story with laundry in basement  Prior Level of Function:     Independent without balance issue  Objective     LOWER EXTREMITY ROM:  Exceptions only noted  HIP  ROM      RIGHT   LEFT   EXTENSION     FLEXION     IR ROTATION     ER ROTATION     ABDUCTION     ADDUCTION       KNEE ROM      RIGHT   LEFT   EXTENSION     FLEXION      100       95     ANKLE ROM      RIGHT   LEFT   DORSIFLEXION 0 0   PLANTAR FLEXION     INVERSION     EVERSION         LOWER EXTREMITY STRENGTH:  HIP STRENGTH      RIGHT   LEFT   EXTENSION 3+/5 3+/5   FLEXION 3+/5 3+   ABDUCTION 3+/5 3+/5   ADDUCTION 3+/5 3+/5     KNEE STRENGTH      RIGHT   LEFT   EXTENSION     4/5      4/5    FLEXION     4/5     4/5      ANKLE STRENGTH      RIGHT   LEFT   DORSIFLEXION 3+/5 3+/5   PLANTAR FLEXION 3+/5 3+/5   INVERSION 3+/5 3+/5   EVERSION 3+/5 3+/5       STATIC BALANCE:    1/2 TANDEM     EYES OPEN EYES CLOSED   SOLID 30 12   FOAM 30 5      CTSIB-M: 77/120    GAIT:  Flexed at hips, increased thoracic kyphosis, forward head, minimal push off and heel strike, decreased foot clearance and increased lateral trunk sway.    Outcome Measures:  Other Measures  5x Sit to Stand: 26  10M Walk Test: 15  Dizziness Handicap Inventory: 36   DHI 36  FGA 13    Treatments:    Neuro 40  1/2 Tandem EO 30 seconds*  1/2 Tandem EO Horizontal head turns 10 times 1 set *   1/2 Tandem EO Vertical head turns 10 times 1 set *   1/2 Tandem EC 30 seconds *    Cues to use sensation of touch to maintain balance with EC to minimize postural sway and decrease tendency to loose balance    EDUCATION:       Goals:  Active       PT Problem       PT Goal  1:  Patient decrease DHI to 10 points for self reported improvement in function.        Start:  09/10/24    Expected End:  12/08/24            PT Goal 2:  Patient will increase hip flexion strength to 4-/5 to allow increased ease in community ambulation and stair negotiation.        Start:  09/10/24    Expected End:  12/08/24            PT Goal 3:  Patient will increase ankle DF to 5 degrees to improve gait mechanics and increase ease in ankle strategies.        Start:  09/10/24    Expected End:  12/08/24            PT Goal 4:  Patient will decrease their TUG cognitive time to 12 decrease the patient fall risk.        Start:  09/10/24    Expected End:  12/08/24            PT Goal 5:  Patient will increase their Functional Gait Assessment to 24/30 to decrease their risk for falls.        Start:  09/10/24    Expected End:  12/08/24            Patient Stated Goal 1:to feel less imbalance       Start:  09/10/24    Expected End:  12/08/24

## 2024-09-09 ENCOUNTER — CLINICAL SUPPORT (OUTPATIENT)
Dept: PHYSICAL THERAPY | Facility: CLINIC | Age: 79
End: 2024-09-09
Payer: MEDICARE

## 2024-09-09 DIAGNOSIS — R42 EPISODE OF DIZZINESS: ICD-10-CM

## 2024-09-09 DIAGNOSIS — R26.89 IMBALANCE: Primary | ICD-10-CM

## 2024-09-09 DIAGNOSIS — R29.898 WEAKNESS OF BOTH LOWER EXTREMITIES: ICD-10-CM

## 2024-09-09 DIAGNOSIS — H81.93 VESTIBULOPATHY OF BOTH EARS: ICD-10-CM

## 2024-09-09 PROCEDURE — 97110 THERAPEUTIC EXERCISES: CPT | Mod: GP | Performed by: PHYSICAL THERAPIST

## 2024-09-09 PROCEDURE — 97161 PT EVAL LOW COMPLEX 20 MIN: CPT | Mod: GP | Performed by: PHYSICAL THERAPIST

## 2024-09-09 ASSESSMENT — PAIN - FUNCTIONAL ASSESSMENT: PAIN_FUNCTIONAL_ASSESSMENT: 0-10

## 2024-09-09 ASSESSMENT — ENCOUNTER SYMPTOMS
DEPRESSION: 0
LOSS OF SENSATION IN FEET: 1
OCCASIONAL FEELINGS OF UNSTEADINESS: 1

## 2024-09-09 ASSESSMENT — 10 METER WALK TEST (10METWT): AVERAGE: 15

## 2024-09-16 ENCOUNTER — APPOINTMENT (OUTPATIENT)
Dept: PRIMARY CARE | Facility: CLINIC | Age: 79
End: 2024-09-16
Payer: MEDICARE

## 2024-09-16 VITALS — HEART RATE: 73 BPM | SYSTOLIC BLOOD PRESSURE: 104 MMHG | TEMPERATURE: 98 F | DIASTOLIC BLOOD PRESSURE: 64 MMHG

## 2024-09-16 DIAGNOSIS — E11.21 CONTROLLED TYPE 2 DIABETES MELLITUS WITH DIABETIC NEPHROPATHY, WITHOUT LONG-TERM CURRENT USE OF INSULIN: ICD-10-CM

## 2024-09-16 DIAGNOSIS — R42 LIGHT HEADEDNESS: Primary | ICD-10-CM

## 2024-09-16 PROCEDURE — 1159F MED LIST DOCD IN RCRD: CPT | Performed by: FAMILY MEDICINE

## 2024-09-16 PROCEDURE — 1036F TOBACCO NON-USER: CPT | Performed by: FAMILY MEDICINE

## 2024-09-16 PROCEDURE — 99214 OFFICE O/P EST MOD 30 MIN: CPT | Performed by: FAMILY MEDICINE

## 2024-09-16 PROCEDURE — 3074F SYST BP LT 130 MM HG: CPT | Performed by: FAMILY MEDICINE

## 2024-09-16 PROCEDURE — 3078F DIAST BP <80 MM HG: CPT | Performed by: FAMILY MEDICINE

## 2024-09-16 RX ORDER — LANCETS
1 EACH MISCELLANEOUS 2 TIMES DAILY PRN
Qty: 50 EACH | Refills: 3 | Status: SHIPPED | OUTPATIENT
Start: 2024-09-16 | End: 2025-09-16

## 2024-09-16 RX ORDER — DEXTROSE 4 G
TABLET,CHEWABLE ORAL
Qty: 1 EACH | Refills: 0 | Status: SHIPPED | OUTPATIENT
Start: 2024-09-16

## 2024-09-16 RX ORDER — HYDROCHLOROTHIAZIDE 25 MG/1
25 TABLET ORAL DAILY
Qty: 30 TABLET | Refills: 5 | Status: SHIPPED | OUTPATIENT
Start: 2024-09-16 | End: 2025-03-15

## 2024-09-16 ASSESSMENT — ENCOUNTER SYMPTOMS: DIZZINESS: 1

## 2024-09-16 NOTE — PROGRESS NOTES
Report called to 3W nurse.   Subjective   Patient ID: Britta Fan is a 78 y.o. male who presents for Follow-up and Dizziness.  Dizziness  BRITTA FAN is a 78 year old Male, with a PMH of HTN, BPH, CAD s/p stenting, afib on xarelto, s/p AVR, right cervical radiculopathy,  lumbar radiculopathy, s/p Bilateral C7 pedicle screw fractures, provoked multiple pulmonary embolism s/p TKA 2010, GERD, ED, adrenal adenoma, here for a follow-up visit about his dizziness.  His BP during the last visit was 97/61.  Today, it is 104/63 with Amlodipine 2.5 mg every day stopped for the past month.  The dizziness did not improve despite the recent dose changes.  He is still Chlorthalidone, losartan and spironolactone.  Patient is also requesting to replace chlorthalidone with hydrochlorothiazide.  It used to better control his upper and lower extremities edema.     A review of system was completed.  All systems were reviewed and were normal, except for the ones that are listed in the HPI.    Objective   Physical Exam  Constitutional:       Appearance: Normal appearance.   HENT:      Head: Normocephalic and atraumatic.      Right Ear: Tympanic membrane, ear canal and external ear normal.      Left Ear: Tympanic membrane, ear canal and external ear normal.      Nose: Nose normal.      Mouth/Throat:      Mouth: Mucous membranes are moist.      Pharynx: Oropharynx is clear.   Eyes:      Extraocular Movements: Extraocular movements intact.      Conjunctiva/sclera: Conjunctivae normal.      Pupils: Pupils are equal, round, and reactive to light.   Cardiovascular:      Rate and Rhythm: Normal rate and regular rhythm.      Pulses: Normal pulses.   Pulmonary:      Effort: Pulmonary effort is normal.      Breath sounds: Normal breath sounds.   Abdominal:      General: Abdomen is flat. Bowel sounds are normal.      Palpations: Abdomen is soft.   Musculoskeletal:         General: Normal range of motion.      Cervical back: Normal range of motion and neck supple.    Skin:     General: Skin is warm.   Neurological:      General: No focal deficit present.      Mental Status: He is alert and oriented to person, place, and time. Mental status is at baseline.   Psychiatric:         Mood and Affect: Mood normal.         Behavior: Behavior normal.         Thought Content: Thought content normal.         Judgment: Judgment normal.   Assessment/Plan   Problem List Items Addressed This Visit       Controlled type 2 diabetes mellitus with diabetic nephropathy, without long-term current use of insulin (Multi)     -Glucose monitor, test strips and lancets started today.          Relevant Medications    blood-glucose meter misc    lancets misc    blood sugar diagnostic strip    Light headedness - Primary     -suspected to be a combination of vestibular and orthostatic etiology.  -Amlodipine stopped a month ago.  -Chlorthalidone 25 mg every day stopped today.  Replaced by hydrochlorothiazide 25 mg every day today for better diuretic response per patient's request.  - losartan decreased to 1/2 tab every day today in order to improve low BP readings.  BP today: 104/ 64.  -Continue spironolactone 25 mg every day today.   -Daily home BP monitoring and recording then follow-up in 2- 4 weeks recommended.          Relevant Medications    hydroCHLOROthiazide (HYDRODiuril) 25 mg tablet    Patient to return to office in 4 weeks for reassessment.

## 2024-09-16 NOTE — ASSESSMENT & PLAN NOTE
-suspected to be a combination of vestibular and orthostatic etiology.  -Amlodipine stopped a month ago.  -Chlorthalidone 25 mg every day stopped today.  Replaced by hydrochlorothiazide 25 mg every day today for better diuretic response per patient's request.  - losartan decreased to 1/2 tab every day today in order to improve low BP readings.  BP today: 104/ 64.  -Continue spironolactone 25 mg every day today.   -Daily home BP monitoring and recording then follow-up in 2- 4 weeks recommended.

## 2024-09-18 NOTE — PROGRESS NOTES
"Physical Therapy    Physical Therapy Treatment    Patient Name: Cristy Forte  MRN: [id number]  Today's Date: 9/19/2024    Time Entry:   Time Calculation  Start Time: 1030  Stop Time: 1115  Time Calculation (min): 45 min     PT Therapeutic Procedures Time Entry  Neuromuscular Re-Education Time Entry: 30  Therapeutic Exercise Time Entry: 10                 Visit number: 2  Insurance:   POC: end 12/8/2024  Primary diagnosis: imbalance    Assessment:     Patient with improved understanding of how to correct balance and how to decrease visual reliance.  Patient with improved upright posture after stretching and improved standing tolerance    Plan:     Add standing hip strengthening    Current Problem  1. Episode of dizziness  Follow Up In Physical Therapy      2. Vestibulopathy of both ears  Follow Up In Physical Therapy      3. Imbalance  Follow Up In Physical Therapy      4. Weakness of both lower extremities  Follow Up In Physical Therapy        Subjective    Patient states that he did his exercises some and has not been doing exercises in the corner.  Patient reports that he can not stand up straight.    Precautions       Pain  Pain Assessment  Pain Assessment: 0-10  0-10 (Numeric) Pain Score: 0 - No pain    Objective   Treatments:    9/19/2024:  Therapeutic exercises: 15 min    Supine quad stretch 3 times 30\" *    Neuro re ed 25 min  1/2 Tandem EO 30 seconds*  1/2 Tandem EO Horizontal head turns 10 times 1 set *   1/2 Tandem EO Vertical head turns 10 times 1 set *   1/2 Tandem EC 30 seconds *    Patient cues to feel his feet when doing exercises in corner and do not allow pressure to change with head motions or with eyes closed as well as to correct upon first noticing change of position.    EVAL:  1/2 Tandem EO 30 seconds*  1/2 Tandem EO Horizontal head turns 10 times 1 set *   1/2 Tandem EO Vertical head turns 10 times 1 set *   1/2 Tandem EC 30 seconds *     Cues to use sensation of touch to maintain balance " with EC to minimize postural sway and decrease tendency to loose balance  OP EDUCATION:       Goals:  Active       PT Problem       PT Goal 1:  Patient decrease DHI to 10 points for self reported improvement in function.        Start:  09/10/24    Expected End:  12/08/24            PT Goal 2:  Patient will increase hip flexion strength to 4-/5 to allow increased ease in community ambulation and stair negotiation.        Start:  09/10/24    Expected End:  12/08/24            PT Goal 3:  Patient will increase ankle DF to 5 degrees to improve gait mechanics and increase ease in ankle strategies.        Start:  09/10/24    Expected End:  12/08/24            PT Goal 4:  Patient will decrease their TUG cognitive time to 12 decrease the patient fall risk.        Start:  09/10/24    Expected End:  12/08/24            PT Goal 5:  Patient will increase their Functional Gait Assessment to 24/30 to decrease their risk for falls.        Start:  09/10/24    Expected End:  12/08/24            Patient Stated Goal 1:to feel less imbalance       Start:  09/10/24    Expected End:  12/08/24

## 2024-09-19 ENCOUNTER — TREATMENT (OUTPATIENT)
Dept: PHYSICAL THERAPY | Facility: CLINIC | Age: 79
End: 2024-09-19
Payer: MEDICARE

## 2024-09-19 DIAGNOSIS — R26.89 IMBALANCE: ICD-10-CM

## 2024-09-19 DIAGNOSIS — R29.898 WEAKNESS OF BOTH LOWER EXTREMITIES: ICD-10-CM

## 2024-09-19 DIAGNOSIS — H81.93 VESTIBULOPATHY OF BOTH EARS: ICD-10-CM

## 2024-09-19 DIAGNOSIS — R42 EPISODE OF DIZZINESS: ICD-10-CM

## 2024-09-19 PROCEDURE — 97112 NEUROMUSCULAR REEDUCATION: CPT | Mod: GP | Performed by: PHYSICAL THERAPIST

## 2024-09-19 PROCEDURE — 97110 THERAPEUTIC EXERCISES: CPT | Mod: GP | Performed by: PHYSICAL THERAPIST

## 2024-09-19 ASSESSMENT — PAIN - FUNCTIONAL ASSESSMENT: PAIN_FUNCTIONAL_ASSESSMENT: 0-10

## 2024-09-19 ASSESSMENT — PAIN SCALES - GENERAL: PAINLEVEL_OUTOF10: 0 - NO PAIN

## 2024-09-22 NOTE — PROGRESS NOTES
"Physical Therapy    Physical Therapy Treatment    Patient Name: Cristy Forte  MRN: [id number]  Today's Date: 9/26/2024    Time Entry:   Time Calculation  Start Time: 0930  Stop Time: 1015  Time Calculation (min): 45 min     PT Therapeutic Procedures Time Entry  Therapeutic Exercise Time Entry: 40                 Visit number: 3  Insurance:   POC: end 12/8/2024  Primary diagnosis: imbalance    Assessment:     Patient with improved knee control with descending the stairs and increased awareness of weakness in right hip.    Plan:     Add standing hip strengthening    Current Problem  Problem List Items Addressed This Visit             ICD-10-CM    Episode of dizziness R42    Imbalance - Primary R26.89    Vestibulopathy of both ears H81.93     Other Visit Diagnoses         Codes    Weakness of both lower extremities     R29.898              Subjective    Patient states that he did his exercises 5 day a week since last here.  Patient states that he missed 2 days due to going to girlfriend house and shopping.    Precautions       Pain  Pain Assessment  Pain Assessment: 0-10  0-10 (Numeric) Pain Score: 0 - No pain    Objective   Treatments:  9/26/2024:  Step ups forward 10 times 1 set * 6\"  Lateral step ups 10 times 1 set * 6\"  Step down 10 times 1 set * 6\"  Step quad stretch 3 times 30\" *  Knee flexion seated 3 times 30\" *   Seated hip abd blue 10 times 1 set *     9/19/2024:  Therapeutic exercises: 15 min    Supine quad stretch 3 times 30\" *    Neuro re ed 25 min  1/2 Tandem EO 30 seconds*  1/2 Tandem EO Horizontal head turns 10 times 1 set *   1/2 Tandem EO Vertical head turns 10 times 1 set *   1/2 Tandem EC 30 seconds *    Patient cues to feel his feet when doing exercises in corner and do not allow pressure to change with head motions or with eyes closed as well as to correct upon first noticing change of position.    EVAL:  1/2 Tandem EO 30 seconds*  1/2 Tandem EO Horizontal head turns 10 times 1 set *   1/2 " Tandem EO Vertical head turns 10 times 1 set *   1/2 Tandem EC 30 seconds *     Cues to use sensation of touch to maintain balance with EC to minimize postural sway and decrease tendency to loose balance  OP EDUCATION:       Goals:  Active       PT Problem       PT Goal 1:  Patient decrease DHI to 10 points for self reported improvement in function.        Start:  09/10/24    Expected End:  12/08/24            PT Goal 2:  Patient will increase hip flexion strength to 4-/5 to allow increased ease in community ambulation and stair negotiation.        Start:  09/10/24    Expected End:  12/08/24            PT Goal 3:  Patient will increase ankle DF to 5 degrees to improve gait mechanics and increase ease in ankle strategies.        Start:  09/10/24    Expected End:  12/08/24            PT Goal 4:  Patient will decrease their TUG cognitive time to 12 decrease the patient fall risk.        Start:  09/10/24    Expected End:  12/08/24            PT Goal 5:  Patient will increase their Functional Gait Assessment to 24/30 to decrease their risk for falls.        Start:  09/10/24    Expected End:  12/08/24            Patient Stated Goal 1:to feel less imbalance       Start:  09/10/24    Expected End:  12/08/24

## 2024-09-23 DIAGNOSIS — E78.00 PURE HYPERCHOLESTEROLEMIA: ICD-10-CM

## 2024-09-24 RX ORDER — EZETIMIBE 10 MG/1
10 TABLET ORAL DAILY
Qty: 90 TABLET | Refills: 1 | Status: SHIPPED | OUTPATIENT
Start: 2024-09-24

## 2024-09-25 DIAGNOSIS — Z79.4 TYPE 2 DIABETES MELLITUS WITH OTHER SPECIFIED COMPLICATION, WITH LONG-TERM CURRENT USE OF INSULIN: ICD-10-CM

## 2024-09-25 DIAGNOSIS — E11.69 TYPE 2 DIABETES MELLITUS WITH OTHER SPECIFIED COMPLICATION, WITH LONG-TERM CURRENT USE OF INSULIN: ICD-10-CM

## 2024-09-26 ENCOUNTER — TREATMENT (OUTPATIENT)
Dept: PHYSICAL THERAPY | Facility: CLINIC | Age: 79
End: 2024-09-26
Payer: MEDICARE

## 2024-09-26 DIAGNOSIS — R29.898 WEAKNESS OF BOTH LOWER EXTREMITIES: ICD-10-CM

## 2024-09-26 DIAGNOSIS — R42 EPISODE OF DIZZINESS: ICD-10-CM

## 2024-09-26 DIAGNOSIS — R26.89 IMBALANCE: Primary | ICD-10-CM

## 2024-09-26 DIAGNOSIS — H81.93 VESTIBULOPATHY OF BOTH EARS: ICD-10-CM

## 2024-09-26 PROCEDURE — 97110 THERAPEUTIC EXERCISES: CPT | Mod: GP | Performed by: PHYSICAL THERAPIST

## 2024-09-26 RX ORDER — METFORMIN HYDROCHLORIDE 750 MG/1
750 TABLET, EXTENDED RELEASE ORAL
Qty: 90 TABLET | Refills: 1 | Status: SHIPPED | OUTPATIENT
Start: 2024-09-26 | End: 2025-09-26

## 2024-09-26 ASSESSMENT — PAIN SCALES - GENERAL: PAINLEVEL_OUTOF10: 0 - NO PAIN

## 2024-09-26 ASSESSMENT — PAIN - FUNCTIONAL ASSESSMENT: PAIN_FUNCTIONAL_ASSESSMENT: 0-10

## 2024-09-28 NOTE — PROGRESS NOTES
"Physical Therapy    Physical Therapy Treatment    Patient Name: Cristy Forte  MRN: [id number]  Today's Date: 10/3/2024    Time Entry:   Time Calculation  Start Time: 0930  Stop Time: 1015  Time Calculation (min): 45 min     PT Therapeutic Procedures Time Entry  Therapeutic Exercise Time Entry: 40                 Visit number: 4  Insurance:   POC: end 12/8/2024  Primary diagnosis: imbalance    Assessment:     Patient with improved knee control with descending the stairs and increased awareness of weakness in right hip.    Plan:     Add standing hip strengthening    Current Problem  Problem List Items Addressed This Visit             ICD-10-CM    Episode of dizziness R42    Imbalance - Primary R26.89    Vestibulopathy of both ears H81.93     Other Visit Diagnoses         Codes    Weakness of both lower extremities     R29.898          Subjective    Patient states that the back of his legs are sore and tired.      Precautions  Precautions  STEADI Fall Risk Score (The score of 4 or more indicates an increased risk of falling): 4    Pain  Pain Assessment  Pain Assessment: 0-10  0-10 (Numeric) Pain Score: 0 - No pain    Objective   Treatments:    10/3/2024:  Therapeutic exercises: 40 min   Lateral cervical flexion 3 times 30\" *  Levator scap 3 times 30\" *  Supine chin tuck 10 times 1 set *  Supine scapular retraction 10 times 1 set *     9/26/2024:  Step ups forward 10 times 1 set * 6\"  Lateral step ups 10 times 1 set * 6\"  Step down 10 times 1 set * 6\"  Step quad stretch 3 times 30\" *  Knee flexion seated 3 times 30\" *   Seated hip abd blue 10 times 1 set *     9/19/2024:  Therapeutic exercises: 15 min    Supine quad stretch 3 times 30\" *    Neuro re ed 25 min  1/2 Tandem EO 30 seconds*  1/2 Tandem EO Horizontal head turns 10 times 1 set *   1/2 Tandem EO Vertical head turns 10 times 1 set *   1/2 Tandem EC 30 seconds *    Patient cues to feel his feet when doing exercises in corner and do not allow pressure to " change with head motions or with eyes closed as well as to correct upon first noticing change of position.    EVAL:  1/2 Tandem EO 30 seconds*  1/2 Tandem EO Horizontal head turns 10 times 1 set *   1/2 Tandem EO Vertical head turns 10 times 1 set *   1/2 Tandem EC 30 seconds *     Cues to use sensation of touch to maintain balance with EC to minimize postural sway and decrease tendency to loose balance  OP EDUCATION:       Goals:  Active       PT Problem       PT Goal 1:  Patient decrease DHI to 10 points for self reported improvement in function.        Start:  09/10/24    Expected End:  12/08/24            PT Goal 2:  Patient will increase hip flexion strength to 4-/5 to allow increased ease in community ambulation and stair negotiation.        Start:  09/10/24    Expected End:  12/08/24            PT Goal 3:  Patient will increase ankle DF to 5 degrees to improve gait mechanics and increase ease in ankle strategies.        Start:  09/10/24    Expected End:  12/08/24            PT Goal 4:  Patient will decrease their TUG cognitive time to 12 decrease the patient fall risk.        Start:  09/10/24    Expected End:  12/08/24            PT Goal 5:  Patient will increase their Functional Gait Assessment to 24/30 to decrease their risk for falls.        Start:  09/10/24    Expected End:  12/08/24            Patient Stated Goal 1:to feel less imbalance       Start:  09/10/24    Expected End:  12/08/24

## 2024-10-03 ENCOUNTER — TREATMENT (OUTPATIENT)
Dept: PHYSICAL THERAPY | Facility: CLINIC | Age: 79
End: 2024-10-03
Payer: MEDICARE

## 2024-10-03 DIAGNOSIS — H81.93 VESTIBULOPATHY OF BOTH EARS: ICD-10-CM

## 2024-10-03 DIAGNOSIS — R42 EPISODE OF DIZZINESS: ICD-10-CM

## 2024-10-03 DIAGNOSIS — R26.89 IMBALANCE: Primary | ICD-10-CM

## 2024-10-03 DIAGNOSIS — R29.898 WEAKNESS OF BOTH LOWER EXTREMITIES: ICD-10-CM

## 2024-10-03 PROCEDURE — 97110 THERAPEUTIC EXERCISES: CPT | Mod: GP | Performed by: PHYSICAL THERAPIST

## 2024-10-03 ASSESSMENT — PAIN SCALES - GENERAL: PAINLEVEL_OUTOF10: 0 - NO PAIN

## 2024-10-03 ASSESSMENT — PAIN - FUNCTIONAL ASSESSMENT: PAIN_FUNCTIONAL_ASSESSMENT: 0-10

## 2024-10-05 DIAGNOSIS — N40.1 BENIGN PROSTATIC HYPERPLASIA WITH LOWER URINARY TRACT SYMPTOMS, SYMPTOM DETAILS UNSPECIFIED: ICD-10-CM

## 2024-10-06 NOTE — PROGRESS NOTES
"Physical Therapy    Physical Therapy Treatment    Patient Name: Cristy Forte  MRN: [id number]  Today's Date: 10/7/2024    Time Entry:   Time Calculation  Start Time: 1030  Stop Time: 1115  Time Calculation (min): 45 min     PT Therapeutic Procedures Time Entry  Therapeutic Exercise Time Entry: 40                 Visit number: 5  Insurance:   POC: end 12/8/2024  Primary diagnosis: imbalance    Assessment:     Patient with decreased leg pain after stretches.  Reporting pain at soreness at end of session.    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      Precautions  Precautions  STEADI Fall Risk Score (The score of 4 or more indicates an increased risk of falling): 4    Pain  Pain Assessment  Pain Assessment: 0-10  0-10 (Numeric) Pain Score: 4 (both legs in hamstring and calf)    Objective   Treatments:  10/7/2024:  Therapeutic exercises: 40 min    Seated hamstring stretch 3 times 30\" * 3 position  Calf stretch 3 positions 3 times 30\" *    10/3/2024:  Therapeutic exercises: 40 min   Lateral cervical flexion 3 times 30\" *  Levator scap 3 times 30\" *  Supine chin tuck 10 times 1 set *  Supine scapular retraction 10 times 1 set *     9/26/2024:  Step ups forward 10 times 1 set * 6\"  Lateral step ups 10 times 1 set * 6\"  Step down 10 times 1 set * 6\"  Step quad stretch 3 times 30\" *  Knee flexion seated 3 times 30\" *   Seated hip abd blue 10 times 1 set *     9/19/2024:  Therapeutic exercises: 15 min    Supine quad stretch 3 times 30\" *    Neuro re ed 25 min  1/2 Tandem EO 30 seconds*  1/2 Tandem EO Horizontal head turns 10 times 1 set *   1/2 Tandem EO Vertical head " turns 10 times 1 set *   1/2 Tandem EC 30 seconds *    Patient cues to feel his feet when doing exercises in corner and do not allow pressure to change with head motions or with eyes closed as well as to correct upon first noticing change of position.    EVAL:  1/2 Tandem EO 30 seconds*  1/2 Tandem EO Horizontal head turns 10 times 1 set *   1/2 Tandem EO Vertical head turns 10 times 1 set *   1/2 Tandem EC 30 seconds *     Cues to use sensation of touch to maintain balance with EC to minimize postural sway and decrease tendency to loose balance    OP EDUCATION:       Goals:  Active       PT Problem       PT Goal 1:  Patient decrease DHI to 10 points for self reported improvement in function.        Start:  09/10/24    Expected End:  12/08/24            PT Goal 2:  Patient will increase hip flexion strength to 4-/5 to allow increased ease in community ambulation and stair negotiation.        Start:  09/10/24    Expected End:  12/08/24            PT Goal 3:  Patient will increase ankle DF to 5 degrees to improve gait mechanics and increase ease in ankle strategies.        Start:  09/10/24    Expected End:  12/08/24            PT Goal 4:  Patient will decrease their TUG cognitive time to 12 decrease the patient fall risk.        Start:  09/10/24    Expected End:  12/08/24            PT Goal 5:  Patient will increase their Functional Gait Assessment to 24/30 to decrease their risk for falls.        Start:  09/10/24    Expected End:  12/08/24            Patient Stated Goal 1:to feel less imbalance       Start:  09/10/24    Expected End:  12/08/24

## 2024-10-07 ENCOUNTER — TREATMENT (OUTPATIENT)
Dept: PHYSICAL THERAPY | Facility: CLINIC | Age: 79
End: 2024-10-07
Payer: MEDICARE

## 2024-10-07 DIAGNOSIS — R26.89 IMBALANCE: Primary | ICD-10-CM

## 2024-10-07 DIAGNOSIS — R42 EPISODE OF DIZZINESS: ICD-10-CM

## 2024-10-07 DIAGNOSIS — R29.898 WEAKNESS OF BOTH LOWER EXTREMITIES: ICD-10-CM

## 2024-10-07 DIAGNOSIS — H81.93 VESTIBULOPATHY OF BOTH EARS: ICD-10-CM

## 2024-10-07 PROCEDURE — 97110 THERAPEUTIC EXERCISES: CPT | Mod: GP | Performed by: PHYSICAL THERAPIST

## 2024-10-07 ASSESSMENT — PAIN - FUNCTIONAL ASSESSMENT: PAIN_FUNCTIONAL_ASSESSMENT: 0-10

## 2024-10-07 ASSESSMENT — PAIN SCALES - GENERAL: PAINLEVEL_OUTOF10: 4

## 2024-10-08 RX ORDER — FINASTERIDE 5 MG/1
5 TABLET, FILM COATED ORAL DAILY
Qty: 90 TABLET | Refills: 1 | Status: SHIPPED | OUTPATIENT
Start: 2024-10-08

## 2024-10-12 NOTE — PROGRESS NOTES
"Physical Therapy    Physical Therapy Treatment    Patient Name: Cristy Forte  MRN: [id number]  Today's Date: 10/15/2024    Time Entry:   Time Calculation  Start Time: 0845  Stop Time: 0930  Time Calculation (min): 45 min     PT Therapeutic Procedures Time Entry  Therapeutic Exercise Time Entry: 40                 Visit number: 6  Insurance:   POC: end 12/8/2024  Primary diagnosis: imbalance    Assessment:     Patient with continued report of muscle soreness but not at bad as last week.  Patient with good tolerance of new exercises.    Plan:     Add marching, lateral steps and cup taps.    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    Precautions  Precautions  STEADI Fall Risk Score (The score of 4 or more indicates an increased risk of falling): 4    Pain  Pain Assessment  Pain Assessment: 0-10  0-10 (Numeric) Pain Score: 5 - Moderate pain  Pain Location:  (bilateral feet)    Objective   Treatments:  10/14/2024:  Hip 3 planes red 10 times 1 set * Cues to do smaller leg kicks to decrease back pain    10/7/2024:  Therapeutic exercises: 40 min    Seated hamstring stretch 3 times 30\" * 3 position  Calf stretch 3 positions 3 times 30\" *    10/3/2024:  Therapeutic exercises: 40 min   Lateral cervical flexion 3 times 30\" *  Levator scap 3 times 30\" *  Supine chin tuck 10 times 1 set *  Supine scapular retraction 10 times 1 set *     9/26/2024:  Step ups forward 10 times 1 set * 6\"  Lateral step ups 10 times 1 set * 6\"  Step down 10 times 1 set * 6\"  Step quad stretch 3 times 30\" *  Knee flexion seated 3 times 30\" *   Seated hip abd blue 10 times 1 set *     9/19/2024:  Therapeutic exercises: 15 min  " "  Supine quad stretch 3 times 30\" *    Neuro re ed 25 min  1/2 Tandem EO 30 seconds*  1/2 Tandem EO Horizontal head turns 10 times 1 set *   1/2 Tandem EO Vertical head turns 10 times 1 set *   1/2 Tandem EC 30 seconds *    Patient cues to feel his feet when doing exercises in corner and do not allow pressure to change with head motions or with eyes closed as well as to correct upon first noticing change of position.    EVAL:  1/2 Tandem EO 30 seconds*  1/2 Tandem EO Horizontal head turns 10 times 1 set *   1/2 Tandem EO Vertical head turns 10 times 1 set *   1/2 Tandem EC 30 seconds *     Cues to use sensation of touch to maintain balance with EC to minimize postural sway and decrease tendency to loose balance    OP EDUCATION:     10/15/2024:  Patient diet in last week contained hot dogs, deli chicken salad and roasted frozen vegetables all with high sodium content    Goals:  Active       PT Problem       PT Goal 1:  Patient decrease DHI to 10 points for self reported improvement in function.        Start:  09/10/24    Expected End:  12/08/24            PT Goal 2:  Patient will increase hip flexion strength to 4-/5 to allow increased ease in community ambulation and stair negotiation.        Start:  09/10/24    Expected End:  12/08/24            PT Goal 3:  Patient will increase ankle DF to 5 degrees to improve gait mechanics and increase ease in ankle strategies.        Start:  09/10/24    Expected End:  12/08/24            PT Goal 4:  Patient will decrease their TUG cognitive time to 12 decrease the patient fall risk.        Start:  09/10/24    Expected End:  12/08/24            PT Goal 5:  Patient will increase their Functional Gait Assessment to 24/30 to decrease their risk for falls.        Start:  09/10/24    Expected End:  12/08/24            Patient Stated Goal 1:to feel less imbalance       Start:  09/10/24    Expected End:  12/08/24                      "

## 2024-10-15 ENCOUNTER — TREATMENT (OUTPATIENT)
Dept: PHYSICAL THERAPY | Facility: CLINIC | Age: 79
End: 2024-10-15
Payer: MEDICARE

## 2024-10-15 DIAGNOSIS — R26.89 IMBALANCE: Primary | ICD-10-CM

## 2024-10-15 DIAGNOSIS — R42 EPISODE OF DIZZINESS: ICD-10-CM

## 2024-10-15 DIAGNOSIS — H81.93 VESTIBULOPATHY OF BOTH EARS: ICD-10-CM

## 2024-10-15 DIAGNOSIS — R29.898 WEAKNESS OF BOTH LOWER EXTREMITIES: ICD-10-CM

## 2024-10-15 PROCEDURE — 97110 THERAPEUTIC EXERCISES: CPT | Mod: GP | Performed by: PHYSICAL THERAPIST

## 2024-10-15 ASSESSMENT — PAIN - FUNCTIONAL ASSESSMENT: PAIN_FUNCTIONAL_ASSESSMENT: 0-10

## 2024-10-15 ASSESSMENT — PAIN SCALES - GENERAL: PAINLEVEL_OUTOF10: 5 - MODERATE PAIN

## 2024-10-16 ENCOUNTER — APPOINTMENT (OUTPATIENT)
Dept: PRIMARY CARE | Facility: CLINIC | Age: 79
End: 2024-10-16
Payer: MEDICARE

## 2024-10-16 ENCOUNTER — LAB (OUTPATIENT)
Dept: LAB | Facility: LAB | Age: 79
End: 2024-10-16
Payer: COMMERCIAL

## 2024-10-16 VITALS — HEART RATE: 87 BPM | SYSTOLIC BLOOD PRESSURE: 106 MMHG | DIASTOLIC BLOOD PRESSURE: 66 MMHG | TEMPERATURE: 98 F

## 2024-10-16 DIAGNOSIS — Z23 IMMUNIZATION DUE: ICD-10-CM

## 2024-10-16 DIAGNOSIS — E11.21 CONTROLLED TYPE 2 DIABETES MELLITUS WITH DIABETIC NEPHROPATHY, WITHOUT LONG-TERM CURRENT USE OF INSULIN: ICD-10-CM

## 2024-10-16 DIAGNOSIS — Z00.00 MEDICARE ANNUAL WELLNESS VISIT, SUBSEQUENT: Primary | ICD-10-CM

## 2024-10-16 DIAGNOSIS — Z12.11 COLON CANCER SCREENING: ICD-10-CM

## 2024-10-16 DIAGNOSIS — Z00.00 MEDICARE ANNUAL WELLNESS VISIT, SUBSEQUENT: ICD-10-CM

## 2024-10-16 DIAGNOSIS — Z12.5 PROSTATE CANCER SCREENING: ICD-10-CM

## 2024-10-16 DIAGNOSIS — I10 PRIMARY HYPERTENSION: ICD-10-CM

## 2024-10-16 LAB
ALBUMIN SERPL BCP-MCNC: 4.2 G/DL (ref 3.4–5)
ALP SERPL-CCNC: 76 U/L (ref 33–136)
ALT SERPL W P-5'-P-CCNC: 28 U/L (ref 10–52)
ANION GAP SERPL CALC-SCNC: 15 MMOL/L (ref 10–20)
AST SERPL W P-5'-P-CCNC: 26 U/L (ref 9–39)
BILIRUB SERPL-MCNC: 0.7 MG/DL (ref 0–1.2)
BUN SERPL-MCNC: 20 MG/DL (ref 6–23)
CALCIUM SERPL-MCNC: 9.2 MG/DL (ref 8.6–10.6)
CHLORIDE SERPL-SCNC: 102 MMOL/L (ref 98–107)
CHOLEST SERPL-MCNC: 95 MG/DL (ref 0–199)
CHOLESTEROL/HDL RATIO: 2.4
CO2 SERPL-SCNC: 27 MMOL/L (ref 21–32)
CREAT SERPL-MCNC: 1.08 MG/DL (ref 0.5–1.3)
CREAT UR-MCNC: 106.2 MG/DL (ref 20–370)
EGFRCR SERPLBLD CKD-EPI 2021: 71 ML/MIN/1.73M*2
ERYTHROCYTE [DISTWIDTH] IN BLOOD BY AUTOMATED COUNT: 15.5 % (ref 11.5–14.5)
EST. AVERAGE GLUCOSE BLD GHB EST-MCNC: 126 MG/DL
GLUCOSE SERPL-MCNC: 91 MG/DL (ref 74–99)
HBA1C MFR BLD: 6 %
HCT VFR BLD AUTO: 42 % (ref 41–52)
HDLC SERPL-MCNC: 39.3 MG/DL
HGB BLD-MCNC: 12.8 G/DL (ref 13.5–17.5)
LDLC SERPL CALC-MCNC: 39 MG/DL
MCH RBC QN AUTO: 25.2 PG (ref 26–34)
MCHC RBC AUTO-ENTMCNC: 30.5 G/DL (ref 32–36)
MCV RBC AUTO: 83 FL (ref 80–100)
MICROALBUMIN UR-MCNC: <7 MG/L
MICROALBUMIN/CREAT UR: NORMAL MG/G{CREAT}
NON HDL CHOLESTEROL: 56 MG/DL (ref 0–149)
NRBC BLD-RTO: 0 /100 WBCS (ref 0–0)
PLATELET # BLD AUTO: 158 X10*3/UL (ref 150–450)
POTASSIUM SERPL-SCNC: 4 MMOL/L (ref 3.5–5.3)
PROT SERPL-MCNC: 7 G/DL (ref 6.4–8.2)
RBC # BLD AUTO: 5.08 X10*6/UL (ref 4.5–5.9)
SODIUM SERPL-SCNC: 140 MMOL/L (ref 136–145)
TRIGL SERPL-MCNC: 84 MG/DL (ref 0–149)
TSH SERPL-ACNC: 2.19 MIU/L (ref 0.44–3.98)
VLDL: 17 MG/DL (ref 0–40)
WBC # BLD AUTO: 5.7 X10*3/UL (ref 4.4–11.3)

## 2024-10-16 PROCEDURE — 84443 ASSAY THYROID STIM HORMONE: CPT

## 2024-10-16 PROCEDURE — 80053 COMPREHEN METABOLIC PANEL: CPT

## 2024-10-16 PROCEDURE — 36415 COLL VENOUS BLD VENIPUNCTURE: CPT

## 2024-10-16 PROCEDURE — G0103 PSA SCREENING: HCPCS

## 2024-10-16 PROCEDURE — 80061 LIPID PANEL: CPT

## 2024-10-16 PROCEDURE — 82570 ASSAY OF URINE CREATININE: CPT

## 2024-10-16 PROCEDURE — 85027 COMPLETE CBC AUTOMATED: CPT

## 2024-10-16 PROCEDURE — 84154 ASSAY OF PSA FREE: CPT

## 2024-10-16 PROCEDURE — 83036 HEMOGLOBIN GLYCOSYLATED A1C: CPT

## 2024-10-16 PROCEDURE — 82043 UR ALBUMIN QUANTITATIVE: CPT

## 2024-10-16 RX ORDER — LOSARTAN POTASSIUM 100 MG/1
100 TABLET ORAL DAILY
Qty: 90 TABLET | Refills: 1 | Status: SHIPPED | OUTPATIENT
Start: 2024-10-16

## 2024-10-16 ASSESSMENT — ACTIVITIES OF DAILY LIVING (ADL)
DOING_HOUSEWORK: INDEPENDENT
TAKING_MEDICATION: INDEPENDENT
BATHING: INDEPENDENT
GROCERY_SHOPPING: INDEPENDENT
MANAGING_FINANCES: INDEPENDENT
DRESSING: INDEPENDENT

## 2024-10-16 ASSESSMENT — ENCOUNTER SYMPTOMS
LOSS OF SENSATION IN FEET: 0
OCCASIONAL FEELINGS OF UNSTEADINESS: 0
DEPRESSION: 0

## 2024-10-16 NOTE — ASSESSMENT & PLAN NOTE
-immunization: Shingrix and Tdap vaccines recommended.  -blood test due.  -colonoscopy 12/13/2021: no repeat due to age.

## 2024-10-16 NOTE — PROGRESS NOTES
Subjective   Patient ID: Britta Fan is a 78 y.o. male who presents for Medicare Annual Wellness Visit Initial.    HPI    BRITTA FAN is a 78 year old Male, with a PMH of HTN, BPH, CAD s/p stenting, afib on xarelto, s/p AVR, right cervical radiculopathy,  lumbar radiculopathy, s/p Bilateral C7 pedicle screw fractures, provoked multiple pulmonary embolism s/p TKA 2010, GERD, ED, adrenal adenoma, here for :    1- MWV.  -immunization: Shingrix and Tdap vaccines recommended.  -blood test due.  -colonoscopy 12/13/2021: no repeat due to age.     2- HTN follow-up. His home BP remains low despite the recent decrease of his Losartan dose done during the last visit.     A review of system was completed.  All systems were reviewed and were normal, except for the ones that are listed in the HPI.    Objective   Physical Exam  Constitutional:       Appearance: Normal appearance.   HENT:      Head: Normocephalic and atraumatic.      Right Ear: Tympanic membrane, ear canal and external ear normal.      Left Ear: Tympanic membrane, ear canal and external ear normal.      Nose: Nose normal.      Mouth/Throat:      Mouth: Mucous membranes are moist.      Pharynx: Oropharynx is clear.   Eyes:      Extraocular Movements: Extraocular movements intact.      Conjunctiva/sclera: Conjunctivae normal.      Pupils: Pupils are equal, round, and reactive to light.   Cardiovascular:      Rate and Rhythm: Normal rate and regular rhythm.      Pulses: Normal pulses.   Pulmonary:      Effort: Pulmonary effort is normal.      Breath sounds: Normal breath sounds.   Abdominal:      General: Abdomen is flat. Bowel sounds are normal.      Palpations: Abdomen is soft.   Musculoskeletal:         General: Normal range of motion.      Cervical back: Normal range of motion and neck supple.   Skin:     General: Skin is warm.   Neurological:      General: No focal deficit present.      Mental Status: He is alert and oriented to person, place, and  time. Mental status is at baseline.   Psychiatric:         Mood and Affect: Mood normal.         Behavior: Behavior normal.         Thought Content: Thought content normal.         Judgment: Judgment normal.     Assessment/Plan   Problem List Items Addressed This Visit       Hypertension     -Stop spironolactone 25 mg every day today.  BP runs in the low normal range.  -Amlodipine stopped 2 months ago.  -Hydrochlorothiazide 25 mg every day stopped today per patient's request.  -Chlorthalidone 25 mg every day resumed today per patient's request.    -Losartan 100 mg every day resumed today.          Relevant Medications    losartan (Cozaar) 100 mg tablet    Prostate cancer screening    Relevant Orders    PSA, Total and Free    Medicare annual wellness visit, subsequent - Primary     -immunization: Shingrix and Tdap vaccines recommended.  -blood test due.  -colonoscopy 12/13/2021: no repeat due to age.            Relevant Orders    Hemoglobin A1C    Lipid Panel    Albumin-Creatinine Ratio, Urine Random    Comprehensive Metabolic Panel    CBC    TSH with reflex to Free T4 if abnormal    PSA, Total and Free    Immunization due    Controlled type 2 diabetes mellitus with diabetic nephropathy, without long-term current use of insulin    Relevant Orders    Hemoglobin A1C    Lipid Panel    Albumin-Creatinine Ratio, Urine Random    Comprehensive Metabolic Panel    CBC    TSH with reflex to Free T4 if abnormal    Colon cancer screening    Patient to return to office in 3 months for reassessment.

## 2024-10-17 NOTE — RESULT ENCOUNTER NOTE
Patient's blood test shows that his elevated blood sugar level is improved.  He should continue a strict low sugar/low carbohydrate diet, weight loss and increased exercise.  His cell count showed signs of a slightly anemia.  Unchanged compared to previous readings.  Will continue to monitor.  Nothing to be concerned at this time.  The rest of the blood and urine test were normal.

## 2024-10-18 ENCOUNTER — TELEPHONE (OUTPATIENT)
Dept: PRIMARY CARE | Facility: CLINIC | Age: 79
End: 2024-10-18
Payer: COMMERCIAL

## 2024-10-18 LAB
PSA FREE MFR SERPL: 25 %
PSA FREE SERPL-MCNC: 0.3 NG/ML
PSA SERPL IA-MCNC: 1.2 NG/ML (ref 0–4)

## 2024-10-21 ENCOUNTER — TREATMENT (OUTPATIENT)
Dept: PHYSICAL THERAPY | Facility: CLINIC | Age: 79
End: 2024-10-21
Payer: MEDICARE

## 2024-10-21 ENCOUNTER — APPOINTMENT (OUTPATIENT)
Dept: NEUROLOGY | Facility: CLINIC | Age: 79
End: 2024-10-21
Payer: MEDICARE

## 2024-10-21 DIAGNOSIS — R26.89 IMBALANCE: Primary | ICD-10-CM

## 2024-10-21 DIAGNOSIS — R42 EPISODE OF DIZZINESS: ICD-10-CM

## 2024-10-21 DIAGNOSIS — H81.93 VESTIBULOPATHY OF BOTH EARS: ICD-10-CM

## 2024-10-21 DIAGNOSIS — R29.898 WEAKNESS OF BOTH LOWER EXTREMITIES: ICD-10-CM

## 2024-10-21 PROCEDURE — 97112 NEUROMUSCULAR REEDUCATION: CPT | Mod: GP | Performed by: PHYSICAL THERAPIST

## 2024-10-21 ASSESSMENT — PAIN SCALES - GENERAL: PAINLEVEL_OUTOF10: 3

## 2024-10-21 ASSESSMENT — PAIN - FUNCTIONAL ASSESSMENT: PAIN_FUNCTIONAL_ASSESSMENT: 0-10

## 2024-10-22 ENCOUNTER — TELEPHONE (OUTPATIENT)
Dept: PRIMARY CARE | Facility: CLINIC | Age: 79
End: 2024-10-22
Payer: MEDICARE

## 2024-10-25 DIAGNOSIS — I10 PRIMARY HYPERTENSION: ICD-10-CM

## 2024-10-25 RX ORDER — POTASSIUM CHLORIDE 20 MEQ/1
20 TABLET, EXTENDED RELEASE ORAL DAILY
Qty: 60 TABLET | Refills: 5 | Status: SHIPPED | OUTPATIENT
Start: 2024-10-25

## 2024-10-28 DIAGNOSIS — Z79.4 TYPE 2 DIABETES MELLITUS WITH OTHER SPECIFIED COMPLICATION, WITH LONG-TERM CURRENT USE OF INSULIN: Primary | ICD-10-CM

## 2024-10-28 DIAGNOSIS — E11.69 TYPE 2 DIABETES MELLITUS WITH OTHER SPECIFIED COMPLICATION, WITH LONG-TERM CURRENT USE OF INSULIN: Primary | ICD-10-CM

## 2024-10-29 RX ORDER — METFORMIN HYDROCHLORIDE 750 MG/1
750 TABLET, EXTENDED RELEASE ORAL
Qty: 90 TABLET | Refills: 1 | Status: SHIPPED | OUTPATIENT
Start: 2024-10-29 | End: 2025-10-29

## 2024-11-01 NOTE — PROGRESS NOTES
"Physical Therapy    Physical Therapy Treatment    Patient Name: Cristy Forte  MRN: [id number]  Today's Date: 11/4/2024    Time Entry:   Time Calculation  Start Time: 0900  Stop Time: 0945  Time Calculation (min): 45 min     PT Therapeutic Procedures Time Entry  Neuromuscular Re-Education Time Entry: 40                 Visit number: 8  Insurance:   POC: end 12/8/2024  Primary diagnosis: imbalance    Assessment:     Patient with improved weight shift, and improved awareness of how far he needs to move for full weight shift.    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  Patient states that his swelling is coming and going    Precautions  Precautions  STEADI Fall Risk Score (The score of 4 or more indicates an increased risk of falling): 4    Pain  Pain Assessment  Pain Assessment: 0-10  0-10 (Numeric) Pain Score:  (5-6 low back pain from raking leaves)    Objective   Treatments:  11/4/2024:  Neuro: 40    Marching cues for weight shift from the COG and not using a shoulder drop.    Lateral steps cues for not ER lead leg and to bring both feet together.    10/21/2024:  Cup taps 10 times 1 set * cues for increased lateral weight shift and holding, shift from the hip.  Patient cues to shift as far as he thinks he needs and the go a little further.    10/14/2024:  Hip 3 planes red 10 times 1 set * Cues to do smaller leg kicks to decrease back pain    10/7/2024:  Therapeutic exercises: 40 min    Seated hamstring stretch 3 times 30\" * 3 position  Calf stretch 3 positions 3 times 30\" *    10/3/2024:  Therapeutic exercises: 40 min " "  Lateral cervical flexion 3 times 30\" *  Levator scap 3 times 30\" *  Supine chin tuck 10 times 1 set *  Supine scapular retraction 10 times 1 set *     9/26/2024:  Step ups forward 10 times 1 set * 6\"  Lateral step ups 10 times 1 set * 6\"  Step down 10 times 1 set * 6\"  Step quad stretch 3 times 30\" *  Knee flexion seated 3 times 30\" *   Seated hip abd blue 10 times 1 set *     9/19/2024:  Therapeutic exercises: 15 min    Supine quad stretch 3 times 30\" *    Neuro re ed 25 min  1/2 Tandem EO 30 seconds*  1/2 Tandem EO Horizontal head turns 10 times 1 set *   1/2 Tandem EO Vertical head turns 10 times 1 set *   1/2 Tandem EC 30 seconds *    Patient cues to feel his feet when doing exercises in corner and do not allow pressure to change with head motions or with eyes closed as well as to correct upon first noticing change of position.    EVAL:  1/2 Tandem EO 30 seconds*  1/2 Tandem EO Horizontal head turns 10 times 1 set *   1/2 Tandem EO Vertical head turns 10 times 1 set *   1/2 Tandem EC 30 seconds *     Cues to use sensation of touch to maintain balance with EC to minimize postural sway and decrease tendency to loose balance    OP EDUCATION:     10/15/2024:  Patient diet in last week contained hot dogs, deli chicken salad and roasted frozen vegetables all with high sodium content    Goals:  Active       PT Problem       PT Goal 1:  Patient decrease DHI to 10 points for self reported improvement in function.        Start:  09/10/24    Expected End:  12/08/24            PT Goal 2:  Patient will increase hip flexion strength to 4-/5 to allow increased ease in community ambulation and stair negotiation.        Start:  09/10/24    Expected End:  12/08/24            PT Goal 3:  Patient will increase ankle DF to 5 degrees to improve gait mechanics and increase ease in ankle strategies.        Start:  09/10/24    Expected End:  12/08/24            PT Goal 4:  Patient will decrease their TUG cognitive time to 12 decrease " the patient fall risk.        Start:  09/10/24    Expected End:  12/08/24            PT Goal 5:  Patient will increase their Functional Gait Assessment to 24/30 to decrease their risk for falls.        Start:  09/10/24    Expected End:  12/08/24            Patient Stated Goal 1:to feel less imbalance       Start:  09/10/24    Expected End:  12/08/24

## 2024-11-04 ENCOUNTER — TREATMENT (OUTPATIENT)
Dept: PHYSICAL THERAPY | Facility: CLINIC | Age: 79
End: 2024-11-04
Payer: MEDICARE

## 2024-11-04 DIAGNOSIS — R29.898 WEAKNESS OF BOTH LOWER EXTREMITIES: ICD-10-CM

## 2024-11-04 DIAGNOSIS — R26.89 IMBALANCE: Primary | ICD-10-CM

## 2024-11-04 DIAGNOSIS — R42 EPISODE OF DIZZINESS: ICD-10-CM

## 2024-11-04 DIAGNOSIS — H81.93 VESTIBULOPATHY OF BOTH EARS: ICD-10-CM

## 2024-11-04 PROCEDURE — 97112 NEUROMUSCULAR REEDUCATION: CPT | Mod: GP | Performed by: PHYSICAL THERAPIST

## 2024-11-04 ASSESSMENT — PAIN - FUNCTIONAL ASSESSMENT: PAIN_FUNCTIONAL_ASSESSMENT: 0-10

## 2024-11-15 DIAGNOSIS — I10 PRIMARY HYPERTENSION: ICD-10-CM

## 2024-11-15 DIAGNOSIS — I25.83 CORONARY ATHEROSCLEROSIS DUE TO LIPID RICH PLAQUE: ICD-10-CM

## 2024-11-15 RX ORDER — CHLORTHALIDONE 25 MG/1
25 TABLET ORAL DAILY
Qty: 90 TABLET | Refills: 1 | Status: SHIPPED | OUTPATIENT
Start: 2024-11-15

## 2024-11-15 RX ORDER — ATORVASTATIN CALCIUM 80 MG/1
80 TABLET, FILM COATED ORAL DAILY
Qty: 90 TABLET | Refills: 1 | Status: SHIPPED | OUTPATIENT
Start: 2024-11-15

## 2024-11-20 DIAGNOSIS — K21.9 GERD WITHOUT ESOPHAGITIS: ICD-10-CM

## 2024-11-20 RX ORDER — PANTOPRAZOLE SODIUM 40 MG/1
TABLET, DELAYED RELEASE ORAL
Qty: 90 TABLET | Refills: 1 | Status: SHIPPED | OUTPATIENT
Start: 2024-11-20

## 2024-11-25 NOTE — PROGRESS NOTES
Physical Therapy    Physical Therapy Treatment    Patient Name: Cristy Forte  MRN: [id number]  Today's Date: 11/29/2024    Time Entry:   Time Calculation  Start Time: 0945  Stop Time: 1030  Time Calculation (min): 45 min     PT Therapeutic Procedures Time Entry  Therapeutic Exercise Time Entry: 40                 Visit number: 9  Insurance:   POC: end 12/8/2024  Primary diagnosis: imbalance    Assessment:     Patient with improved understanding of importance of posture to manage his symptoms    Plan:     REASSESSMENT    Current Problem  Problem List Items Addressed This Visit             ICD-10-CM    Episode of dizziness R42    Imbalance - Primary R26.89    Neck stiffness M43.6    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  Patient reports that his heavy headed, intoxicated feeling.  Feet swollen and hurt.    Precautions  Precautions  STEADI Fall Risk Score (The score of 4 or more indicates an increased risk of falling): 4    Pain  Pain Assessment  Pain Assessment: 0-10  0-10 (Numeric) Pain Score:  (feet hurt 7/10)    Objective   Treatments:  11/29/204:  Therapeutic exercises: 40 min    Posture correction with towel roll  Posture correction in standing  Marching  Retro walk cues for keeping weight to toe and not to lean on his heels    11/4/2024:  Neuro: 40    Marching cues for weight shift from the COG and not using a shoulder drop.    Lateral steps cues for not ER lead leg and to bring both feet together.    10/21/2024:  Cup taps 10 times 1 set * cues for increased lateral weight shift and holding, shift from the hip.  Patient cues to shift as far as he thinks he needs and the go a little further.    10/14/2024:  Hip 3 planes red 10 times 1 set * Cues to do smaller leg kicks to decrease back pain    10/7/2024:  Therapeutic exercises: 40 min    Seated hamstring  "stretch 3 times 30\" * 3 position  Calf stretch 3 positions 3 times 30\" *    10/3/2024:  Therapeutic exercises: 40 min   Lateral cervical flexion 3 times 30\" *  Levator scap 3 times 30\" *  Supine chin tuck 10 times 1 set *  Supine scapular retraction 10 times 1 set *     9/26/2024:  Step ups forward 10 times 1 set * 6\"  Lateral step ups 10 times 1 set * 6\"  Step down 10 times 1 set * 6\"  Step quad stretch 3 times 30\" *  Knee flexion seated 3 times 30\" *   Seated hip abd blue 10 times 1 set *     9/19/2024:  Therapeutic exercises: 15 min    Supine quad stretch 3 times 30\" *    Neuro re ed 25 min  1/2 Tandem EO 30 seconds*  1/2 Tandem EO Horizontal head turns 10 times 1 set *   1/2 Tandem EO Vertical head turns 10 times 1 set *   1/2 Tandem EC 30 seconds *    Patient cues to feel his feet when doing exercises in corner and do not allow pressure to change with head motions or with eyes closed as well as to correct upon first noticing change of position.    EVAL:  1/2 Tandem EO 30 seconds*  1/2 Tandem EO Horizontal head turns 10 times 1 set *   1/2 Tandem EO Vertical head turns 10 times 1 set *   1/2 Tandem EC 30 seconds *     Cues to use sensation of touch to maintain balance with EC to minimize postural sway and decrease tendency to loose balance    OP EDUCATION:     10/15/2024:  Patient diet in last week contained hot dogs, deli chicken salad and roasted frozen vegetables all with high sodium content    Goals:  Active       PT Problem       PT Goal 1:  Patient decrease DHI to 10 points for self reported improvement in function.        Start:  09/10/24    Expected End:  12/08/24            PT Goal 2:  Patient will increase hip flexion strength to 4-/5 to allow increased ease in community ambulation and stair negotiation.        Start:  09/10/24    Expected End:  12/08/24            PT Goal 3:  Patient will increase ankle DF to 5 degrees to improve gait mechanics and increase ease in ankle strategies.        Start:  " 09/10/24    Expected End:  12/08/24            PT Goal 4:  Patient will decrease their TUG cognitive time to 12 decrease the patient fall risk.        Start:  09/10/24    Expected End:  12/08/24            PT Goal 5:  Patient will increase their Functional Gait Assessment to 24/30 to decrease their risk for falls.        Start:  09/10/24    Expected End:  12/08/24            Patient Stated Goal 1:to feel less imbalance       Start:  09/10/24    Expected End:  12/08/24

## 2024-11-29 ENCOUNTER — TREATMENT (OUTPATIENT)
Dept: PHYSICAL THERAPY | Facility: CLINIC | Age: 79
End: 2024-11-29
Payer: MEDICARE

## 2024-11-29 DIAGNOSIS — M43.6 NECK STIFFNESS: ICD-10-CM

## 2024-11-29 DIAGNOSIS — R42 EPISODE OF DIZZINESS: ICD-10-CM

## 2024-11-29 DIAGNOSIS — R26.89 IMBALANCE: Primary | ICD-10-CM

## 2024-11-29 DIAGNOSIS — R29.898 WEAKNESS OF BOTH LOWER EXTREMITIES: ICD-10-CM

## 2024-11-29 DIAGNOSIS — H81.93 VESTIBULOPATHY OF BOTH EARS: ICD-10-CM

## 2024-11-29 PROCEDURE — 97110 THERAPEUTIC EXERCISES: CPT | Mod: GP | Performed by: PHYSICAL THERAPIST

## 2024-11-29 ASSESSMENT — PAIN - FUNCTIONAL ASSESSMENT: PAIN_FUNCTIONAL_ASSESSMENT: 0-10

## 2024-12-01 NOTE — PROGRESS NOTES
"Physical Therapy    Physical Therapy Treatment    Patient Name: Cristy Forte  MRN: [id number]  Today's Date: 12/6/2024    Time Entry:                           Visit number: Visit count could not be calculated. Make sure you are using a visit which is associated with an episode.  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  Patient reports that his heavy headed, intoxicated feeling.  Feet swollen and hurt.    Precautions       Pain       Objective   Treatments:  11/29/204:  Therapeutic exercises: 40 min    Posture correction with towel roll  Posture correction in standing  Marching  Retro walk cues for keeping weight to toe and not to lean on his heels    11/4/2024:  Neuro: 40    Marching cues for weight shift from the COG and not using a shoulder drop.    Lateral steps cues for not ER lead leg and to bring both feet together.    10/21/2024:  Cup taps 10 times 1 set * cues for increased lateral weight shift and holding, shift from the hip.  Patient cues to shift as far as he thinks he needs and the go a little further.    10/14/2024:  Hip 3 planes red 10 times 1 set * Cues to do smaller leg kicks to decrease back pain    10/7/2024:  Therapeutic exercises: 40 min    Seated hamstring stretch 3 times 30\" * 3 position  Calf stretch 3 positions 3 times 30\" *    10/3/2024:  Therapeutic exercises: 40 min   Lateral cervical flexion 3 times 30\" *  Levator scap 3 times 30\" *  Supine chin tuck 10 times 1 set *  Supine " "scapular retraction 10 times 1 set *     9/26/2024:  Step ups forward 10 times 1 set * 6\"  Lateral step ups 10 times 1 set * 6\"  Step down 10 times 1 set * 6\"  Step quad stretch 3 times 30\" *  Knee flexion seated 3 times 30\" *   Seated hip abd blue 10 times 1 set *     9/19/2024:  Therapeutic exercises: 15 min    Supine quad stretch 3 times 30\" *    Neuro re ed 25 min  1/2 Tandem EO 30 seconds*  1/2 Tandem EO Horizontal head turns 10 times 1 set *   1/2 Tandem EO Vertical head turns 10 times 1 set *   1/2 Tandem EC 30 seconds *    Patient cues to feel his feet when doing exercises in corner and do not allow pressure to change with head motions or with eyes closed as well as to correct upon first noticing change of position.    EVAL:  1/2 Tandem EO 30 seconds*  1/2 Tandem EO Horizontal head turns 10 times 1 set *   1/2 Tandem EO Vertical head turns 10 times 1 set *   1/2 Tandem EC 30 seconds *     Cues to use sensation of touch to maintain balance with EC to minimize postural sway and decrease tendency to loose balance    OP EDUCATION:     10/15/2024:  Patient diet in last week contained hot dogs, deli chicken salad and roasted frozen vegetables all with high sodium content    Goals:                    "

## 2024-12-04 ENCOUNTER — OFFICE VISIT (OUTPATIENT)
Dept: CARDIOLOGY | Facility: HOSPITAL | Age: 79
End: 2024-12-04
Payer: MEDICARE

## 2024-12-04 VITALS
OXYGEN SATURATION: 97 % | HEART RATE: 93 BPM | BODY MASS INDEX: 33.93 KG/M2 | HEIGHT: 70 IN | WEIGHT: 237 LBS | SYSTOLIC BLOOD PRESSURE: 130 MMHG | DIASTOLIC BLOOD PRESSURE: 72 MMHG

## 2024-12-04 DIAGNOSIS — I10 PRIMARY HYPERTENSION: Primary | ICD-10-CM

## 2024-12-04 DIAGNOSIS — I48.91 ATRIAL FIBRILLATION, UNSPECIFIED TYPE (MULTI): ICD-10-CM

## 2024-12-04 PROCEDURE — 1159F MED LIST DOCD IN RCRD: CPT | Performed by: NURSE PRACTITIONER

## 2024-12-04 PROCEDURE — 1160F RVW MEDS BY RX/DR IN RCRD: CPT | Performed by: NURSE PRACTITIONER

## 2024-12-04 PROCEDURE — 93005 ELECTROCARDIOGRAM TRACING: CPT | Performed by: NURSE PRACTITIONER

## 2024-12-04 PROCEDURE — 3078F DIAST BP <80 MM HG: CPT | Performed by: NURSE PRACTITIONER

## 2024-12-04 PROCEDURE — 93010 ELECTROCARDIOGRAM REPORT: CPT | Performed by: INTERNAL MEDICINE

## 2024-12-04 PROCEDURE — 99214 OFFICE O/P EST MOD 30 MIN: CPT | Performed by: NURSE PRACTITIONER

## 2024-12-04 PROCEDURE — 1036F TOBACCO NON-USER: CPT | Performed by: NURSE PRACTITIONER

## 2024-12-04 PROCEDURE — 3075F SYST BP GE 130 - 139MM HG: CPT | Performed by: NURSE PRACTITIONER

## 2024-12-04 RX ORDER — SPIRONOLACTONE 25 MG/1
25 TABLET ORAL DAILY
Qty: 30 TABLET | Refills: 11 | Status: SHIPPED | OUTPATIENT
Start: 2024-12-04 | End: 2025-12-04

## 2024-12-04 ASSESSMENT — ENCOUNTER SYMPTOMS
CONSTITUTIONAL NEGATIVE: 1
RESPIRATORY NEGATIVE: 1

## 2024-12-04 NOTE — PROGRESS NOTES
Subjective   Cristy Forte is a 78 y.o. male.    Chief Complaint:  Ankle swelling    HPI  Mr. Forte is seen for an interim visit.  He is seen in collaboration with Dr. Giraldo.  Patient called requesting a visit due to swelling in his ankles bilaterally.  He has had some recent changes to his antihypertensive medications due to low blood pressure and is currently not taking spironolactone, HCTZ, or Norvasc.  His blood pressure has increased in fact is elevated on occasion and he has noticed ankle edema over the past few weeks.  He has been watching his salt and fluid intake since seeing his PCP.  He appears compliant with all medication.  He also notes that he was recently started on metformin.  He has no other concerns today.      Review of Systems   Constitutional: Negative.   Cardiovascular:  Positive for leg swelling.   Respiratory: Negative.     All other systems reviewed and are negative.      Objective   Vitals reviewed.   Constitutional:       Appearance: Healthy appearance. Not in distress.   Neck:      Vascular: No JVR. JVD normal.   Pulmonary:      Effort: Pulmonary effort is normal.      Breath sounds: Normal breath sounds. No wheezing. No rhonchi. No rales.   Chest:      Chest wall: Not tender to palpatation.   Cardiovascular:      Normal rate. Regular rhythm. Normal S1. Normal S2.       Murmurs: There is a grade 1/6 systolic murmur.      No gallop.  No click. No rub.   Edema:     Ankle: bilateral 1+ edema of the ankle.     Feet: bilateral trace edema of the feet.  Abdominal:      Tenderness: There is no abdominal tenderness.   Musculoskeletal: Normal range of motion.         General: No tenderness. Skin:     General: Skin is warm and dry.   Neurological:      General: No focal deficit present.      Mental Status: Alert and oriented to person, place and time.         Lab Review:   Lab Results   Component Value Date     10/16/2024    K 4.0 10/16/2024     10/16/2024    CO2 27  10/16/2024    BUN 20 10/16/2024    CREATININE 1.08 10/16/2024    GLUCOSE 91 10/16/2024    CALCIUM 9.2 10/16/2024     Lab Results   Component Value Date    WBC 5.7 10/16/2024    HGB 12.8 (L) 10/16/2024    HCT 42.0 10/16/2024    MCV 83 10/16/2024     10/16/2024     Lab Results   Component Value Date    CHOL 95 10/16/2024    TRIG 84 10/16/2024    HDL 39.3 10/16/2024     ECG obtained and reviewed, shows normal sinus rhythm with a ventricular rate of 93 bpm     Assessment/Plan   Mr. Forte is a pleasant 78-year-old -American gentleman with a history of hypertension hyperlipidemia, coronary artery disease status post remote LAD stenting with overlapping stents.  He also underwent aortic valve replacement 2018 for severe aortic stenosis.  Echocardiogram 9/2024 shows normal LV and RV function with LVEF of 80%.  There is an cavity of the obstruction.  There is no LV outflow tract obstruction observed.  There is mild MR and the bioprosthetic aortic valve has normal structure and function.  He presents for an interim visit today having called with some complaints of bilateral lower extremity edema.  He has had some changes to his antihypertensive regimen.  He notes that his blood pressure was low and over the past couple of months he has stopped HCTZ, amlodipine and spironolactone.  He remains on chlorthalidone.  ECG is stable today.  Blood pressure is mildly elevated.  He does have 1+ edema bilaterally.  He has been watching his salt and fluid intake.  We discussed continued salt and fluid restriction along with leg elevation and use of compression stockings.  We would also like him to resume spironolactone 25 mg daily in hopes of improving both his blood pressure and edema.  He will obtain a BMP in 10 to 14 days and follow-up in 4 to 5 weeks.  At that time we may need to further increase his antihypertensive regimen.  He knows to call with any interim concerns or questions.

## 2024-12-04 NOTE — PATIENT INSTRUCTIONS
Start spironolactone    Check labs before your appointment in 2 weeks    Continue all other medication    Call for concerns    Limit salt, wear compression stockings, elevate feet

## 2024-12-05 LAB
ATRIAL RATE: 93 BPM
P AXIS: 61 DEGREES
P OFFSET: 196 MS
P ONSET: 136 MS
PR INTERVAL: 174 MS
Q ONSET: 223 MS
QRS COUNT: 15 BEATS
QRS DURATION: 82 MS
QT INTERVAL: 382 MS
QTC CALCULATION(BAZETT): 474 MS
QTC FREDERICIA: 442 MS
R AXIS: -4 DEGREES
T AXIS: 59 DEGREES
T OFFSET: 414 MS
VENTRICULAR RATE: 93 BPM

## 2024-12-06 ENCOUNTER — APPOINTMENT (OUTPATIENT)
Dept: PHYSICAL THERAPY | Facility: CLINIC | Age: 79
End: 2024-12-06
Payer: MEDICARE

## 2024-12-07 NOTE — PROGRESS NOTES
"Physical Therapy    Physical Therapy Treatment    Patient Name: Cristy Forte  MRN: [id number]  Today's Date: 12/13/2024    Time Entry:                           Visit number: Visit count could not be calculated. Make sure you are using a visit which is associated with an episode.  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  Patient reports that his heavy headed, intoxicated feeling.  Feet swollen and hurt.    Precautions       Pain       Objective   Treatments:  11/29/204:  Therapeutic exercises: 40 min    Posture correction with towel roll  Posture correction in standing  Marching  Retro walk cues for keeping weight to toe and not to lean on his heels    11/4/2024:  Neuro: 40    Marching cues for weight shift from the COG and not using a shoulder drop.    Lateral steps cues for not ER lead leg and to bring both feet together.    10/21/2024:  Cup taps 10 times 1 set * cues for increased lateral weight shift and holding, shift from the hip.  Patient cues to shift as far as he thinks he needs and the go a little further.    10/14/2024:  Hip 3 planes red 10 times 1 set * Cues to do smaller leg kicks to decrease back pain    10/7/2024:  Therapeutic exercises: 40 min    Seated hamstring stretch 3 times 30\" * 3 position  Calf stretch 3 positions 3 times 30\" *    10/3/2024:  Therapeutic exercises: 40 min   Lateral cervical flexion 3 times 30\" *  Levator scap 3 times 30\" *  Supine chin tuck 10 times 1 set *  Supine scapular retraction 10 " "times 1 set *     9/26/2024:  Step ups forward 10 times 1 set * 6\"  Lateral step ups 10 times 1 set * 6\"  Step down 10 times 1 set * 6\"  Step quad stretch 3 times 30\" *  Knee flexion seated 3 times 30\" *   Seated hip abd blue 10 times 1 set *     9/19/2024:  Therapeutic exercises: 15 min    Supine quad stretch 3 times 30\" *    Neuro re ed 25 min  1/2 Tandem EO 30 seconds*  1/2 Tandem EO Horizontal head turns 10 times 1 set *   1/2 Tandem EO Vertical head turns 10 times 1 set *   1/2 Tandem EC 30 seconds *    Patient cues to feel his feet when doing exercises in corner and do not allow pressure to change with head motions or with eyes closed as well as to correct upon first noticing change of position.    EVAL:  1/2 Tandem EO 30 seconds*  1/2 Tandem EO Horizontal head turns 10 times 1 set *   1/2 Tandem EO Vertical head turns 10 times 1 set *   1/2 Tandem EC 30 seconds *     Cues to use sensation of touch to maintain balance with EC to minimize postural sway and decrease tendency to loose balance    OP EDUCATION:     10/15/2024:  Patient diet in last week contained hot dogs, deli chicken salad and roasted frozen vegetables all with high sodium content    Goals:                    "

## 2024-12-13 ENCOUNTER — APPOINTMENT (OUTPATIENT)
Dept: PHYSICAL THERAPY | Facility: CLINIC | Age: 79
End: 2024-12-13
Payer: MEDICARE

## 2024-12-13 DIAGNOSIS — R42 EPISODE OF DIZZINESS: ICD-10-CM

## 2024-12-13 DIAGNOSIS — H81.93 VESTIBULOPATHY OF BOTH EARS: ICD-10-CM

## 2024-12-13 DIAGNOSIS — R26.89 IMBALANCE: Primary | ICD-10-CM

## 2024-12-13 DIAGNOSIS — R29.898 WEAKNESS OF BOTH LOWER EXTREMITIES: ICD-10-CM

## 2024-12-30 ENCOUNTER — HOSPITAL ENCOUNTER (OUTPATIENT)
Facility: HOSPITAL | Age: 79
Setting detail: OBSERVATION
Discharge: HOME | End: 2025-01-01
Attending: EMERGENCY MEDICINE | Admitting: STUDENT IN AN ORGANIZED HEALTH CARE EDUCATION/TRAINING PROGRAM
Payer: MEDICARE

## 2024-12-30 ENCOUNTER — APPOINTMENT (OUTPATIENT)
Dept: CARDIOLOGY | Facility: HOSPITAL | Age: 79
End: 2024-12-30
Payer: MEDICARE

## 2024-12-30 ENCOUNTER — APPOINTMENT (OUTPATIENT)
Dept: RADIOLOGY | Facility: HOSPITAL | Age: 79
End: 2024-12-30
Payer: MEDICARE

## 2024-12-30 DIAGNOSIS — G45.9 TRANSIENT CEREBRAL ISCHEMIA, UNSPECIFIED TYPE: ICD-10-CM

## 2024-12-30 DIAGNOSIS — R42 DIZZINESS: ICD-10-CM

## 2024-12-30 DIAGNOSIS — R51.9 ACUTE NONINTRACTABLE HEADACHE, UNSPECIFIED HEADACHE TYPE: Primary | ICD-10-CM

## 2024-12-30 DIAGNOSIS — R79.89 ELEVATED TROPONIN: ICD-10-CM

## 2024-12-30 DIAGNOSIS — I67.9 CEREBROVASCULAR DISEASE, UNSPECIFIED: ICD-10-CM

## 2024-12-30 DIAGNOSIS — G45.9 TIA (TRANSIENT ISCHEMIC ATTACK): ICD-10-CM

## 2024-12-30 LAB
ALBUMIN SERPL BCP-MCNC: 3.8 G/DL (ref 3.4–5)
ALP SERPL-CCNC: 76 U/L (ref 33–136)
ALT SERPL W P-5'-P-CCNC: 26 U/L (ref 10–52)
ANION GAP SERPL CALC-SCNC: 9 MMOL/L (ref 10–20)
APPEARANCE UR: CLEAR
APTT PPP: 41 SECONDS (ref 27–38)
AST SERPL W P-5'-P-CCNC: 26 U/L (ref 9–39)
BASOPHILS # BLD AUTO: 0.04 X10*3/UL (ref 0–0.1)
BASOPHILS NFR BLD AUTO: 1 %
BILIRUB SERPL-MCNC: 0.5 MG/DL (ref 0–1.2)
BILIRUB UR STRIP.AUTO-MCNC: NEGATIVE MG/DL
BNP SERPL-MCNC: 63 PG/ML (ref 0–99)
BUN SERPL-MCNC: 18 MG/DL (ref 6–23)
CALCIUM SERPL-MCNC: 9.1 MG/DL (ref 8.6–10.3)
CARDIAC TROPONIN I PNL SERPL HS: 236 NG/L (ref 0–20)
CARDIAC TROPONIN I PNL SERPL HS: 256 NG/L (ref 0–20)
CHLORIDE SERPL-SCNC: 105 MMOL/L (ref 98–107)
CO2 SERPL-SCNC: 30 MMOL/L (ref 21–32)
COLOR UR: ABNORMAL
CREAT SERPL-MCNC: 1.19 MG/DL (ref 0.5–1.3)
CRP SERPL-MCNC: 0.16 MG/DL
EGFRCR SERPLBLD CKD-EPI 2021: 63 ML/MIN/1.73M*2
EJECTION FRACTION APICAL 4 CHAMBER: 67.2
EJECTION FRACTION: 70 %
EOSINOPHIL # BLD AUTO: 0.08 X10*3/UL (ref 0–0.4)
EOSINOPHIL NFR BLD AUTO: 2 %
ERYTHROCYTE [DISTWIDTH] IN BLOOD BY AUTOMATED COUNT: 15.9 % (ref 11.5–14.5)
ERYTHROCYTE [SEDIMENTATION RATE] IN BLOOD BY WESTERGREN METHOD: 34 MM/H (ref 0–20)
FLUAV RNA RESP QL NAA+PROBE: NOT DETECTED
FLUBV RNA RESP QL NAA+PROBE: NOT DETECTED
GLUCOSE BLD MANUAL STRIP-MCNC: 107 MG/DL (ref 74–99)
GLUCOSE SERPL-MCNC: 106 MG/DL (ref 74–99)
GLUCOSE UR STRIP.AUTO-MCNC: NORMAL MG/DL
HCT VFR BLD AUTO: 39 % (ref 41–52)
HGB BLD-MCNC: 12.2 G/DL (ref 13.5–17.5)
IMM GRANULOCYTES # BLD AUTO: 0.02 X10*3/UL (ref 0–0.5)
IMM GRANULOCYTES NFR BLD AUTO: 0.5 % (ref 0–0.9)
INR PPP: 1.5 (ref 0.9–1.1)
KETONES UR STRIP.AUTO-MCNC: NEGATIVE MG/DL
LEFT VENTRICLE INTERNAL DIMENSION DIASTOLE: 3.81 CM (ref 3.5–6)
LEUKOCYTE ESTERASE UR QL STRIP.AUTO: NEGATIVE
LYMPHOCYTES # BLD AUTO: 0.77 X10*3/UL (ref 0.8–3)
LYMPHOCYTES NFR BLD AUTO: 18.9 %
MAGNESIUM SERPL-MCNC: 1.59 MG/DL (ref 1.6–2.4)
MCH RBC QN AUTO: 24.4 PG (ref 26–34)
MCHC RBC AUTO-ENTMCNC: 31.3 G/DL (ref 32–36)
MCV RBC AUTO: 78 FL (ref 80–100)
MONOCYTES # BLD AUTO: 0.44 X10*3/UL (ref 0.05–0.8)
MONOCYTES NFR BLD AUTO: 10.8 %
MUCOUS THREADS #/AREA URNS AUTO: NORMAL /LPF
NEUTROPHILS # BLD AUTO: 2.72 X10*3/UL (ref 1.6–5.5)
NEUTROPHILS NFR BLD AUTO: 66.8 %
NITRITE UR QL STRIP.AUTO: NEGATIVE
NRBC BLD-RTO: 0 /100 WBCS (ref 0–0)
PH UR STRIP.AUTO: 6 [PH]
PLATELET # BLD AUTO: 183 X10*3/UL (ref 150–450)
POTASSIUM SERPL-SCNC: 4 MMOL/L (ref 3.5–5.3)
PROT SERPL-MCNC: 7.3 G/DL (ref 6.4–8.2)
PROT UR STRIP.AUTO-MCNC: NEGATIVE MG/DL
PROTHROMBIN TIME: 16.9 SECONDS (ref 9.8–12.8)
RBC # BLD AUTO: 5.01 X10*6/UL (ref 4.5–5.9)
RBC # UR STRIP.AUTO: ABNORMAL /UL
RBC #/AREA URNS AUTO: NORMAL /HPF
RIGHT VENTRICLE PEAK SYSTOLIC PRESSURE: 30.4 MMHG
SARS-COV-2 RNA RESP QL NAA+PROBE: NOT DETECTED
SODIUM SERPL-SCNC: 140 MMOL/L (ref 136–145)
SP GR UR STRIP.AUTO: 1.01
SQUAMOUS #/AREA URNS AUTO: NORMAL /HPF
UROBILINOGEN UR STRIP.AUTO-MCNC: NORMAL MG/DL
WBC # BLD AUTO: 4.1 X10*3/UL (ref 4.4–11.3)
WBC #/AREA URNS AUTO: NORMAL /HPF

## 2024-12-30 PROCEDURE — 85610 PROTHROMBIN TIME: CPT | Performed by: NURSE PRACTITIONER

## 2024-12-30 PROCEDURE — 85025 COMPLETE CBC W/AUTO DIFF WBC: CPT | Performed by: NURSE PRACTITIONER

## 2024-12-30 PROCEDURE — 81001 URINALYSIS AUTO W/SCOPE: CPT | Performed by: NURSE PRACTITIONER

## 2024-12-30 PROCEDURE — 83735 ASSAY OF MAGNESIUM: CPT | Performed by: NURSE PRACTITIONER

## 2024-12-30 PROCEDURE — 93005 ELECTROCARDIOGRAM TRACING: CPT

## 2024-12-30 PROCEDURE — 80053 COMPREHEN METABOLIC PANEL: CPT | Performed by: NURSE PRACTITIONER

## 2024-12-30 PROCEDURE — 93325 DOPPLER ECHO COLOR FLOW MAPG: CPT | Performed by: INTERNAL MEDICINE

## 2024-12-30 PROCEDURE — 83036 HEMOGLOBIN GLYCOSYLATED A1C: CPT | Mod: AHULAB

## 2024-12-30 PROCEDURE — 86140 C-REACTIVE PROTEIN: CPT | Performed by: NURSE PRACTITIONER

## 2024-12-30 PROCEDURE — 93308 TTE F-UP OR LMTD: CPT | Performed by: INTERNAL MEDICINE

## 2024-12-30 PROCEDURE — 71046 X-RAY EXAM CHEST 2 VIEWS: CPT | Mod: FOREIGN READ | Performed by: RADIOLOGY

## 2024-12-30 PROCEDURE — 84484 ASSAY OF TROPONIN QUANT: CPT | Performed by: EMERGENCY MEDICINE

## 2024-12-30 PROCEDURE — 99285 EMERGENCY DEPT VISIT HI MDM: CPT | Mod: 25 | Performed by: EMERGENCY MEDICINE

## 2024-12-30 PROCEDURE — G0378 HOSPITAL OBSERVATION PER HR: HCPCS

## 2024-12-30 PROCEDURE — 70450 CT HEAD/BRAIN W/O DYE: CPT | Performed by: STUDENT IN AN ORGANIZED HEALTH CARE EDUCATION/TRAINING PROGRAM

## 2024-12-30 PROCEDURE — 71046 X-RAY EXAM CHEST 2 VIEWS: CPT

## 2024-12-30 PROCEDURE — 2500000002 HC RX 250 W HCPCS SELF ADMINISTERED DRUGS (ALT 637 FOR MEDICARE OP, ALT 636 FOR OP/ED): Performed by: NURSE PRACTITIONER

## 2024-12-30 PROCEDURE — 70450 CT HEAD/BRAIN W/O DYE: CPT

## 2024-12-30 PROCEDURE — 36415 COLL VENOUS BLD VENIPUNCTURE: CPT | Performed by: EMERGENCY MEDICINE

## 2024-12-30 PROCEDURE — 82947 ASSAY GLUCOSE BLOOD QUANT: CPT

## 2024-12-30 PROCEDURE — 87636 SARSCOV2 & INF A&B AMP PRB: CPT | Performed by: NURSE PRACTITIONER

## 2024-12-30 PROCEDURE — 83880 ASSAY OF NATRIURETIC PEPTIDE: CPT | Performed by: NURSE PRACTITIONER

## 2024-12-30 PROCEDURE — 84484 ASSAY OF TROPONIN QUANT: CPT | Performed by: NURSE PRACTITIONER

## 2024-12-30 PROCEDURE — 93325 DOPPLER ECHO COLOR FLOW MAPG: CPT

## 2024-12-30 PROCEDURE — 2500000001 HC RX 250 WO HCPCS SELF ADMINISTERED DRUGS (ALT 637 FOR MEDICARE OP): Performed by: NURSE PRACTITIONER

## 2024-12-30 PROCEDURE — 2500000001 HC RX 250 WO HCPCS SELF ADMINISTERED DRUGS (ALT 637 FOR MEDICARE OP): Performed by: EMERGENCY MEDICINE

## 2024-12-30 PROCEDURE — 85652 RBC SED RATE AUTOMATED: CPT | Performed by: NURSE PRACTITIONER

## 2024-12-30 PROCEDURE — 36415 COLL VENOUS BLD VENIPUNCTURE: CPT | Performed by: NURSE PRACTITIONER

## 2024-12-30 PROCEDURE — 84443 ASSAY THYROID STIM HORMONE: CPT

## 2024-12-30 PROCEDURE — 93321 DOPPLER ECHO F-UP/LMTD STD: CPT | Performed by: INTERNAL MEDICINE

## 2024-12-30 RX ORDER — ACETAMINOPHEN 650 MG/1
650 SUPPOSITORY RECTAL EVERY 4 HOURS PRN
Status: DISCONTINUED | OUTPATIENT
Start: 2024-12-30 | End: 2025-01-01 | Stop reason: HOSPADM

## 2024-12-30 RX ORDER — NAPROXEN SODIUM 220 MG/1
324 TABLET, FILM COATED ORAL ONCE
Status: COMPLETED | OUTPATIENT
Start: 2024-12-30 | End: 2024-12-30

## 2024-12-30 RX ORDER — POTASSIUM CHLORIDE 20 MEQ/1
20 TABLET, EXTENDED RELEASE ORAL DAILY
Status: DISCONTINUED | OUTPATIENT
Start: 2024-12-31 | End: 2025-01-01 | Stop reason: HOSPADM

## 2024-12-30 RX ORDER — INSULIN LISPRO 100 [IU]/ML
0-10 INJECTION, SOLUTION INTRAVENOUS; SUBCUTANEOUS
Status: DISCONTINUED | OUTPATIENT
Start: 2024-12-30 | End: 2025-01-01 | Stop reason: HOSPADM

## 2024-12-30 RX ORDER — PANTOPRAZOLE SODIUM 40 MG/1
40 TABLET, DELAYED RELEASE ORAL
Status: DISCONTINUED | OUTPATIENT
Start: 2024-12-31 | End: 2025-01-01 | Stop reason: HOSPADM

## 2024-12-30 RX ORDER — MECLIZINE HYDROCHLORIDE 25 MG/1
25 TABLET ORAL 3 TIMES DAILY PRN
Status: DISCONTINUED | OUTPATIENT
Start: 2024-12-30 | End: 2025-01-01 | Stop reason: HOSPADM

## 2024-12-30 RX ORDER — FINASTERIDE 5 MG/1
5 TABLET, FILM COATED ORAL DAILY
Status: DISCONTINUED | OUTPATIENT
Start: 2024-12-31 | End: 2025-01-01 | Stop reason: HOSPADM

## 2024-12-30 RX ORDER — ACETAMINOPHEN 160 MG/5ML
650 SOLUTION ORAL EVERY 4 HOURS PRN
Status: DISCONTINUED | OUTPATIENT
Start: 2024-12-30 | End: 2025-01-01 | Stop reason: HOSPADM

## 2024-12-30 RX ORDER — ATORVASTATIN CALCIUM 80 MG/1
80 TABLET, FILM COATED ORAL NIGHTLY
Status: DISCONTINUED | OUTPATIENT
Start: 2024-12-30 | End: 2025-01-01 | Stop reason: HOSPADM

## 2024-12-30 RX ORDER — CHLORTHALIDONE 25 MG/1
25 TABLET ORAL DAILY
Status: DISCONTINUED | OUTPATIENT
Start: 2024-12-31 | End: 2025-01-01 | Stop reason: HOSPADM

## 2024-12-30 RX ORDER — ACETAMINOPHEN 325 MG/1
650 TABLET ORAL EVERY 4 HOURS PRN
Status: DISCONTINUED | OUTPATIENT
Start: 2024-12-30 | End: 2025-01-01 | Stop reason: HOSPADM

## 2024-12-30 RX ORDER — ACETAMINOPHEN 325 MG/1
975 TABLET ORAL ONCE
Status: COMPLETED | OUTPATIENT
Start: 2024-12-30 | End: 2024-12-30

## 2024-12-30 RX ORDER — LOSARTAN POTASSIUM 50 MG/1
100 TABLET ORAL DAILY
Status: DISCONTINUED | OUTPATIENT
Start: 2024-12-31 | End: 2025-01-01 | Stop reason: HOSPADM

## 2024-12-30 RX ORDER — AMOXICILLIN 250 MG
2 CAPSULE ORAL 2 TIMES DAILY
Status: DISCONTINUED | OUTPATIENT
Start: 2024-12-30 | End: 2025-01-01 | Stop reason: HOSPADM

## 2024-12-30 RX ORDER — EZETIMIBE 10 MG/1
10 TABLET ORAL NIGHTLY
Status: DISCONTINUED | OUTPATIENT
Start: 2024-12-30 | End: 2025-01-01 | Stop reason: HOSPADM

## 2024-12-30 RX ORDER — ASPIRIN 81 MG/1
81 TABLET ORAL DAILY
Status: DISCONTINUED | OUTPATIENT
Start: 2024-12-31 | End: 2025-01-01 | Stop reason: HOSPADM

## 2024-12-30 RX ADMIN — ASPIRIN 324 MG: 81 TABLET, CHEWABLE ORAL at 12:52

## 2024-12-30 RX ADMIN — EZETIMIBE 10 MG: 10 TABLET ORAL at 21:05

## 2024-12-30 RX ADMIN — ATORVASTATIN CALCIUM 80 MG: 80 TABLET, FILM COATED ORAL at 21:05

## 2024-12-30 RX ADMIN — RIVAROXABAN 20 MG: 20 TABLET, FILM COATED ORAL at 18:46

## 2024-12-30 RX ADMIN — ACETAMINOPHEN 975 MG: 325 TABLET, FILM COATED ORAL at 12:53

## 2024-12-30 RX ADMIN — ACETAMINOPHEN 650 MG: 325 TABLET, FILM COATED ORAL at 21:11

## 2024-12-30 SDOH — ECONOMIC STABILITY: INCOME INSECURITY: IN THE PAST 12 MONTHS HAS THE ELECTRIC, GAS, OIL, OR WATER COMPANY THREATENED TO SHUT OFF SERVICES IN YOUR HOME?: NO

## 2024-12-30 SDOH — SOCIAL STABILITY: SOCIAL INSECURITY: DO YOU FEEL UNSAFE GOING BACK TO THE PLACE WHERE YOU ARE LIVING?: NO

## 2024-12-30 SDOH — HEALTH STABILITY: MENTAL HEALTH: HOW MANY DRINKS CONTAINING ALCOHOL DO YOU HAVE ON A TYPICAL DAY WHEN YOU ARE DRINKING?: PATIENT DOES NOT DRINK

## 2024-12-30 SDOH — SOCIAL STABILITY: SOCIAL INSECURITY
WITHIN THE LAST YEAR, HAVE YOU BEEN RAPED OR FORCED TO HAVE ANY KIND OF SEXUAL ACTIVITY BY YOUR PARTNER OR EX-PARTNER?: NO

## 2024-12-30 SDOH — ECONOMIC STABILITY: HOUSING INSECURITY: IN THE LAST 12 MONTHS, WAS THERE A TIME WHEN YOU WERE NOT ABLE TO PAY THE MORTGAGE OR RENT ON TIME?: NO

## 2024-12-30 SDOH — SOCIAL STABILITY: SOCIAL INSECURITY: WITHIN THE LAST YEAR, HAVE YOU BEEN HUMILIATED OR EMOTIONALLY ABUSED IN OTHER WAYS BY YOUR PARTNER OR EX-PARTNER?: NO

## 2024-12-30 SDOH — ECONOMIC STABILITY: TRANSPORTATION INSECURITY: IN THE PAST 12 MONTHS, HAS LACK OF TRANSPORTATION KEPT YOU FROM MEDICAL APPOINTMENTS OR FROM GETTING MEDICATIONS?: NO

## 2024-12-30 SDOH — SOCIAL STABILITY: SOCIAL INSECURITY: ABUSE: ADULT

## 2024-12-30 SDOH — ECONOMIC STABILITY: HOUSING INSECURITY: AT ANY TIME IN THE PAST 12 MONTHS, WERE YOU HOMELESS OR LIVING IN A SHELTER (INCLUDING NOW)?: NO

## 2024-12-30 SDOH — SOCIAL STABILITY: SOCIAL INSECURITY
WITHIN THE LAST YEAR, HAVE YOU BEEN KICKED, HIT, SLAPPED, OR OTHERWISE PHYSICALLY HURT BY YOUR PARTNER OR EX-PARTNER?: NO

## 2024-12-30 SDOH — ECONOMIC STABILITY: FOOD INSECURITY: WITHIN THE PAST 12 MONTHS, THE FOOD YOU BOUGHT JUST DIDN'T LAST AND YOU DIDN'T HAVE MONEY TO GET MORE.: NEVER TRUE

## 2024-12-30 SDOH — ECONOMIC STABILITY: FOOD INSECURITY: WITHIN THE PAST 12 MONTHS, YOU WORRIED THAT YOUR FOOD WOULD RUN OUT BEFORE YOU GOT THE MONEY TO BUY MORE.: NEVER TRUE

## 2024-12-30 SDOH — SOCIAL STABILITY: SOCIAL INSECURITY: WITHIN THE LAST YEAR, HAVE YOU BEEN AFRAID OF YOUR PARTNER OR EX-PARTNER?: NO

## 2024-12-30 SDOH — ECONOMIC STABILITY: HOUSING INSECURITY: IN THE PAST 12 MONTHS, HOW MANY TIMES HAVE YOU MOVED WHERE YOU WERE LIVING?: 0

## 2024-12-30 SDOH — SOCIAL STABILITY: SOCIAL INSECURITY: DOES ANYONE TRY TO KEEP YOU FROM HAVING/CONTACTING OTHER FRIENDS OR DOING THINGS OUTSIDE YOUR HOME?: NO

## 2024-12-30 SDOH — SOCIAL STABILITY: SOCIAL INSECURITY: HAS ANYONE EVER THREATENED TO HURT YOUR FAMILY OR YOUR PETS?: NO

## 2024-12-30 SDOH — HEALTH STABILITY: MENTAL HEALTH: HOW OFTEN DO YOU HAVE SIX OR MORE DRINKS ON ONE OCCASION?: NEVER

## 2024-12-30 SDOH — SOCIAL STABILITY: SOCIAL INSECURITY: ARE THERE ANY APPARENT SIGNS OF INJURIES/BEHAVIORS THAT COULD BE RELATED TO ABUSE/NEGLECT?: NO

## 2024-12-30 SDOH — SOCIAL STABILITY: SOCIAL INSECURITY: ARE YOU OR HAVE YOU BEEN THREATENED OR ABUSED PHYSICALLY, EMOTIONALLY, OR SEXUALLY BY ANYONE?: NO

## 2024-12-30 SDOH — HEALTH STABILITY: MENTAL HEALTH: HOW OFTEN DO YOU HAVE A DRINK CONTAINING ALCOHOL?: NEVER

## 2024-12-30 SDOH — HEALTH STABILITY: PHYSICAL HEALTH
HOW OFTEN DO YOU NEED TO HAVE SOMEONE HELP YOU WHEN YOU READ INSTRUCTIONS, PAMPHLETS, OR OTHER WRITTEN MATERIAL FROM YOUR DOCTOR OR PHARMACY?: NEVER

## 2024-12-30 SDOH — HEALTH STABILITY: PHYSICAL HEALTH: ON AVERAGE, HOW MANY DAYS PER WEEK DO YOU ENGAGE IN MODERATE TO STRENUOUS EXERCISE (LIKE A BRISK WALK)?: 1 DAY

## 2024-12-30 SDOH — SOCIAL STABILITY: SOCIAL INSECURITY: HAVE YOU HAD ANY THOUGHTS OF HARMING ANYONE ELSE?: NO

## 2024-12-30 SDOH — SOCIAL STABILITY: SOCIAL INSECURITY: DO YOU FEEL ANYONE HAS EXPLOITED OR TAKEN ADVANTAGE OF YOU FINANCIALLY OR OF YOUR PERSONAL PROPERTY?: NO

## 2024-12-30 SDOH — SOCIAL STABILITY: SOCIAL INSECURITY: HAVE YOU HAD THOUGHTS OF HARMING ANYONE ELSE?: NO

## 2024-12-30 SDOH — ECONOMIC STABILITY: FOOD INSECURITY: HOW HARD IS IT FOR YOU TO PAY FOR THE VERY BASICS LIKE FOOD, HOUSING, MEDICAL CARE, AND HEATING?: NOT HARD AT ALL

## 2024-12-30 SDOH — HEALTH STABILITY: PHYSICAL HEALTH: ON AVERAGE, HOW MANY MINUTES DO YOU ENGAGE IN EXERCISE AT THIS LEVEL?: 10 MIN

## 2024-12-30 SDOH — SOCIAL STABILITY: SOCIAL INSECURITY: WERE YOU ABLE TO COMPLETE ALL THE BEHAVIORAL HEALTH SCREENINGS?: YES

## 2024-12-30 ASSESSMENT — LIFESTYLE VARIABLES
AUDIT-C TOTAL SCORE: 2
HOW OFTEN DO YOU HAVE 6 OR MORE DRINKS ON ONE OCCASION: LESS THAN MONTHLY
SUBSTANCE_ABUSE_PAST_12_MONTHS: NO
HOW OFTEN DO YOU HAVE A DRINK CONTAINING ALCOHOL: MONTHLY OR LESS
HOW MANY STANDARD DRINKS CONTAINING ALCOHOL DO YOU HAVE ON A TYPICAL DAY: 1 OR 2
AUDIT-C TOTAL SCORE: 0
SKIP TO QUESTIONS 9-10: 1
AUDIT-C TOTAL SCORE: 2
SKIP TO QUESTIONS 9-10: 0
PRESCIPTION_ABUSE_PAST_12_MONTHS: NO

## 2024-12-30 ASSESSMENT — PAIN DESCRIPTION - PAIN TYPE: TYPE: ACUTE PAIN

## 2024-12-30 ASSESSMENT — ACTIVITIES OF DAILY LIVING (ADL)
JUDGMENT_ADEQUATE_SAFELY_COMPLETE_DAILY_ACTIVITIES: YES
HEARING - RIGHT EAR: FUNCTIONAL
ASSISTIVE_DEVICE: CANE;WALKER
LACK_OF_TRANSPORTATION: NO
GROOMING: INDEPENDENT
ADEQUATE_TO_COMPLETE_ADL: NO
TOILETING: INDEPENDENT
LACK_OF_TRANSPORTATION: NO
PATIENT'S MEMORY ADEQUATE TO SAFELY COMPLETE DAILY ACTIVITIES?: YES
BATHING: INDEPENDENT
WALKS IN HOME: INDEPENDENT
HEARING - LEFT EAR: FUNCTIONAL
FEEDING YOURSELF: INDEPENDENT
DRESSING YOURSELF: INDEPENDENT

## 2024-12-30 ASSESSMENT — COGNITIVE AND FUNCTIONAL STATUS - GENERAL
MOBILITY SCORE: 24
DAILY ACTIVITIY SCORE: 24
PATIENT BASELINE BEDBOUND: NO

## 2024-12-30 ASSESSMENT — COLUMBIA-SUICIDE SEVERITY RATING SCALE - C-SSRS
1. IN THE PAST MONTH, HAVE YOU WISHED YOU WERE DEAD OR WISHED YOU COULD GO TO SLEEP AND NOT WAKE UP?: NO
6. HAVE YOU EVER DONE ANYTHING, STARTED TO DO ANYTHING, OR PREPARED TO DO ANYTHING TO END YOUR LIFE?: NO
2. HAVE YOU ACTUALLY HAD ANY THOUGHTS OF KILLING YOURSELF?: NO

## 2024-12-30 ASSESSMENT — PAIN SCALES - GENERAL
PAINLEVEL_OUTOF10: 0 - NO PAIN
PAINLEVEL_OUTOF10: 1
PAINLEVEL_OUTOF10: 7

## 2024-12-30 ASSESSMENT — PAIN DESCRIPTION - LOCATION: LOCATION: HEAD

## 2024-12-30 ASSESSMENT — PAIN - FUNCTIONAL ASSESSMENT
PAIN_FUNCTIONAL_ASSESSMENT: 0-10

## 2024-12-30 ASSESSMENT — PATIENT HEALTH QUESTIONNAIRE - PHQ9
2. FEELING DOWN, DEPRESSED OR HOPELESS: NOT AT ALL
1. LITTLE INTEREST OR PLEASURE IN DOING THINGS: NOT AT ALL
SUM OF ALL RESPONSES TO PHQ9 QUESTIONS 1 & 2: 0

## 2024-12-30 NOTE — H&P
"History and Physical  [facility] EMERGENCY MEDICINE  Patient: Cristy Forte  MRN: [id number]  : 1945  Date of Evaluation: 2024  ED Provider: KAROL Dominguez DNP      Limitations to history: None   Independent Historian: Yes  External Records Reviewed: EMR       Patient History:  Cristy Forte is a 79 y.o. 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 He also had a loss or right peripheral vision x 15 minutes on Friday night while he was watching TV but resolved on its own in about 15 min. He has had peripheral vision loss in the past but in the left eye.   He has seen PT for \"imbalance\" in the recent weeks and had an echo done in  which showed,    1. Left ventricular ejection fraction is hyperdynamic, by visual estimate at 80%.   2. Spectral Doppler shows a pseudonormal pattern of left ventricular diastolic filling.   3. There is a mild mid cavity left ventricular obstruction.   4. There is normal right ventricular global systolic function.   5. The left atrium is moderately dilated.   6. There is severe mitral annular calcification.   7. Mildly elevated RVSP.   8. There is a bioprosthetic aortic valve.   9. Normal aortic valve prosthesis structure and function.  He had a negative stress test in 2018.  Had an MRI of the brain in 3/24 with no acute findings.   His work up in the ED showed NO acute findings with a negative CT of the head for any acute findings. His labs were significant for magnesium of 1.59 and troponin of 256->236  He was given  mg as well as " Tylenol 975 mg.   On assessment, patient had NO headache, dizziness, blurry or double vision. He had NO chest pain and/or dyspnea despite his elevated troponin.   Denied orthopnea and no peripheral edema.     The acute evaluation included:  Orders Placed This Encounter   Procedures    CT head wo IV contrast    XR chest 2 views    Urinalysis with Reflex Culture and Microscopic    CBC and Auto Differential    Magnesium    Comprehensive metabolic panel    Troponin I, High Sensitivity    Coagulation Screen    B-Type Natriuretic Peptide    Sars-CoV-2 and Influenza A/B PCR    Urinalysis with Reflex Culture and Microscopic    Extra Urine Gray Tube    Troponin I, High Sensitivity    Adult diet Consistent Carb; CCD 60 gm/meal    Weight on admission    Height on admission    Activity (specify) No Restrictions    Full code    ECG 12 lead    Electrocardiogram, 12-lead PRN ACS symptoms    Insert and maintain peripheral IV    Saline lock IV    Initiate observation status    ED to floor bed request       Upon admission to the Clinical Decision Unit, patient appears non-toxic with no acute distress.     Past History     Past Medical History:   Diagnosis Date    Cerebral infarction, unspecified 07/22/2020    Brainstem stroke    Encounter for screening for malignant neoplasm of rectum     Encounter for screening for malignant neoplasm of rectum    Impacted cerumen, bilateral 12/05/2016    Impacted cerumen of both ears    Inguinal hernia 02/07/2023    Nocturia     Nocturia    Other conditions influencing health status     Screening for malignant neoplasms, colon    Other spondylosis with myelopathy, cervical region 02/07/2023    Personal history of other diseases of male genital organs 02/15/2019    History of chronic prostatitis    Personal history of other diseases of the circulatory system 04/08/2015    History of angina pectoris    Personal history of other diseases of the respiratory system 10/14/2015    History of influenza     Personal history of other drug therapy 2014    History of pneumococcal vaccination    Personal history of other specified conditions 2019    History of odynophagia    Personal history of other specified conditions     History of edema    Radiculopathy, cervical region     Cervical radiculopathy    Stenosis, cervical spine 2023    Unspecified hemorrhoids     Bleeding hemorrhoids     Past Surgical History:   Procedure Laterality Date    CERVICAL FUSION  10/03/2013    Cervical Vertebral Fusion    KNEE SURGERY  2014    Knee Surgery    MR HEAD ANGIO WO IV CONTRAST  2020    MR HEAD ANGIO WO IV CONTRAST 2020 [facility]    MR HEAD ANGIO WO IV CONTRAST  10/30/2022    MR HEAD ANGIO WO IV CONTRAST 10/30/2022 [facility]    MR NECK ANGIO WO IV CONTRAST  2020    MR NECK ANGIO WO IV CONTRAST 2020 [facility]    MR NECK ANGIO WO IV CONTRAST  10/30/2022    MR NECK ANGIO WO IV CONTRAST 10/30/2022 [facility]    OTHER SURGICAL HISTORY  2019    Tonsillectomy    OTHER SURGICAL HISTORY  2019    Adenoidectomy    OTHER SURGICAL HISTORY  2020    Aortic valve replacement    PILONIDAL CYST DRAINAGE  2014    Pilonidal Cyst Resection     Social History     Socioeconomic History    Marital status:    Tobacco Use    Smoking status: Former     Current packs/day: 0.00     Types: Cigarettes     Quit date:      Years since quittin.0     Passive exposure: Never    Smokeless tobacco: Never   Substance and Sexual Activity    Alcohol use: Yes     Comment: OCASSIONALLY    Drug use: Never     Social Drivers of Health     Financial Resource Strain: Low Risk  (2024)    Overall Financial Resource Strain (CARDIA)     Difficulty of Paying Living Expenses: Not hard at all   Transportation Needs: No Transportation Needs (2024)    PRAPARE - Transportation     Lack of Transportation (Medical): No     Lack of Transportation  "(Non-Medical): No   Housing Stability: Low Risk  (12/30/2024)    Housing Stability Vital Sign     Unable to Pay for Housing in the Last Year: No     Number of Times Moved in the Last Year: 1     Homeless in the Last Year: No         Medications/Allergies     Previous Medications    ASPIRIN 81 MG CAPSULE    Take 1 tablet by mouth 1 (one) time each day.    ATORVASTATIN (LIPITOR) 80 MG TABLET    TAKE 1 TABLET EVERY DAY    BLOOD SUGAR DIAGNOSTIC STRIP    1 strip 2 times a day as needed (Use to test blood glucose 1-2 times daily).    BLOOD-GLUCOSE METER MISC    Use twice per day PRN    CHLORTHALIDONE (HYGROTON) 25 MG TABLET    TAKE 1 TABLET EVERY DAY    EZETIMIBE (ZETIA) 10 MG TABLET    TAKE 1 TABLET EVERY DAY    FINASTERIDE (PROSCAR) 5 MG TABLET    TAKE 1 TABLET ONE TIME DAILY    LANCETS MISC    1 Lancet 2 times a day as needed (Use to check blood glucose 1-2 times daily). Use twice daily PRN    LOSARTAN (COZAAR) 100 MG TABLET    Take 1 tablet (100 mg) by mouth once daily.    METFORMIN XR (GLUCOPHAGE-XR) 750 MG 24 HR TABLET    Take 1 tablet (750 mg) by mouth once daily in the evening. Take with meals. Do not crush, chew, or split.    PANTOPRAZOLE (PROTONIX) 40 MG EC TABLET    TAKE 1 TABLET EVERY MORNING BEFORE A MEAL. DO NOT CRUSH, CHEW, OR SPLIT.    POTASSIUM CHLORIDE CR 20 MEQ ER TABLET    TAKE 1 TABLET EVERY DAY    RIVAROXABAN (XARELTO) 20 MG TABLET    Take 1 tablet (20 mg) by mouth once daily in the evening. Take with meals.    SPIRONOLACTONE (ALDACTONE) 25 MG TABLET    Take 1 tablet (25 mg) by mouth once daily.     Allergies   Allergen Reactions    Iodinated Contrast Media Hives    Latex Unknown         Review of Systems  All systems reviewed and otherwise negative, except as stated above in HPI.      Physical Exam     Visit Vitals  /80   Pulse 67   Temp 36.4 °C (97.5 °F) (Temporal)   Resp 18   Ht 1.778 m (5' 10\")   Wt 104 kg (229 lb)   SpO2 98%   BMI 32.86 kg/m²   Smoking Status Former   BSA 2.27 m² "       GENERAL:  The patient appears nourished and normally developed. Vital signs as documented.     HEENT:  Head normocephalic, atraumatic, EOMs intact, PERRLA, Mucous membranes moist. Nares patent without copious rhinorrhea.  No lymphadenopathy.    PULMONARY:  Lungs are clear to auscultation, without any respiratory distress. Able to speak full sentences, no accessory muscle use    CARDIAC:   Normal rate. No murmurs, rubs or gallops    ABDOMEN:  Soft, non-distended, non-tender, BS positive x 4 quadrants, No rebound or guarding, no peritoneal signs, no CVA tenderness, no masses or organomegaly    MUSCULOSKELETAL:   Able to ambulate, Non edematous, with no obvious deformities. Pulses intact distal    SKIN:   Good color, with no significant rashes.  No pallor.    NEURO:  No obvious neurological deficits, normal sensation and strength bilaterally.  Able to follow commands, NIH 0, CN 2-12 intact.    Psych: Appropriate mood and affect    Consultants  1) none       Impression and Plan  In summary, Cristy Forte is admitted to the [facility] Clinical Decision Unit for dizziness, headache, off balance. Dr. Araya is the CDU admission attending.    This patient has been risk-stratified based on available history, physical exam, and related study findings. Admission to the observation status for further diagnosis/treatment/monitoring of dizziness is warranted clinically. This extended period of observation is specifically required to determine the need for hospitalization.     The goals of this admission based on the patient’s clinical problem list are:  1) dizziness, off balance, headache   Resolved  Chronic in nature   Meclizine as needed   Recent echo in September NOT with bubble study   NO chest pain on assessment     2) Peripheral vision loss  ESR, CRP, MRI brain without contrast - consider neurology consult   Limited echo with bubble study       We will observe the patient for the following  endpoints:   1) observation and will make a safe dispo once he has been observed for 23 hrs     The patient was discussed and reviewed with Dr. Araya.     When met, appropriate disposition will be arranged.

## 2024-12-30 NOTE — PROGRESS NOTES
Transitional Care Coordination Progress Note:  Plan per Medical/Surgical team: treatment of headache R/O TIA with tylenol & ASA  Status: Observation  Payor source: medicare A/B  Discharge disposition: Home alone   Potential Barriers: denies any additional needs   ADOD: 12/31/2024   CRUZITO Crowley RN, BSN Transitional Care Coordinator ED# [phone]     When patient is medically ready for discharge  They are being discharged to: home  Daughter will pick patient up  No SNF  No HHC  No DME  No O2  No Wounds      12/30/24 1448   Discharge Planning   Living Arrangements Alone   Support Systems Children;Family members  (daughter & sister)   Assistance Needed ASA for R/O TIA  Tylenol for headache   Type of Residence Private residence   Number of Stairs to Enter Residence 2   Number of Stairs Within Residence 0   Do you have animals or pets at home? No   Home or Post Acute Services None   Expected Discharge Disposition Home   Does the patient need discharge transport arranged? No   Financial Resource Strain   How hard is it for you to pay for the very basics like food, housing, medical care, and heating? Not hard   Housing Stability   In the last 12 months, was there a time when you were not able to pay the mortgage or rent on time? N   In the past 12 months, how many times have you moved where you were living? 1   At any time in the past 12 months, were you homeless or living in a shelter (including now)? N   Transportation Needs   In the past 12 months, has lack of transportation kept you from medical appointments or from getting medications? no   In the past 12 months, has lack of transportation kept you from meetings, work, or from getting things needed for daily living? No   Patient Choice   Patient / Family choosing to utilize agency / facility established prior to hospitalization No   Stroke Family Assessment   Stroke Family Assessment Needed Yes   Primary Caregiver/Family After Discharge Adult Child   Additional  Support if 24 hour Supervision Required daughter & sister   Physical Layout of Home One Story   Caregiver/Family can provide assistance with ADL's Yes   Caregiver/Family can provide mobility assistance for transfers in and out of bed, chair or car Yes   Medications: Caregiver/Family can obtain prescriptions Yes   Medications: Caregiver/Family can assist with medication administration Yes   Medications: Caregiver/Family verbalizes understanding of medications Yes   Caregiver/Family agrees with discharge plan to home Yes   Caregiver/Family verbalizes capability of caring for patient at home Yes   Intensity of Service   Intensity of Service 0-30 min

## 2024-12-30 NOTE — PROGRESS NOTES
Pharmacy Medication History     Source of Information: Per patient     Additional concerns with the patient's PTA list.   N/a   The following updates were made to the Prior to Admission medication list:     Medications ADDED:   N/a  Medications CHANGED:  N/a  Medications REMOVED:   N/a  Medications NOT TAKING:   N/a    Allergy reviewed : Yes    Meds 2 Beds : Yes    Outpatient pharmacy confirmed and updated in chart : Yes    [facility]     The list below reflectives the updated PTA list. Please review each medication in order reconciliation for additional clarification and justification.    Prior to Admission Medications   Prescriptions Last Dose Informant   aspirin 81 mg EC tablet 2024 Morning    Sig: Take 1 tablet (81 mg) by mouth once daily.   atorvastatin (Lipitor) 80 mg tablet 2024    Sig: TAKE 1 TABLET EVERY DAY   blood sugar diagnostic strip     Si strip 2 times a day as needed (Use to test blood glucose 1-2 times daily).   blood-glucose meter misc     Sig: Use twice per day PRN   chlorthalidone (Hygroton) 25 mg tablet 2024    Sig: TAKE 1 TABLET EVERY DAY   ezetimibe (Zetia) 10 mg tablet 2024    Sig: TAKE 1 TABLET EVERY DAY   finasteride (Proscar) 5 mg tablet 2024    Sig: TAKE 1 TABLET ONE TIME DAILY   lancets misc     Si Lancet 2 times a day as needed (Use to check blood glucose 1-2 times daily). Use twice daily PRN   losartan (Cozaar) 100 mg tablet 2024    Sig: Take 1 tablet (100 mg) by mouth once daily.   metFORMIN XR (Glucophage-XR) 750 mg 24 hr tablet 2024    Sig: Take 1 tablet (750 mg) by mouth once daily in the evening. Take with meals. Do not crush, chew, or split.   pantoprazole (ProtoNix) 40 mg EC tablet 2024    Sig: TAKE 1 TABLET EVERY MORNING BEFORE A MEAL. DO NOT CRUSH, CHEW, OR SPLIT.   Patient taking differently: Take 1 tablet (40 mg) by mouth once daily.   potassium chloride CR 20 mEq ER tablet 2024    Sig:  TAKE 1 TABLET EVERY DAY   rivaroxaban (Xarelto) 20 mg tablet 12/29/2024    Sig: Take 1 tablet (20 mg) by mouth once daily in the evening. Take with meals.   spironolactone (Aldactone) 25 mg tablet 12/29/2024    Sig: Take 1 tablet (25 mg) by mouth once daily.      Facility-Administered Medications: None       The list below reflectives the updated allergy list. Please review each documented allergy for additional clarification and justification.    Allergies   Allergen Reactions    Iodinated Contrast Media Hives    Latex Unknown          12/30/24 at 3:30 PM - Adele Foster

## 2024-12-30 NOTE — PROGRESS NOTES
12/30/24 1448   [facility] Disability Status   Are you deaf or do you have serious difficulty hearing? N   Are you blind or do you have serious difficulty seeing, even when wearing glasses? N   Because of a physical, mental, or emotional condition, do you have serious difficulty concentrating, remembering, or making decisions? (5 years old or older) N   Do you have serious difficulty walking or climbing stairs? Y  (walker & cane)   Do you have serious difficulty dressing or bathing? N   Because of a physical, mental, or emotional condition, do you have serious difficulty doing errands alone such as visiting the doctor? N  (still drives)

## 2024-12-30 NOTE — ED TRIAGE NOTES
TRIAGE NOTE   I saw the patient as the Clinician in Triage and performed a brief history and physical exam, established acuity, and ordered appropriate tests to develop basic plan of care. Patient will be seen by an [facility], resident and/or physician who will independently evaluate the patient. Please see subsequent provider notes for further details and disposition.     Brief HPI: In brief, Cristy Forte is a 79 y.o. male that presents for headache and feeling dizzy for couple years.  He had an MRI of the brain head and neck in March that showed no acute findings.  He is denying vision change.  He is able to ambulate under his own strength.  He denies chest pain or dyspnea.  He denies abdominal pain, nausea, or vomiting.  He endorses fever and chills wife who is bedside is negative for any acute symptoms he has been told by his PCP that he has peripheral vertigo..     Focused Physical exam:   NIH is 0.  Stroke fast negative.  Clear bilateral lung sounds.  Normal S1-S2 and rate  Plan/MDM:   Patient received meclizine for his peripheral vertigo.  CT head was ordered because he has had no imaging since March and the headache worsened over the last 3 days.  He had clear bilateral lung sounds and normal S1-S2 and rate.  I ordered an EKG troponin CBC CMP TSH and urinalysis.  Please see subsequent provider note for further details and disposition

## 2024-12-30 NOTE — ED PROVIDER NOTES
"Emergency Department Provider Note             History of Present Illness   CC: Headache    History provided by: Patient and Family Member  Limitations to History: None  External Records Reviewed: cardiology note 12/4/24    HPI:  Cristy Forte is a 79 y.o. male with history including hypertension, hyperlipidemia, CAD status post remote PCI, AVR on Eliquis presenting to the emergency department for a headache that started insidiously at the top of his head a few days ago. Denies any obvious inciting event. It's persisted but has started to improve while here. He also states he had a transient episode of right peripheral vision loss 3 nights ago. Has had this happen in his left eye in the past but never his right. This resolved after about 15 minutes and hasn't recurred. Did not directly correlate with headache onset. Additionally, he reports a year of dizziness and unsteadiness and has felt \"off\" for a few months. Family notes he's undergone extensive workup for this without clear cause. He denies any significant change recently. Denies other vision changes, nausea, vomiting, abdominal pain, hearing changes or ear pain, abdominal pain, chest pain, dyspnea, cough. Denies medication changes.     ---  Past Medical History:   Diagnosis Date    Cerebral infarction, unspecified 07/22/2020    Brainstem stroke    Encounter for screening for malignant neoplasm of rectum     Encounter for screening for malignant neoplasm of rectum    Impacted cerumen, bilateral 12/05/2016    Impacted cerumen of both ears    Inguinal hernia 02/07/2023    Nocturia     Nocturia    Other conditions influencing health status     Screening for malignant neoplasms, colon    Other spondylosis with myelopathy, cervical region 02/07/2023    Personal history of other diseases of male genital organs 02/15/2019    History of chronic prostatitis    Personal history of other diseases of the circulatory system 04/08/2015    History of angina pectoris    " Personal history of other diseases of the respiratory system 10/14/2015    History of influenza    Personal history of other drug therapy 12/11/2014    History of pneumococcal vaccination    Personal history of other specified conditions 02/06/2019    History of odynophagia    Personal history of other specified conditions     History of edema    Radiculopathy, cervical region     Cervical radiculopathy    Stenosis, cervical spine 02/07/2023    Unspecified hemorrhoids     Bleeding hemorrhoids     Past Surgical History:   Procedure Laterality Date    CERVICAL FUSION  10/03/2013    Cervical Vertebral Fusion    KNEE SURGERY  12/11/2014    Knee Surgery    MR HEAD ANGIO WO IV CONTRAST  6/21/2020    MR HEAD ANGIO WO IV CONTRAST 6/21/2020 [facility]    MR HEAD ANGIO WO IV CONTRAST  10/30/2022    MR HEAD ANGIO WO IV CONTRAST 10/30/2022 [facility]    MR NECK ANGIO WO IV CONTRAST  6/21/2020    MR NECK ANGIO WO IV CONTRAST 6/21/2020 [facility]    MR NECK ANGIO WO IV CONTRAST  10/30/2022    MR NECK ANGIO WO IV CONTRAST 10/30/2022 [facility]    OTHER SURGICAL HISTORY  01/09/2019    Tonsillectomy    OTHER SURGICAL HISTORY  01/09/2019    Adenoidectomy    OTHER SURGICAL HISTORY  02/18/2020    Aortic valve replacement    PILONIDAL CYST DRAINAGE  12/11/2014    Pilonidal Cyst Resection       Allergies   Allergen Reactions    Iodinated Contrast Media Hives    Latex Unknown       Physical Exam   Triage vitals:  T 36.4 °C (97.5 °F)  HR 88  /65  RR 18  O2 98 % None (Room air)    General: awake, well-appearing, no distress  Head: normocephalic, atraumatic, no temporal tenderness  Eyes: pupils equal, extraocular movements grossly intact, no conjunctival injection or scleral icterus  ENT: nares patent, moist mucous membranes  Neck: supple, trachea midline, no masses  CV: regular rate and rhythm, well-perfused  Resp: breathing is non-labored, speaking in full sentences. Lungs are clear to  "auscultation bilaterally  GI: soft, non-distended, non-tender, no rebound or guarding  Extremities: no edema, no gross deformity   Neuro: alert, fully oriented, follows commands, no aphasia, speech is fluent. Visual acuity grossly intact, visual fields full, extraocular movements intact. No facial asymmetry. Hearing is grossly intact. No drift with 5/5 proximal and distal strength in all extremities. Sensation is intact. No nystagmus, no dysmetria, intact coordination. Language and comprehension are intact.   Psych: Appropriate mood and affect    ED Course & Medical Decision Making     He was initially evaluated by the provider in triage and workup initiated. I assumed care of the patient once he was moved back to the main ED around 1230pm.    79 y.o. male with history including hypertension, hyperlipidemia, CAD status post remote PCI, AVR on Eliquis presenting to the emergency department for a headache that started insidiously at the top of his head a few days ago, feels \"off\", a  transient episode of right peripheral vision lost 3 nights ago that resolved after 15 minutes. Also reports a year of dizziness and unsteadiness. He's well-appearing, no distress. Mentating appropriately with fluent speech. No appreciable focal deficits or signs to suggest posterior stroke at this time.     See ED course for details.     Social Determinants Limiting Care: None identified    EKG: See ED course.     Results: Independently reviewed and interpreted by me. Please see ED course and [facility] for my full interpretation.     Chronic Medical Conditions Significantly Affecting Care: as per [facility]    Patient was discussed with the following consultants/services: obs team    Care Considerations: as per [facility]    ED Course as of 01/01/25 1338   Mon Dec 30, 2024   1235 EKG per my interpretation reveals sinus rhythm, occasional PVC, rate 62, normal axis, normal intervals, no significant ST elevation/depression or T wave abnormalities.    His labs " are notable for low normal magnesium, troponin of 256, mild leukopenia without neutropenia. Viral swabs are negative and UA unremarkable. He adamantly denies any chest pain.  [LM]   1402 Repeat troponin slightly down trended to 236. CT head shows chronic but no acute strokes. I discussed results with the patient and family. Given his new troponin elevation and transient vision change, ACS and TIA, respectively, are a concern. He does have dizziness but this has been present for a year and he's undergone workup. No obvious clinical signs of posterior stroke at this time to warrant urgent vascular imaging in the ED but could benefit from this patient. He remains overall stable. Recommended admission and he's in agreement.  [LM]      ED Course User Index  [LM] Mora Swann MD         Diagnoses as of 01/01/25 1338   Acute nonintractable headache, unspecified headache type   Elevated troponin   Dizziness       Disposition   Admission       MD Mora Thompson MD  01/01/25 1338     no acute osseous pathology DISPLAY PLAN FREE TEXT

## 2024-12-30 NOTE — ED TRIAGE NOTES
"Pt arrives to ED with c/o headache that has been going on for about 3 days. Pt denies head injury. Pt states he has pain on the top part of his head. Pt states he is on a blood thinner. Pt states he also has been feeling \"off\" for a few months and states he has been to his PCP and ED and they can not find anything. Pt states he has felt off balance. Pt ambulatory with steady gait.   "

## 2024-12-31 ENCOUNTER — APPOINTMENT (OUTPATIENT)
Dept: RADIOLOGY | Facility: HOSPITAL | Age: 79
End: 2024-12-31
Payer: MEDICARE

## 2024-12-31 LAB
EST. AVERAGE GLUCOSE BLD GHB EST-MCNC: 131 MG/DL
GLUCOSE BLD MANUAL STRIP-MCNC: 104 MG/DL (ref 74–99)
GLUCOSE BLD MANUAL STRIP-MCNC: 117 MG/DL (ref 74–99)
GLUCOSE BLD MANUAL STRIP-MCNC: 126 MG/DL (ref 74–99)
HBA1C MFR BLD: 6.2 %
TSH SERPL-ACNC: 2.07 MIU/L (ref 0.44–3.98)

## 2024-12-31 PROCEDURE — 96365 THER/PROPH/DIAG IV INF INIT: CPT

## 2024-12-31 PROCEDURE — 70547 MR ANGIOGRAPHY NECK W/O DYE: CPT

## 2024-12-31 PROCEDURE — 96366 THER/PROPH/DIAG IV INF ADDON: CPT

## 2024-12-31 PROCEDURE — 82947 ASSAY GLUCOSE BLOOD QUANT: CPT

## 2024-12-31 PROCEDURE — 2500000002 HC RX 250 W HCPCS SELF ADMINISTERED DRUGS (ALT 637 FOR MEDICARE OP, ALT 636 FOR OP/ED): Mod: MUE | Performed by: NURSE PRACTITIONER

## 2024-12-31 PROCEDURE — 70551 MRI BRAIN STEM W/O DYE: CPT

## 2024-12-31 PROCEDURE — 99232 SBSQ HOSP IP/OBS MODERATE 35: CPT

## 2024-12-31 PROCEDURE — 70544 MR ANGIOGRAPHY HEAD W/O DYE: CPT | Mod: 59

## 2024-12-31 PROCEDURE — 2500000004 HC RX 250 GENERAL PHARMACY W/ HCPCS (ALT 636 FOR OP/ED)

## 2024-12-31 PROCEDURE — 70551 MRI BRAIN STEM W/O DYE: CPT | Performed by: RADIOLOGY

## 2024-12-31 PROCEDURE — G0378 HOSPITAL OBSERVATION PER HR: HCPCS

## 2024-12-31 PROCEDURE — 2500000001 HC RX 250 WO HCPCS SELF ADMINISTERED DRUGS (ALT 637 FOR MEDICARE OP): Performed by: NURSE PRACTITIONER

## 2024-12-31 PROCEDURE — 99223 1ST HOSP IP/OBS HIGH 75: CPT | Performed by: PSYCHIATRY & NEUROLOGY

## 2024-12-31 RX ORDER — MAGNESIUM SULFATE HEPTAHYDRATE 40 MG/ML
2 INJECTION, SOLUTION INTRAVENOUS ONCE
Status: COMPLETED | OUTPATIENT
Start: 2024-12-31 | End: 2024-12-31

## 2024-12-31 RX ADMIN — PANTOPRAZOLE SODIUM 40 MG: 40 TABLET, DELAYED RELEASE ORAL at 08:51

## 2024-12-31 RX ADMIN — EZETIMIBE 10 MG: 10 TABLET ORAL at 20:19

## 2024-12-31 RX ADMIN — FINASTERIDE 5 MG: 5 TABLET, FILM COATED ORAL at 08:52

## 2024-12-31 RX ADMIN — POTASSIUM CHLORIDE 20 MEQ: 1500 TABLET, EXTENDED RELEASE ORAL at 08:52

## 2024-12-31 RX ADMIN — ATORVASTATIN CALCIUM 80 MG: 80 TABLET, FILM COATED ORAL at 20:19

## 2024-12-31 RX ADMIN — RIVAROXABAN 20 MG: 20 TABLET, FILM COATED ORAL at 17:42

## 2024-12-31 RX ADMIN — MAGNESIUM SULFATE HEPTAHYDRATE 2 G: 40 INJECTION, SOLUTION INTRAVENOUS at 08:52

## 2024-12-31 RX ADMIN — ACETAMINOPHEN 650 MG: 325 TABLET, FILM COATED ORAL at 03:28

## 2024-12-31 RX ADMIN — ASPIRIN 81 MG: 81 TABLET, COATED ORAL at 08:51

## 2024-12-31 RX ADMIN — CHLORTHALIDONE 25 MG: 25 TABLET ORAL at 08:52

## 2024-12-31 RX ADMIN — LOSARTAN POTASSIUM 100 MG: 50 TABLET, FILM COATED ORAL at 08:51

## 2024-12-31 ASSESSMENT — COGNITIVE AND FUNCTIONAL STATUS - GENERAL
MOBILITY SCORE: 24
DAILY ACTIVITIY SCORE: 24

## 2024-12-31 ASSESSMENT — PAIN - FUNCTIONAL ASSESSMENT: PAIN_FUNCTIONAL_ASSESSMENT: 0-10

## 2024-12-31 ASSESSMENT — PAIN SCALES - GENERAL
PAINLEVEL_OUTOF10: 0 - NO PAIN
PAINLEVEL_OUTOF10: 0 - NO PAIN

## 2024-12-31 NOTE — CONSULTS
"Consults    History Of Present Illness  Mr. Forte is a 78 year old RH Man, with a PMH of HTN, BPH, CAD s/p stenting, afib on xarelto, s/p AVR, right cervical radiculopathy, lumbar radiculopathy, s/p Bilateral C7 pedicle screw fractures, provoked multiple pulmonary embolism s/p TKA 2010, GERD, ED, adrenal adenoma.  He presented to [facility] ED yesterday for headache that started insidiously at the top of his head a few days ago. Denies any obvious inciting event. It's persisted but has started to improve while here. He also states he had a transient episode of right peripheral vision loss 3 nights ago. Has had this happen in his left eye in the past but never his right. This resolved after about 15 minutes and hasn't recurred. Did not directly correlate with headache onset. Additionally, he reports chronic dizziness for years and unsteadiness and has felt \"off\" for a few months. Family notes he's undergone extensive workup for this without clear cause. He denies any significant change recently.   He has history of multiple admissions with recurrent strokes and was seen by Dr. Sharma in 6/2024 in stroke prevention clinic.  2020- Admitted with diplopia.  MRI- L midbrain lacunar infarct.  Rx DAPT.   Readmitted with worsening visual focusing with residual gait instability.  MRI- 6mm L mesial cerebellar infarct with resolving prior brainstem infarct. Continued DAPT.   3/2022- Neurology ([facility])- presented with 6wk of dizziness and worsening of residual gait instability without improvement.  MRI punctate infarcts in posterior circulation.  MRA head/neck- dominant RV, no significant stenoses or LVO. Was in the process of being transitioned from plavix to aspirin for hernia surgery at the time, after MRI was switched from antiplatelet monotherapy to plavix + xarelto.      6/21/24- Consult for dizziness for several months.  Saw PCP, Ct/ MRI- no new changes, EKG- NSR.    Today:  main complaint is feeling dizzy \"I feel high\" and off " balance, has to be very careful, does not use a device and no falls.  This never resolved from prior stroke, no new events.  PCP sent to PT and wants to go back, will schedule.   Nocturnal positional paresthesias RUE>LUE resolve with shaking/ rubbing interferes with sleep- had LCTS, told he needed on R but didn't pursue. Not interested in surgery, would consider a splint though.   Chronic back pain since 2010, sometimes goes into RLE with prolonged standing > walking.  MRI 2014- LCS L4-5 primarily, told he should have surgery but didn't pursue. Not interested in any spine surgery.   Vascular risk factors- CAD s/p OSH DM HTN, HPL   Past Medical History  Past Medical History:   Diagnosis Date    Cerebral infarction, unspecified 07/22/2020    Brainstem stroke    Encounter for screening for malignant neoplasm of rectum     Encounter for screening for malignant neoplasm of rectum    Impacted cerumen, bilateral 12/05/2016    Impacted cerumen of both ears    Inguinal hernia 02/07/2023    Nocturia     Nocturia    Other conditions influencing health status     Screening for malignant neoplasms, colon    Other spondylosis with myelopathy, cervical region 02/07/2023    Personal history of other diseases of male genital organs 02/15/2019    History of chronic prostatitis    Personal history of other diseases of the circulatory system 04/08/2015    History of angina pectoris    Personal history of other diseases of the respiratory system 10/14/2015    History of influenza    Personal history of other drug therapy 12/11/2014    History of pneumococcal vaccination    Personal history of other specified conditions 02/06/2019    History of odynophagia    Personal history of other specified conditions     History of edema    Radiculopathy, cervical region     Cervical radiculopathy    Stenosis, cervical spine 02/07/2023    Unspecified hemorrhoids     Bleeding hemorrhoids     Surgical History  Past Surgical History:   Procedure  Laterality Date    CERVICAL FUSION  10/03/2013    Cervical Vertebral Fusion    KNEE SURGERY  2014    Knee Surgery    MR HEAD ANGIO WO IV CONTRAST  2020    MR HEAD ANGIO WO IV CONTRAST 2020 [facility]    MR HEAD ANGIO WO IV CONTRAST  10/30/2022    MR HEAD ANGIO WO IV CONTRAST 10/30/2022 [facility]    MR NECK ANGIO WO IV CONTRAST  2020    MR NECK ANGIO WO IV CONTRAST 2020 [facility]    MR NECK ANGIO WO IV CONTRAST  10/30/2022    MR NECK ANGIO WO IV CONTRAST 10/30/2022 [facility]    OTHER SURGICAL HISTORY  2019    Tonsillectomy    OTHER SURGICAL HISTORY  2019    Adenoidectomy    OTHER SURGICAL HISTORY  2020    Aortic valve replacement    PILONIDAL CYST DRAINAGE  2014    Pilonidal Cyst Resection     Social History  Social History     Tobacco Use    Smoking status: Former     Current packs/day: 0.00     Types: Cigarettes     Quit date:      Years since quittin.0     Passive exposure: Never    Smokeless tobacco: Never   Substance Use Topics    Alcohol use: Yes     Comment: OCASSIONALLY    Drug use: Never     Allergies  Iodinated contrast media and Latex  Medications Prior to Admission   Medication Sig Dispense Refill Last Dose/Taking    aspirin 81 mg EC tablet Take 1 tablet (81 mg) by mouth once daily.   2024 Morning    atorvastatin (Lipitor) 80 mg tablet TAKE 1 TABLET EVERY DAY 90 tablet 1 2024    chlorthalidone (Hygroton) 25 mg tablet TAKE 1 TABLET EVERY DAY 90 tablet 1 2024    ezetimibe (Zetia) 10 mg tablet TAKE 1 TABLET EVERY DAY 90 tablet 1 2024    finasteride (Proscar) 5 mg tablet TAKE 1 TABLET ONE TIME DAILY 90 tablet 1 2024    losartan (Cozaar) 100 mg tablet Take 1 tablet (100 mg) by mouth once daily. 90 tablet 1 2024    metFORMIN XR (Glucophage-XR) 750 mg 24 hr tablet Take 1 tablet (750 mg) by mouth once daily in the evening. Take with meals. Do not crush, chew, or split. 90  tablet 1 12/29/2024    pantoprazole (ProtoNix) 40 mg EC tablet TAKE 1 TABLET EVERY MORNING BEFORE A MEAL. DO NOT CRUSH, CHEW, OR SPLIT. (Patient taking differently: Take 1 tablet (40 mg) by mouth once daily.) 90 tablet 1 12/29/2024    potassium chloride CR 20 mEq ER tablet TAKE 1 TABLET EVERY DAY 60 tablet 5 12/29/2024    rivaroxaban (Xarelto) 20 mg tablet Take 1 tablet (20 mg) by mouth once daily in the evening. Take with meals. 90 tablet 3 12/29/2024    spironolactone (Aldactone) 25 mg tablet Take 1 tablet (25 mg) by mouth once daily. 30 tablet 11 12/29/2024    blood sugar diagnostic strip 1 strip 2 times a day as needed (Use to test blood glucose 1-2 times daily). 50 strip 3     blood-glucose meter misc Use twice per day PRN 1 each 0     lancets misc 1 Lancet 2 times a day as needed (Use to check blood glucose 1-2 times daily). Use twice daily PRN 50 each 3        Review of Systems  Neurological Exam  Physical Exam  Mental Status Examination:  General appearance: Well-groomed, good eye contact, cooperative  Orientation: Alert and oriented to person, place, and time  Motor: no psychomotor agitation or retardation  Speech: regular rate, rhythm, tone, and volume    NEUROLOGICAL EXAMINATION    Cranial nerves:  CN II: visual fields full to confrontation  CN III, IV, VI: Pupils round, reactive to light and accommodation.  Lids symmetric.  No ptosis.  Extra-occular muscles are intact with normal alignment.  No nystagmus  CN V: Facial sensation intact bilaterally.    CN VII: Normal and symmetric facial strength.  Nasolabial folds symmetric  CN VIII: Hearing intact to finger rub  CN IX: Palate elevates symmetrically.  CN XI: Normal shoulder shrug and neck turning  CN XII: Tongue midline, with normal bulk and strength, no fasciculations        Motor:  Muscle bulk was normal in all extremities.  5/5 strength in all extremities  Normal finger taps and hand opening  Normal tone  No abnormal movements    Reflexes:   Right UE    "  LEFT UE  BR: 1          BR: 1  Biceps: 1    Biceps: 1  Triceps: 1   Triceps: 1    RIGHT LE     LEFT LE  Knee: 1        Knee: 1  Ankle: 0       Ankle: 0    Negative Eber's reflex  No frontal release signs    Coordination:  Normal finger to nose testing and rapid alternating movements    Gait:  Deferred due to concern about patient's safety    Sensory:  Normal to light tough  Last Recorded Vitals  Blood pressure 131/71, pulse 66, temperature 36.2 °C (97.2 °F), temperature source Temporal, resp. rate 17, height 1.791 m (5' 10.5\"), weight 109 kg (240 lb 11.9 oz), SpO2 96%.    Relevant Results    Results for orders placed or performed during the hospital encounter of 12/30/24 (from the past 24 hours)   Transthoracic Echo (TTE) Limited   Result Value Ref Range    LV EF 70 %    LVIDd 3.81 cm    RVSP 30.4 mmHg    LV A4C EF 67.2    POCT GLUCOSE   Result Value Ref Range    POCT Glucose 107 (H) 74 - 99 mg/dL   POCT GLUCOSE   Result Value Ref Range    POCT Glucose 104 (H) 74 - 99 mg/dL   POCT GLUCOSE   Result Value Ref Range    POCT Glucose 117 (H) 74 - 99 mg/dL   POCT GLUCOSE   Result Value Ref Range    POCT Glucose 126 (H) 74 - 99 mg/dL     *Note: Due to a large number of results and/or encounters for the requested time period, some results have not been displayed. A complete set of results can be found in Results Review.      MR brain wo IV contrast    Result Date: 12/31/2024  Interpreted By:  Jovany Brennan, STUDY: MR BRAIN WO IV CONTRAST;  12/31/2024 1:10 pm   INDICATION: Signs/Symptoms:transient peripheral vision loss.     COMPARISON: MRI March 28, 2024.   ACCESSION NUMBER(S): [id number]   ORDERING CLINICIAN: JAIME SILVERMAN   TECHNIQUE: Axial T2, FLAIR, DWI, gradient echo T2 and sagittal and coronal T1 weighted images of brain were acquired.   FINDINGS: Multiple scattered small foci of abnormally restricted diffusion consistent with acute to subacute infarctions including left external capsule, right putamen and " external capsule, left thalamus, left periatrial white matter, cortex and juxtacortical white matter of both cerebral hemispheres, right cerebral peduncle, paramedian right dotty, and cerebellar vermis .   No acute intracranial hemorrhage.     No extra-axial fluid collection. No intracranial mass. Major vascular flow voids intact.   Scattered supratentorial FLAIR hyperintense foci are present predominantly involving the deep and periventricular white matter. These in part likely represent older infarctions and probably coexistent mild chronic small-vessel ischemic change.   Mild paranasal sinus/ethmoid mucosal inflammatory changes. No significant nonspecific mastoid fluid. Orbital contents unremarkable. No destructive osseous lesions identified.   Fusiform thickening of the ventral epidural space of the upper cervical spine particularly at the C1 and C2 levels causes at least moderate central canal stenosis at those levels. This likely represents degenerative pannus formation.       1. Multiple scattered small foci of abnormally restricted diffusion consistent with acute to subacute infarctions including left external capsule, right putamen and external capsule, left thalamus, left periatrial white matter, cortex and juxtacortical white matter of both cerebral hemispheres, right cerebral peduncle, paramedian right dotty, and cerebellar vermis. Findings suggest an extracranial embolic source, possibly aorta or heart. 2. Scattered supratentorial FLAIR hyperintense foci are present predominantly involving the deep and periventricular white matter. These in part likely represent older infarctions and probably coexistent mild chronic small-vessel ischemic change. A primary demyelinating disease processes considered less likely.   MACRO: None   Signed by: Jovany Brennan 12/31/2024 1:52 PM Dictation workstation:   [id number]    Transthoracic Echo (TTE) Limited    Result Date: 12/30/2024   [facility], [address] [address]              Tel [phone] and Fax [phone] TRANSTHORACIC ECHOCARDIOGRAM REPORT  Patient Name:       BIRTTA CRUZITO FAN   Reading Physician:    [id number] Stephen Pulido MD Study Date:         12/30/2024          Ordering Provider:    [id number] JAIME SILVERMAN MRN/PID:            [id number]            Fellow: Accession#:         [id number]        Nurse: Date of Birth/Age:  1945 / 79     Sonographer:          Hermila Mac RDCS                     years Gender assigned at  M                   Additional Staff: Birth: Height:             177.80 cm           Admit Date:           12/30/2024 Weight:             103.87 kg           Admission Status:     Inpatient -                                                               Priority                                                               discharge BSA / BMI:          2.21 m2 / 32.86     Encounter#:           [id number]                     kg/m2 Blood Pressure:     137/65 mmHg         Department Location:  [facility] Non                                                               Invasive Study Type:    TRANSTHORACIC ECHO (TTE) LIMITED Diagnosis/ICD: Cerebrovascular disease, unspecified-I67.9 Indication:    Transischemic Attack CPT Code:      Echo Limited-70468 Patient History: Diabetes:          Yes Pertinent History: A-Fib, CAD, CVA, PE, HTN and Hyperlipidemia. S/P AVR 25mm                    freestyle stentless AV bioprosthesis. Study Detail: The following Echo studies were performed: 2D, M-Mode, Doppler and               color flow. Agitated saline used as a contrast agent for               intraseptal flow evaluation.  PHYSICIAN INTERPRETATION: Left Ventricle: Left ventricular ejection fraction is normal, by visual estimate at 70%. There are no regional left ventricular wall motion abnormalities.  The left ventricular cavity size is normal. There is normal septal and mildly increased posterior left ventricular wall thickness. There is left ventricular concentric remodeling. Left ventricular diastolic filling was not assessed. Left Atrium: The left atrium was not assessed. A bubble study using agitated saline was performed. Bubble study is negative. Right Ventricle: The right ventricle was not assessed. Right ventricular systolic function not assessed. Right Atrium: The right atrium was not assessed. Aortic Valve: There is a prosthetic aortic valve present. There is Freestyle bioprosthetic type aortic valve bioprosthesis with a 25 mm reported size. There is no evidence of aortic valve regurgitation. Mitral Valve: The mitral valve is normal in structure. There is severe mitral annular calcification. There is mild mitral valve regurgitation. Tricuspid Valve: The tricuspid valve is structurally normal. There is mild tricuspid regurgitation. The Doppler estimated RVSP is slightly elevated at 30.4 mmHg. Pulmonic Valve: The pulmonic valve is not well visualized. There is no indication of pulmonic valve regurgitation. Pericardium: There is no pericardial effusion noted. Aorta: The aortic root was not assessed. In comparison to the previous echocardiogram(s): Compared with study dated 9/3/2024, no significant change.  CONCLUSIONS:  1. Left ventricular ejection fraction is normal, by visual estimate at 70%.  2. There is severe mitral annular calcification.  3. Slightly elevated right ventricular systolic pressure.  4. There is Freestyle bioprosthetic type aortic valve bioprosthesis with a 25 mm reported size.  5. A bubble study using agitated saline was performed. Bubble study is negative. QUANTITATIVE DATA SUMMARY:  2D MEASUREMENTS:          Normal Ranges: IVSd:            1.01 cm  (0.6-1.1cm) LVPWd:           1.17 cm  (0.6-1.1cm) LVIDd:           3.81 cm  (3.9-5.9cm) LVIDs:           2.69 cm LV Mass Index:   60 g/m2  LVEDV Index:     23 ml/m2 LV % FS          29.3 %  LV SYSTOLIC FUNCTION BY 2D PLANIMETRY (MOD):                      Normal Ranges: EF-A4C View:    67 % (>=55%) EF-A2C View:    62 % EF-Biplane:     66 % EF-Visual:      70 % LV EF Reported: 70 %  TRICUSPID VALVE/RVSP:          Normal Ranges: Peak TR Velocity:     2.62 m/s RV Syst Pressure:     30 mmHg  (< 30mmHg)  [id number] Stephen Pulido MD Electronically signed on 12/30/2024 at 6:19:54 PM  ** Final **     XR chest 2 views    Result Date: 12/30/2024  STUDY: Chest Radiographs;  [12-; 11:40 am] INDICATION: Weakness. COMPARISON: XR chest 3- ACCESSION NUMBER(S): [id number] ORDERING CLINICIAN: ANGELICA HO TECHNIQUE:  Frontal and lateral chest. FINDINGS: CARDIOMEDIASTINAL SILHOUETTE: Cardiomediastinal silhouette is normal in size and configuration.  LUNGS: Lungs are clear.  ABDOMEN: No remarkable upper abdominal findings.  BONES: No acute osseous changes.  Mediastinum wires are visualized.    No acute cardiopulmonary disease.. Signed by Jagdish Rose MD    CT head wo IV contrast    Result Date: 12/30/2024  Interpreted By:  Hai Mariscal, STUDY: CT HEAD WO IV CONTRAST;  12/30/2024 11:29 am   INDICATION: Signs/Symptoms:Headache with vertigo negative MRI of the head and MRA of the head and neck from March of this year.     COMPARISON: 03/18/2024   ACCESSION NUMBER(S): [id number]   ORDERING CLINICIAN: ANGELICA HO   TECHNIQUE: Noncontrast axial CT images of head were obtained with coronal and sagittal reconstructed images.   FINDINGS: BRAIN PARENCHYMA: Redemonstration of a chronic subcentimeter linear lacunar infarct in the left cerebellar hemisphere. Redemonstration of a tiny chronic lacunar infarct in the right centrum semi ovale, left putamen, and head of the right caudate nucleus. No acute intraparenchymal hemorrhage or parenchymal evidence of acute large territory ischemic infarct. Gray-white matter distinction is preserved. No mass-effect.    VENTRICLES and EXTRA-AXIAL SPACES:  No acute extra-axial or intraventricular hemorrhage. No effacement of cerebral sulci. The ventricles and sulci are age-concordant.   PARANASAL SINUSES/MASTOIDS:  No hemorrhage or air-fluid levels within the visualized paranasal sinuses. The mastoids are well aerated.   CALVARIUM/ORBITS:  No skull fracture.  The orbits and globes are intact to the extent visualized.   EXTRACRANIAL SOFT TISSUES: No discernible abnormality.       No acute intracranial abnormality.   Redemonstration of scattered chronic lacunar infarcts in the bilateral basal ganglia, right centrum semi ovale and left cerebellar hemisphere.   MACRO: None.   Signed by: Hai Mariscal 12/30/2024 12:08 PM Dictation workstation:   [id number]    ECG 12 lead    Result Date: 12/30/2024  Sinus rhythm with occasional Premature ventricular complexes Otherwise normal ECG When compared with ECG of 04-DEC-2024 12:21, Premature ventricular complexes are now Present Vent. rate has decreased BY  31 BPM    ECG 12 lead (Clinic Performed)    Result Date: 12/5/2024  Normal sinus rhythm Prolonged QT Abnormal ECG Confirmed by Freddie Giraldo ([id number]) on 12/5/2024 1:08:49 PM      Scheduled medications  aspirin, 81 mg, oral, Daily  atorvastatin, 80 mg, oral, Nightly  chlorthalidone, 25 mg, oral, Daily  ezetimibe, 10 mg, oral, Nightly  finasteride, 5 mg, oral, Daily  insulin lispro, 0-10 Units, subcutaneous, TID AC  losartan, 100 mg, oral, Daily  pantoprazole, 40 mg, oral, Daily before breakfast  potassium chloride CR, 20 mEq, oral, Daily  rivaroxaban, 20 mg, oral, Daily with evening meal  sennosides-docusate sodium, 2 tablet, oral, BID      Continuous medications     PRN medications  PRN medications: acetaminophen **OR** acetaminophen **OR** acetaminophen, meclizine         Bev Coma Scale  Best Eye Response: Spontaneous  Best Verbal Response: Oriented  Best Motor Response: Follows commands  [place] Coma Scale Score: 15     I have  personally reviewed the following imaging results MR brain wo IV contrast    Result Date: 12/31/2024  Interpreted By:  Jovany Brennan, STUDY: MR BRAIN WO IV CONTRAST;  12/31/2024 1:10 pm   INDICATION: Signs/Symptoms:transient peripheral vision loss.     COMPARISON: MRI March 28, 2024.   ACCESSION NUMBER(S): [id number]   ORDERING CLINICIAN: JAIME SILVERMAN   TECHNIQUE: Axial T2, FLAIR, DWI, gradient echo T2 and sagittal and coronal T1 weighted images of brain were acquired.   FINDINGS: Multiple scattered small foci of abnormally restricted diffusion consistent with acute to subacute infarctions including left external capsule, right putamen and external capsule, left thalamus, left periatrial white matter, cortex and juxtacortical white matter of both cerebral hemispheres, right cerebral peduncle, paramedian right dotty, and cerebellar vermis .   No acute intracranial hemorrhage.     No extra-axial fluid collection. No intracranial mass. Major vascular flow voids intact.   Scattered supratentorial FLAIR hyperintense foci are present predominantly involving the deep and periventricular white matter. These in part likely represent older infarctions and probably coexistent mild chronic small-vessel ischemic change.   Mild paranasal sinus/ethmoid mucosal inflammatory changes. No significant nonspecific mastoid fluid. Orbital contents unremarkable. No destructive osseous lesions identified.   Fusiform thickening of the ventral epidural space of the upper cervical spine particularly at the C1 and C2 levels causes at least moderate central canal stenosis at those levels. This likely represents degenerative pannus formation.       1. Multiple scattered small foci of abnormally restricted diffusion consistent with acute to subacute infarctions including left external capsule, right putamen and external capsule, left thalamus, left periatrial white matter, cortex and juxtacortical white matter of both cerebral hemispheres,  right cerebral peduncle, paramedian right dotty, and cerebellar vermis. Findings suggest an extracranial embolic source, possibly aorta or heart. 2. Scattered supratentorial FLAIR hyperintense foci are present predominantly involving the deep and periventricular white matter. These in part likely represent older infarctions and probably coexistent mild chronic small-vessel ischemic change. A primary demyelinating disease processes considered less likely.   MACRO: None   Signed by: Jovany Brennan 12/31/2024 1:52 PM Dictation workstation:   [id number]    Transthoracic Echo (TTE) Limited    Result Date: 12/30/2024   [facility], [address] [place] [address]              Tel [phone] and Fax [phone] TRANSTHORACIC ECHOCARDIOGRAM REPORT  Patient Name:       BRITTA FAN   Reading Physician:    [id number] Stephen Pulido MD Study Date:         12/30/2024          Ordering Provider:    [id number] JAIME SILVERMAN MRN/PID:            [id number]            Fellow: Accession#:         [id number]        Nurse: Date of Birth/Age:  1945 / 79     Sonographer:          Hermila Mac RDCS                     years Gender assigned at  M                   Additional Staff: Birth: Height:             177.80 cm           Admit Date:           12/30/2024 Weight:             103.87 kg           Admission Status:     Inpatient -                                                               Priority                                                               discharge BSA / BMI:          2.21 m2 / 32.86     Encounter#:           [id number]                     kg/m2 Blood Pressure:     137/65 mmHg         Department Location:  [facility] Non                                                               Invasive Study Type:    TRANSTHORACIC ECHO (TTE) LIMITED Diagnosis/ICD: Cerebrovascular  disease, unspecified-I67.9 Indication:    Transischemic Attack CPT Code:      Echo Limited-24692 Patient History: Diabetes:          Yes Pertinent History: A-Fib, CAD, CVA, PE, HTN and Hyperlipidemia. S/P AVR 25mm                    freestyle stentless AV bioprosthesis. Study Detail: The following Echo studies were performed: 2D, M-Mode, Doppler and               color flow. Agitated saline used as a contrast agent for               intraseptal flow evaluation.  PHYSICIAN INTERPRETATION: Left Ventricle: Left ventricular ejection fraction is normal, by visual estimate at 70%. There are no regional left ventricular wall motion abnormalities. The left ventricular cavity size is normal. There is normal septal and mildly increased posterior left ventricular wall thickness. There is left ventricular concentric remodeling. Left ventricular diastolic filling was not assessed. Left Atrium: The left atrium was not assessed. A bubble study using agitated saline was performed. Bubble study is negative. Right Ventricle: The right ventricle was not assessed. Right ventricular systolic function not assessed. Right Atrium: The right atrium was not assessed. Aortic Valve: There is a prosthetic aortic valve present. There is Freestyle bioprosthetic type aortic valve bioprosthesis with a 25 mm reported size. There is no evidence of aortic valve regurgitation. Mitral Valve: The mitral valve is normal in structure. There is severe mitral annular calcification. There is mild mitral valve regurgitation. Tricuspid Valve: The tricuspid valve is structurally normal. There is mild tricuspid regurgitation. The Doppler estimated RVSP is slightly elevated at 30.4 mmHg. Pulmonic Valve: The pulmonic valve is not well visualized. There is no indication of pulmonic valve regurgitation. Pericardium: There is no pericardial effusion noted. Aorta: The aortic root was not assessed. In comparison to the previous echocardiogram(s): Compared with study  dated 9/3/2024, no significant change.  CONCLUSIONS:  1. Left ventricular ejection fraction is normal, by visual estimate at 70%.  2. There is severe mitral annular calcification.  3. Slightly elevated right ventricular systolic pressure.  4. There is Freestyle bioprosthetic type aortic valve bioprosthesis with a 25 mm reported size.  5. A bubble study using agitated saline was performed. Bubble study is negative. QUANTITATIVE DATA SUMMARY:  2D MEASUREMENTS:          Normal Ranges: IVSd:            1.01 cm  (0.6-1.1cm) LVPWd:           1.17 cm  (0.6-1.1cm) LVIDd:           3.81 cm  (3.9-5.9cm) LVIDs:           2.69 cm LV Mass Index:   60 g/m2 LVEDV Index:     23 ml/m2 LV % FS          29.3 %  LV SYSTOLIC FUNCTION BY 2D PLANIMETRY (MOD):                      Normal Ranges: EF-A4C View:    67 % (>=55%) EF-A2C View:    62 % EF-Biplane:     66 % EF-Visual:      70 % LV EF Reported: 70 %  TRICUSPID VALVE/RVSP:          Normal Ranges: Peak TR Velocity:     2.62 m/s RV Syst Pressure:     30 mmHg  (< 30mmHg)  [id number] Stephen Pulido MD Electronically signed on 12/30/2024 at 6:19:54 PM  ** Final **     XR chest 2 views    Result Date: 12/30/2024  STUDY: Chest Radiographs;  [12-; 11:40 am] INDICATION: Weakness. COMPARISON: XR chest 3- ACCESSION NUMBER(S): [id number] ORDERING CLINICIAN: ANGELICA HO TECHNIQUE:  Frontal and lateral chest. FINDINGS: CARDIOMEDIASTINAL SILHOUETTE: Cardiomediastinal silhouette is normal in size and configuration.  LUNGS: Lungs are clear.  ABDOMEN: No remarkable upper abdominal findings.  BONES: No acute osseous changes.  Mediastinum wires are visualized.    No acute cardiopulmonary disease.. Signed by Jagdish Rose MD    CT head wo IV contrast    Result Date: 12/30/2024  Interpreted By:  Hai Mariscal, STUDY: CT HEAD WO IV CONTRAST;  12/30/2024 11:29 am   INDICATION: Signs/Symptoms:Headache with vertigo negative MRI of the head and MRA of the head and neck from March of this  year.     COMPARISON: 03/18/2024   ACCESSION NUMBER(S): [id number]   ORDERING CLINICIAN: ANGELICA HO   TECHNIQUE: Noncontrast axial CT images of head were obtained with coronal and sagittal reconstructed images.   FINDINGS: BRAIN PARENCHYMA: Redemonstration of a chronic subcentimeter linear lacunar infarct in the left cerebellar hemisphere. Redemonstration of a tiny chronic lacunar infarct in the right centrum semi ovale, left putamen, and head of the right caudate nucleus. No acute intraparenchymal hemorrhage or parenchymal evidence of acute large territory ischemic infarct. Gray-white matter distinction is preserved. No mass-effect.   VENTRICLES and EXTRA-AXIAL SPACES:  No acute extra-axial or intraventricular hemorrhage. No effacement of cerebral sulci. The ventricles and sulci are age-concordant.   PARANASAL SINUSES/MASTOIDS:  No hemorrhage or air-fluid levels within the visualized paranasal sinuses. The mastoids are well aerated.   CALVARIUM/ORBITS:  No skull fracture.  The orbits and globes are intact to the extent visualized.   EXTRACRANIAL SOFT TISSUES: No discernible abnormality.       No acute intracranial abnormality.   Redemonstration of scattered chronic lacunar infarcts in the bilateral basal ganglia, right centrum semi ovale and left cerebellar hemisphere.   MACRO: None.   Signed by: Hai Mariscal 12/30/2024 12:08 PM Dictation workstation:   [id number]    ECG 12 lead    Result Date: 12/30/2024  Sinus rhythm with occasional Premature ventricular complexes Otherwise normal ECG When compared with ECG of 04-DEC-2024 12:21, Premature ventricular complexes are now Present Vent. rate has decreased BY  31 BPM    ECG 12 lead (Clinic Performed)    Result Date: 12/5/2024  Normal sinus rhythm Prolonged QT Abnormal ECG Confirmed by Freddie Giraldo ([id number]) on 12/5/2024 1:08:49 PM  .      Assessment/Plan   Assessment & Plan  Acute CVA (cerebrovascular accident) (Multi)      Mr. Forte is a 78 year old  "Man, with a PMH of HTN, BPH, CAD s/p stenting, afib on xarelto, s/p AVR, right cervical radiculopathy, lumbar radiculopathy, s/p Bilateral C7 pedicle screw fractures, provoked multiple pulmonary embolism s/p TKA 2010, GERD, ED, adrenal adenoma.  He presented to [facility] ED yesterday for headache that started insidiously at the top of his head a few days ago. Denies any obvious inciting event. It's persisted but has started to improve while here. He also states he had a transient episode of right peripheral vision loss 3 nights ago associated with headache and resolved. Has had this happen in his left eye in the past but never his right. This resolved after about 15 minutes and hasn't recurred. Did not directly correlate with headache onset. Additionally, he reports chronic dizziness for years and unsteadiness and has felt \"off\" for a few months. Family notes he's undergone extensive workup for this without clear cause. He denies any significant change recently. MRI brain wo contrast on 12/30 showed multiple scattered small foci of abnormally restricted diffusion consistent with acute to subacute infarctions including left external capsule, right putamen and external capsule, left thalamus, left periatrial white matter, cortex and juxtacortical white matter of both cerebral hemispheres, right cerebral peduncle, paramedian right dotty, and cerebellar vermis. There is white matter hyperintense lesions consistent with microangiopathy, no acute intracranial hemorrhage. No extra-axial fluid collection. No intracranial mass. Major vascular flow voids intact.  Given the distribution of diffusion restriction, etiology is likely embolic.  Will obtain further stroke work-up. PT, OT.    Recommendations:  MRA H/N  TTE with bubble study  EKG  Telemetry monitoring  HbA1C, lipid panel  Continue with Xarelto, and ASA   Continue atorvastatin 80 mg and ezetimibe 10 mg  PT, OT  Consider Zio patch with discharge    Vascular Risk Factor " modification goals:  Blood pressure goals: avoid hypotension SBP <100 that could worsen cerebral perfusion, Ischemic stroke- early permissive hypertension SBP < 220 mmHg with cautious inpatient lowering  Lipid Goals: education on healthy diet and statin therapy to maintain or achieve goal LDL-cholesterol < 70mg  Glucose Goals: early treatment of hyperglycemia to goal glucose 140-180 mg/dl with long-term goal A1c < 7%   Smoking Cessation and Education  Assessment for Rehabilitation needs   Patient and family education on signs and symptoms of stroke, calling 911, healthy strategies for stroke prevention.            I spent 73 minutes in the professional and overall care of this patient.          Rodrick Antonio MD

## 2024-12-31 NOTE — PROGRESS NOTES
12/31/24 1016   Discharge Planning   Home or Post Acute Services None   Expected Discharge Disposition Home     Patient pending MRI brain.  Possible neuro consult.  Plan remains for patient to return home upon discharge. No home going needs identified at this time.  Will continue to follow for discharge planning needs.

## 2024-12-31 NOTE — CARE PLAN
Problem: Skin  Goal: Prevent/manage excess moisture  Outcome: Progressing  Flowsheets (Taken 12/30/2024 2300)  Prevent/manage excess moisture: Moisturize dry skin

## 2024-12-31 NOTE — DISCHARGE INSTRUCTIONS
A heart monitor has been ordered for you.  The heart monitor can be placed by [facility] during regular business hours (9AM-4PM Monday-Friday). Please call [phone].

## 2024-12-31 NOTE — HOSPITAL COURSE
"Cristy Forte is a 79 y.o. 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 He also had a loss or right peripheral vision x 15 minutes on Friday night while he was watching TV but resolved on its own in about 15 min. He has had peripheral vision loss in the past but in the left eye.   He has seen PT for \"imbalance\" in the recent weeks and had an echo done in September of 24 which showed,    1. Left ventricular ejection fraction is hyperdynamic, by visual estimate at 80%.   2. Spectral Doppler shows a pseudonormal pattern of left ventricular diastolic filling.   3. There is a mild mid cavity left ventricular obstruction.   4. There is normal right ventricular global systolic function.   5. The left atrium is moderately dilated.   6. There is severe mitral annular calcification.   7. Mildly elevated RVSP.   8. There is a bioprosthetic aortic valve.   9. Normal aortic valve prosthesis structure and function.  He had a negative stress test in 2018.  Had an MRI of the brain in 3/24 with no acute findings.   His work up in the ED showed NO acute findings with a negative CT of the head for any acute findings. His labs were significant for magnesium of 1.59 and troponin of 256->236  He was given  mg as well as Tylenol 975 mg.   On assessment, patient had NO headache, dizziness, blurry or double vision. He had NO chest pain and/or dyspnea despite his elevated troponin.   Denied orthopnea and no peripheral edema.     Observation Course:  Work up for dizziness included ESR, CRP, MRI brain ***        Discharge weight: ### kg    After " all labs and VS were reviewed the decision was made that the patient was medically stable for discharge.  The patient was discharged in satisfactory condition.    More than 30 minutes were spent in coordinating patient discharge.

## 2024-12-31 NOTE — PROGRESS NOTES
Cristy Forte is a 79 y.o. male on day 0 of admission presenting with TIA (transient ischemic attack).      Subjective   Cristy was seen and evaluated this morning. Patient sitting up on edge of bed. States he is feeling better today, headache has resolved. Patient states he is compliant with his Xarelto and aspirin. Patient notes he continues to feel off balance and have visual disturbances which have both been present for about a year. No further peripheral vision loss. No further dizziness.        Objective     Last Recorded Vitals  /71 (BP Location: Right arm, Patient Position: Lying)   Pulse 66   Temp 36.2 °C (97.2 °F) (Temporal)   Resp 17   Wt 109 kg (240 lb 11.9 oz)   SpO2 96%   Intake/Output last 3 Shifts:    Intake/Output Summary (Last 24 hours) at 12/31/2024 1613  Last data filed at 12/31/2024 0900  Gross per 24 hour   Intake --   Output 375 ml   Net -375 ml       Admission Weight  Weight: 104 kg (229 lb) (12/30/24 0846)    Daily Weight  12/30/24 : 109 kg (240 lb 11.9 oz)    Image Results  MR brain wo IV contrast  Narrative: Interpreted By:  Jovany Brennan,   STUDY:  MR BRAIN WO IV CONTRAST;  12/31/2024 1:10 pm      INDICATION:  Signs/Symptoms:transient peripheral vision loss.          COMPARISON:  MRI March 28, 2024.      ACCESSION NUMBER(S):  [id number]      ORDERING CLINICIAN:  JAIME SILVERMAN      TECHNIQUE:  Axial T2, FLAIR, DWI, gradient echo T2 and sagittal and coronal T1  weighted images of brain were acquired.      FINDINGS:  Multiple scattered small foci of abnormally restricted diffusion  consistent with acute to subacute infarctions including left external  capsule, right putamen and external capsule, left thalamus, left  periatrial white matter, cortex and juxtacortical white matter of  both cerebral hemispheres, right cerebral peduncle, paramedian right  dotty, and cerebellar vermis .      No acute intracranial hemorrhage.     No extra-axial fluid  collection. No intracranial  mass. Major vascular flow voids intact.      Scattered supratentorial FLAIR hyperintense foci are present  predominantly involving the deep and periventricular white matter.  These in part likely represent older infarctions and probably  coexistent mild chronic small-vessel ischemic change.      Mild paranasal sinus/ethmoid mucosal inflammatory changes. No  significant nonspecific mastoid fluid. Orbital contents unremarkable.  No destructive osseous lesions identified.      Fusiform thickening of the ventral epidural space of the upper  cervical spine particularly at the C1 and C2 levels causes at least  moderate central canal stenosis at those levels. This likely  represents degenerative pannus formation.      Impression: 1. Multiple scattered small foci of abnormally restricted diffusion  consistent with acute to subacute infarctions including left external  capsule, right putamen and external capsule, left thalamus, left  periatrial white matter, cortex and juxtacortical white matter of  both cerebral hemispheres, right cerebral peduncle, paramedian right  dotty, and cerebellar vermis. Findings suggest an extracranial embolic  source, possibly aorta or heart.  2. Scattered supratentorial FLAIR hyperintense foci are present  predominantly involving the deep and periventricular white matter.  These in part likely represent older infarctions and probably  coexistent mild chronic small-vessel ischemic change. A primary  demyelinating disease processes considered less likely.      MACRO:  None      Signed by: Jovany Brennan 12/31/2024 1:52 PM  Dictation workstation:   [id number]      Physical Exam  Vitals reviewed.   Constitutional:       General: He is not in acute distress.     Appearance: He is not ill-appearing.   Cardiovascular:      Rate and Rhythm: Normal rate and regular rhythm.      Heart sounds: Normal heart sounds.   Pulmonary:      Effort: Pulmonary effort is normal.      Breath sounds: Normal breath  sounds.   Abdominal:      General: Abdomen is flat. There is no distension.      Palpations: Abdomen is soft.      Tenderness: There is no abdominal tenderness.   Musculoskeletal:      Right lower leg: No edema.      Left lower leg: No edema.   Neurological:      Mental Status: He is alert and oriented to person, place, and time.   Psychiatric:         Mood and Affect: Mood normal.       Relevant Results  Scheduled medications  aspirin, 81 mg, oral, Daily  atorvastatin, 80 mg, oral, Nightly  chlorthalidone, 25 mg, oral, Daily  ezetimibe, 10 mg, oral, Nightly  finasteride, 5 mg, oral, Daily  insulin lispro, 0-10 Units, subcutaneous, TID AC  losartan, 100 mg, oral, Daily  pantoprazole, 40 mg, oral, Daily before breakfast  potassium chloride CR, 20 mEq, oral, Daily  rivaroxaban, 20 mg, oral, Daily with evening meal  sennosides-docusate sodium, 2 tablet, oral, BID      Continuous medications     PRN medications  PRN medications: acetaminophen **OR** acetaminophen **OR** acetaminophen, meclizine  Results for orders placed or performed during the hospital encounter of 12/30/24 (from the past 24 hours)   Transthoracic Echo (TTE) Limited   Result Value Ref Range    LV EF 70 %    LVIDd 3.81 cm    RVSP 30.4 mmHg    LV A4C EF 67.2    POCT GLUCOSE   Result Value Ref Range    POCT Glucose 107 (H) 74 - 99 mg/dL   POCT GLUCOSE   Result Value Ref Range    POCT Glucose 104 (H) 74 - 99 mg/dL   POCT GLUCOSE   Result Value Ref Range    POCT Glucose 117 (H) 74 - 99 mg/dL   POCT GLUCOSE   Result Value Ref Range    POCT Glucose 126 (H) 74 - 99 mg/dL     *Note: Due to a large number of results and/or encounters for the requested time period, some results have not been displayed. A complete set of results can be found in Results Review.     MR brain wo IV contrast    Result Date: 12/31/2024  Interpreted By:  Jovany Brennan, STUDY: MR BRAIN WO IV CONTRAST;  12/31/2024 1:10 pm   INDICATION: Signs/Symptoms:transient peripheral vision loss.      COMPARISON: MRI March 28, 2024.   ACCESSION NUMBER(S): [id number]   ORDERING CLINICIAN: JAIME SILVERMAN   TECHNIQUE: Axial T2, FLAIR, DWI, gradient echo T2 and sagittal and coronal T1 weighted images of brain were acquired.   FINDINGS: Multiple scattered small foci of abnormally restricted diffusion consistent with acute to subacute infarctions including left external capsule, right putamen and external capsule, left thalamus, left periatrial white matter, cortex and juxtacortical white matter of both cerebral hemispheres, right cerebral peduncle, paramedian right dotty, and cerebellar vermis .   No acute intracranial hemorrhage.     No extra-axial fluid collection. No intracranial mass. Major vascular flow voids intact.   Scattered supratentorial FLAIR hyperintense foci are present predominantly involving the deep and periventricular white matter. These in part likely represent older infarctions and probably coexistent mild chronic small-vessel ischemic change.   Mild paranasal sinus/ethmoid mucosal inflammatory changes. No significant nonspecific mastoid fluid. Orbital contents unremarkable. No destructive osseous lesions identified.   Fusiform thickening of the ventral epidural space of the upper cervical spine particularly at the C1 and C2 levels causes at least moderate central canal stenosis at those levels. This likely represents degenerative pannus formation.       1. Multiple scattered small foci of abnormally restricted diffusion consistent with acute to subacute infarctions including left external capsule, right putamen and external capsule, left thalamus, left periatrial white matter, cortex and juxtacortical white matter of both cerebral hemispheres, right cerebral peduncle, paramedian right dotty, and cerebellar vermis. Findings suggest an extracranial embolic source, possibly aorta or heart. 2. Scattered supratentorial FLAIR hyperintense foci are present predominantly involving the deep and  periventricular white matter. These in part likely represent older infarctions and probably coexistent mild chronic small-vessel ischemic change. A primary demyelinating disease processes considered less likely.   MACRO: None   Signed by: Jovany Brennan 12/31/2024 1:52 PM Dictation workstation:   [id number]    Transthoracic Echo (TTE) Limited    Result Date: 12/30/2024   [facility], [address] [place] [address]              Tel [phone] and Fax [phone] TRANSTHORACIC ECHOCARDIOGRAM REPORT  Patient Name:       BRITTA FAN   Reading Physician:    [id number] Stephen Pulido MD Study Date:         12/30/2024          Ordering Provider:    [id number] JAIME SILVERMAN MRN/PID:            [id number]            Fellow: Accession#:         [id number]        Nurse: Date of Birth/Age:  1945 / 79     Sonographer:          Hermila Mac RDCS                     years Gender assigned at  M                   Additional Staff: Birth: Height:             177.80 cm           Admit Date:           12/30/2024 Weight:             103.87 kg           Admission Status:     Inpatient -                                                               Priority                                                               discharge BSA / BMI:          2.21 m2 / 32.86     Encounter#:           [id number]                     kg/m2 Blood Pressure:     137/65 mmHg         Department Location:  [facility]                                                               Invasive Study Type:    TRANSTHORACIC ECHO (TTE) LIMITED Diagnosis/ICD: Cerebrovascular disease, unspecified-I67.9 Indication:    Transischemic Attack CPT Code:      Echo Limited-04108 Patient History: Diabetes:          Yes Pertinent History: A-Fib, CAD, CVA, PE, HTN and Hyperlipidemia. S/P AVR 25mm                    freestyle  stentless AV bioprosthesis. Study Detail: The following Echo studies were performed: 2D, M-Mode, Doppler and               color flow. Agitated saline used as a contrast agent for               intraseptal flow evaluation.  PHYSICIAN INTERPRETATION: Left Ventricle: Left ventricular ejection fraction is normal, by visual estimate at 70%. There are no regional left ventricular wall motion abnormalities. The left ventricular cavity size is normal. There is normal septal and mildly increased posterior left ventricular wall thickness. There is left ventricular concentric remodeling. Left ventricular diastolic filling was not assessed. Left Atrium: The left atrium was not assessed. A bubble study using agitated saline was performed. Bubble study is negative. Right Ventricle: The right ventricle was not assessed. Right ventricular systolic function not assessed. Right Atrium: The right atrium was not assessed. Aortic Valve: There is a prosthetic aortic valve present. There is Freestyle bioprosthetic type aortic valve bioprosthesis with a 25 mm reported size. There is no evidence of aortic valve regurgitation. Mitral Valve: The mitral valve is normal in structure. There is severe mitral annular calcification. There is mild mitral valve regurgitation. Tricuspid Valve: The tricuspid valve is structurally normal. There is mild tricuspid regurgitation. The Doppler estimated RVSP is slightly elevated at 30.4 mmHg. Pulmonic Valve: The pulmonic valve is not well visualized. There is no indication of pulmonic valve regurgitation. Pericardium: There is no pericardial effusion noted. Aorta: The aortic root was not assessed. In comparison to the previous echocardiogram(s): Compared with study dated 9/3/2024, no significant change.  CONCLUSIONS:  1. Left ventricular ejection fraction is normal, by visual estimate at 70%.  2. There is severe mitral annular calcification.  3. Slightly elevated right ventricular systolic pressure.  4.  There is Freestyle bioprosthetic type aortic valve bioprosthesis with a 25 mm reported size.  5. A bubble study using agitated saline was performed. Bubble study is negative. QUANTITATIVE DATA SUMMARY:  2D MEASUREMENTS:          Normal Ranges: IVSd:            1.01 cm  (0.6-1.1cm) LVPWd:           1.17 cm  (0.6-1.1cm) LVIDd:           3.81 cm  (3.9-5.9cm) LVIDs:           2.69 cm LV Mass Index:   60 g/m2 LVEDV Index:     23 ml/m2 LV % FS          29.3 %  LV SYSTOLIC FUNCTION BY 2D PLANIMETRY (MOD):                      Normal Ranges: EF-A4C View:    67 % (>=55%) EF-A2C View:    62 % EF-Biplane:     66 % EF-Visual:      70 % LV EF Reported: 70 %  TRICUSPID VALVE/RVSP:          Normal Ranges: Peak TR Velocity:     2.62 m/s RV Syst Pressure:     30 mmHg  (< 30mmHg)  [id number] Stephen Pulido MD Electronically signed on 12/30/2024 at 6:19:54 PM  ** Final **     XR chest 2 views    Result Date: 12/30/2024  STUDY: Chest Radiographs;  [12-; 11:40 am] INDICATION: Weakness. COMPARISON: XR chest 3- ACCESSION NUMBER(S): [id number] ORDERING CLINICIAN: ANGELICA HO TECHNIQUE:  Frontal and lateral chest. FINDINGS: CARDIOMEDIASTINAL SILHOUETTE: Cardiomediastinal silhouette is normal in size and configuration.  LUNGS: Lungs are clear.  ABDOMEN: No remarkable upper abdominal findings.  BONES: No acute osseous changes.  Mediastinum wires are visualized.    No acute cardiopulmonary disease.. Signed by Jagdish Rose MD    CT head wo IV contrast    Result Date: 12/30/2024  Interpreted By:  Hai Mariscal, STUDY: CT HEAD WO IV CONTRAST;  12/30/2024 11:29 am   INDICATION: Signs/Symptoms:Headache with vertigo negative MRI of the head and MRA of the head and neck from March of this year.     COMPARISON: 03/18/2024   ACCESSION NUMBER(S): [id number]   ORDERING CLINICIAN: ANGELICA HO   TECHNIQUE: Noncontrast axial CT images of head were obtained with coronal and sagittal reconstructed images.   FINDINGS: BRAIN PARENCHYMA:  "Redemonstration of a chronic subcentimeter linear lacunar infarct in the left cerebellar hemisphere. Redemonstration of a tiny chronic lacunar infarct in the right centrum semi ovale, left putamen, and head of the right caudate nucleus. No acute intraparenchymal hemorrhage or parenchymal evidence of acute large territory ischemic infarct. Gray-white matter distinction is preserved. No mass-effect.   VENTRICLES and EXTRA-AXIAL SPACES:  No acute extra-axial or intraventricular hemorrhage. No effacement of cerebral sulci. The ventricles and sulci are age-concordant.   PARANASAL SINUSES/MASTOIDS:  No hemorrhage or air-fluid levels within the visualized paranasal sinuses. The mastoids are well aerated.   CALVARIUM/ORBITS:  No skull fracture.  The orbits and globes are intact to the extent visualized.   EXTRACRANIAL SOFT TISSUES: No discernible abnormality.       No acute intracranial abnormality.   Redemonstration of scattered chronic lacunar infarcts in the bilateral basal ganglia, right centrum semi ovale and left cerebellar hemisphere.   MACRO: None.   Signed by: Hai Mariscal 12/30/2024 12:08 PM Dictation workstation:   [id number]    ECG 12 lead    Result Date: 12/30/2024  Sinus rhythm with occasional Premature ventricular complexes Otherwise normal ECG When compared with ECG of 04-DEC-2024 12:21, Premature ventricular complexes are now Present Vent. rate has decreased BY  31 BPM    ECG 12 lead (Clinic Performed)    Result Date: 12/5/2024  Normal sinus rhythm Prolonged QT Abnormal ECG Confirmed by Freddie Giraldo ([id number]) on 12/5/2024 1:08:49 PM     Assessment & Plan    Cristy Forte is a 79 y.o. male with past medical history significant for hyperlipidemia, HTN, BPH, GERD, CVA, Type two diabetes on Metformin (A1c of 6.0 in October), CAD S/P cardiac stents and AVR (2018) on Xarelto 20 mg daily who presented to the ED today with dizziness, headache on top of his head as well as \"off balance\" for the past " few days although he has had similar symptoms on and off for a while. He also had a loss or right peripheral vision x 15 minutes on Friday night while he was watching TV but resolved on its own in about 15 min. He has had peripheral vision loss in the past but in the left eye.   He had a negative stress test in 2018.  Had an MRI of the brain in 3/24 with no acute findings.   His work up in the ED showed NO acute findings with a negative CT of the head for any acute findings. His labs were significant for magnesium of 1.59 and troponin of 256->236  He was given  mg as well as Tylenol 975 mg.     CVA  Headache, resolved  Balance and visual disturbances, chronic  History of CVA  - MRI brain: Multiple scattered small foci of abnormally restricted diffusion consistent with acute to subacute infarctions including left external capsule, right putamen and external capsule, left thalamus, left periatrial white matter, cortex and juxtacortical white matter of both cerebral hemispheres, right cerebral peduncle, paramedian right  dotty, and cerebellar vermis. Findings suggest an extracranial embolic source, possibly aorta or heart.  - Neurology consulted, Dr. Antonio informed of patient and will see later today. Recommended MRA head and neck, obtain A1C and lipid panel, and zio patch at discharge  - Talked to Dr. Menezes, stroke neurology via transfer center. Recommended continue Xarelto, stated no need to change anticoagulation. Obtain MRA head and neck, zio patch at discharge, and neurology follow up outpatient.     HTN  - Continue home losartan and chlorthalidone    Hyperlipidemia  - Continue lipitor    BPH  - Continue finasteride    GERD  - Continue protonix    Type 2 diabetes  - A1c of 6.0 in October  - Repeat A1c ordered  - continue SSI    DVT/GI prophylaxis  - Continue xarelto  - Continue pericolace     Discharge planning  - Discharge pending MRA head and neck and neurology consult. Plan to discharge home when  medically stable.    Interdisciplinary team rounding completed with hospitalist, nurse, TCC  Discussed plan and lab/testing results with Dr. Dwayne Plasencia PA-C  12/31/24  4:28 PM

## 2025-01-01 VITALS
DIASTOLIC BLOOD PRESSURE: 85 MMHG | HEART RATE: 54 BPM | RESPIRATION RATE: 18 BRPM | WEIGHT: 240.74 LBS | OXYGEN SATURATION: 97 % | HEIGHT: 71 IN | BODY MASS INDEX: 33.7 KG/M2 | TEMPERATURE: 97.2 F | SYSTOLIC BLOOD PRESSURE: 125 MMHG

## 2025-01-01 LAB
CHOLEST SERPL-MCNC: 84 MG/DL (ref 0–199)
CHOLESTEROL/HDL RATIO: 2.5
GLUCOSE BLD MANUAL STRIP-MCNC: 95 MG/DL (ref 74–99)
HDLC SERPL-MCNC: 33.8 MG/DL
HOLD SPECIMEN: NORMAL
LDLC SERPL CALC-MCNC: 35 MG/DL
NON HDL CHOLESTEROL: 50 MG/DL (ref 0–149)
TRIGL SERPL-MCNC: 75 MG/DL (ref 0–149)
VLDL: 15 MG/DL (ref 0–40)

## 2025-01-01 PROCEDURE — 80061 LIPID PANEL: CPT

## 2025-01-01 PROCEDURE — 82947 ASSAY GLUCOSE BLOOD QUANT: CPT

## 2025-01-01 PROCEDURE — 2500000002 HC RX 250 W HCPCS SELF ADMINISTERED DRUGS (ALT 637 FOR MEDICARE OP, ALT 636 FOR OP/ED): Performed by: NURSE PRACTITIONER

## 2025-01-01 PROCEDURE — 2500000001 HC RX 250 WO HCPCS SELF ADMINISTERED DRUGS (ALT 637 FOR MEDICARE OP): Performed by: NURSE PRACTITIONER

## 2025-01-01 PROCEDURE — 36415 COLL VENOUS BLD VENIPUNCTURE: CPT

## 2025-01-01 PROCEDURE — 99239 HOSP IP/OBS DSCHRG MGMT >30: CPT | Performed by: INTERNAL MEDICINE

## 2025-01-01 PROCEDURE — G0378 HOSPITAL OBSERVATION PER HR: HCPCS

## 2025-01-01 RX ADMIN — PANTOPRAZOLE SODIUM 40 MG: 40 TABLET, DELAYED RELEASE ORAL at 06:33

## 2025-01-01 RX ADMIN — CHLORTHALIDONE 25 MG: 25 TABLET ORAL at 09:32

## 2025-01-01 RX ADMIN — POTASSIUM CHLORIDE 20 MEQ: 1500 TABLET, EXTENDED RELEASE ORAL at 09:33

## 2025-01-01 RX ADMIN — ACETAMINOPHEN 650 MG: 325 TABLET, FILM COATED ORAL at 06:33

## 2025-01-01 RX ADMIN — LOSARTAN POTASSIUM 100 MG: 50 TABLET, FILM COATED ORAL at 09:32

## 2025-01-01 RX ADMIN — ASPIRIN 81 MG: 81 TABLET, COATED ORAL at 09:32

## 2025-01-01 RX ADMIN — FINASTERIDE 5 MG: 5 TABLET, FILM COATED ORAL at 09:33

## 2025-01-01 ASSESSMENT — PAIN - FUNCTIONAL ASSESSMENT: PAIN_FUNCTIONAL_ASSESSMENT: 0-10

## 2025-01-01 ASSESSMENT — COGNITIVE AND FUNCTIONAL STATUS - GENERAL
DAILY ACTIVITIY SCORE: 24
MOBILITY SCORE: 24

## 2025-01-01 ASSESSMENT — PAIN DESCRIPTION - LOCATION: LOCATION: BACK

## 2025-01-01 ASSESSMENT — PAIN SCALES - GENERAL: PAINLEVEL_OUTOF10: 0 - NO PAIN

## 2025-01-01 NOTE — DISCHARGE SUMMARY
"Admitting Provider: Astrid Araya MD  Discharge Provider: Reginaldo Rice MD  Primary Care Physician at Discharge: Misa Muir [phone]   Admission Date: 12/30/2024     Discharge Date: 1/1/2025  Current Planned Discharge Disposition: home    Discharge Diagnoses  Assessment & Plan  Acute CVA (cerebrovascular accident) (Multi)      Hospital Course  79 yoM with acute on chronic unsteadiness. He has hx of stroke.    MRI brain read: \"Multiple scattered small foci of abnormally restricted diffusion consistent with acute to subacute infarctions including left external capsule, right putamen and external capsule, left thalamus, left periatrial white matter, cortex and juxtacortical white matter of  both cerebral hemispheres, right cerebral peduncle, paramedian right dotty, and cerebellar vermis. Findings suggest an extracranial embolic source.\" He denies missing any doses of his xarelto or aspirin. Work-up, including echo and MRAs, was unrevealing for exact cause of stroke. Discussed with neuro, who did not recommend changing his xarelto or aspirin. He will do a Holter monitor. He is feeling better. Discharged home in stable condition.     Test Results Pending At Discharge  Pending Labs       Order Current Status    Extra Urine Gray Tube Collected (12/30/24 1036)    Urinalysis with Reflex Culture and Microscopic In process            Pertinent Physical Exam At Time of Discharge  /85 (BP Location: Left arm, Patient Position: Sitting)   Pulse 54   Temp 36.2 °C (97.2 °F) (Temporal)   Resp 18   Ht 1.791 m (5' 10.5\")   Wt 109 kg (240 lb 11.9 oz)   SpO2 97%   BMI 34.05 kg/m²   Physical Exam  Cardiovascular:      Rate and Rhythm: Normal rate and regular rhythm.      Heart sounds: Normal heart sounds.   Pulmonary:      Breath sounds: Normal breath sounds.   Abdominal:      General: Bowel sounds are normal.      Palpations: Abdomen is soft.   Musculoskeletal:         General: Normal range of " motion.   Neurological:      General: No focal deficit present.      Mental Status: He is alert and oriented to person, place, and time.   Psychiatric:         Mood and Affect: Mood normal.         Home Medications     Medication List      CHANGE how you take these medications     pantoprazole 40 mg EC tablet; Commonly known as: ProtoNix; TAKE 1 TABLET   EVERY MORNING BEFORE A MEAL. DO NOT CRUSH, CHEW, OR SPLIT.; What changed:   See the new instructions.     CONTINUE taking these medications     aspirin 81 mg EC tablet   atorvastatin 80 mg tablet; Commonly known as: Lipitor; TAKE 1 TABLET   EVERY DAY   blood sugar diagnostic strip; 1 strip 2 times a day as needed (Use to   test blood glucose 1-2 times daily).   blood-glucose meter misc; Use twice per day PRN   chlorthalidone 25 mg tablet; Commonly known as: [place]; TAKE 1 TABLET   EVERY DAY   ezetimibe 10 mg tablet; Commonly known as: Zetia; TAKE 1 TABLET EVERY   DAY   finasteride 5 mg tablet; Commonly known as: Proscar; TAKE 1 TABLET ONE   TIME DAILY   lancets misc; 1 Lancet 2 times a day as needed (Use to check blood   glucose 1-2 times daily). Use twice daily PRN   losartan 100 mg tablet; Commonly known as: Cozaar; Take 1 tablet (100   mg) by mouth once daily.   metFORMIN  mg 24 hr tablet; Commonly known as: Glucophage-XR; Take   1 tablet (750 mg) by mouth once daily in the evening. Take with meals. Do   not crush, chew, or split.   potassium chloride CR 20 mEq ER tablet; Commonly known as: Klor-Con M20;   TAKE 1 TABLET EVERY DAY   rivaroxaban 20 mg tablet; Commonly known as: Xarelto; Take 1 tablet (20   mg) by mouth once daily in the evening. Take with meals.   spironolactone 25 mg tablet; Commonly known as: Aldactone; Take 1 tablet   (25 mg) by mouth once daily.       Outpatient Follow-Up  Future Appointments   Date Time Provider Department [place]   1/8/2025  9:15 AM Freddie Giraldo MD [id number] [facility]   1/17/2025  9:15 AM Misa Muir MD  [id number] [facility]   1/28/2025  9:45 AM Iqra Giraldo MD [id number] [facility]   3/4/2025  8:45 AM Freddie Giraldo MD [id number] [facility]       Reginaldo Rice MD    I spent more than 30 min coordinating this patient's discharge.

## 2025-01-01 NOTE — CARE PLAN
The clinical goals for the shift include be free from fall/injury    Problem: Skin  Goal: Prevent/manage excess moisture  Outcome: Progressing  Goal: Prevent/minimize sheer/friction injuries  Outcome: Progressing  Goal: Promote/optimize nutrition  Outcome: Progressing     Problem: Pain - Adult  Goal: Verbalizes/displays adequate comfort level or baseline comfort level  Outcome: Progressing     Problem: Safety - Adult  Goal: Free from fall injury  Outcome: Progressing     Problem: Discharge Planning  Goal: Discharge to home or other facility with appropriate resources  Outcome: Progressing     Problem: Chronic Conditions and Co-morbidities  Goal: Patient's chronic conditions and co-morbidity symptoms are monitored and maintained or improved  Outcome: Progressing

## 2025-01-02 ENCOUNTER — PATIENT OUTREACH (OUTPATIENT)
Dept: PRIMARY CARE | Facility: CLINIC | Age: 80
End: 2025-01-02
Payer: MEDICARE

## 2025-01-02 NOTE — PROGRESS NOTES
Discharge Facility:[facility]   Discharge Diagnosis:Acute CVA  Admission Date:12/31/24  Discharge Date: 1/1/25    PCP Appointment Date:1/16/25  Specialist Appointment Date: Neurology  Hospital Encounter and Summary Linked: Yes  See discharge assessment below for further details  Engagement  Call Start Time: 1242 (1/2/2025 12:42 PM)    Medications  Medications reviewed with patient/caregiver?: Yes (1/2/2025 12:42 PM)  Is the patient having any side effects they believe may be caused by any medication additions or changes?: No (1/2/2025 12:42 PM)  Does the patient have all medications ordered at discharge?: Yes (1/2/2025 12:42 PM)  Is the patient taking all medications as directed (includes completed medication regime)?: Yes (1/2/2025 12:42 PM)    Appointments  Does the patient have a primary care provider?: Yes (1/2/2025 12:42 PM)  Care Management Interventions: Verified appointment date/time/provider (1/16/25) (1/2/2025 12:42 PM)  Has the patient kept scheduled appointments due by today?: Yes (1/2/2025 12:42 PM)  Care Management Interventions: Advised to schedule with specialist (Neurology) (1/2/2025 12:42 PM)    Patient Teaching  Does the patient have access to their discharge instructions?: Yes (1/2/2025 12:42 PM)  Care Management Interventions: Reviewed instructions with patient (1/2/2025 12:42 PM)  What is the patient's perception of their health status since discharge?: Improving (1/2/2025 12:42 PM)  Is the patient/caregiver able to teach back the hierarchy of who to call/visit for symptoms/problems? PCP, Specialist, Home Health nurse, Urgent Care, ED, 911: Yes (1/2/2025 12:42 PM)    Wrap Up  Wrap Up Additional Comments: This CM spoke with pt via phone. Pt reports doing well at home since discharge. New meds reviewed. Pt denies CP and SOB. Patient denies any further discharge questions/needs at this time. Emphasized that Follow up is needed after discharge to review the hospital recommendations, assess your response  to your treatment. Pt aware of my availability for non-emergent concerns. Contact info provided to patient. (1/2/2025 12:42 PM)      Astrid Aguilar LPN

## 2025-01-05 LAB
ATRIAL RATE: 62 BPM
P AXIS: 41 DEGREES
P OFFSET: 190 MS
P ONSET: 143 MS
PR INTERVAL: 164 MS
Q ONSET: 225 MS
QRS COUNT: 10 BEATS
QRS DURATION: 74 MS
QT INTERVAL: 426 MS
QTC CALCULATION(BAZETT): 432 MS
QTC FREDERICIA: 430 MS
R AXIS: -9 DEGREES
T AXIS: 64 DEGREES
T OFFSET: 438 MS
VENTRICULAR RATE: 62 BPM

## 2025-01-08 ENCOUNTER — OFFICE VISIT (OUTPATIENT)
Dept: CARDIOLOGY | Facility: HOSPITAL | Age: 80
End: 2025-01-08
Payer: MEDICARE

## 2025-01-08 VITALS
HEIGHT: 71 IN | BODY MASS INDEX: 32.34 KG/M2 | HEART RATE: 68 BPM | SYSTOLIC BLOOD PRESSURE: 100 MMHG | DIASTOLIC BLOOD PRESSURE: 65 MMHG | WEIGHT: 231 LBS

## 2025-01-08 DIAGNOSIS — E11.21 CONTROLLED TYPE 2 DIABETES MELLITUS WITH DIABETIC NEPHROPATHY, WITHOUT LONG-TERM CURRENT USE OF INSULIN: ICD-10-CM

## 2025-01-08 DIAGNOSIS — I10 PRIMARY HYPERTENSION: ICD-10-CM

## 2025-01-08 PROCEDURE — 1036F TOBACCO NON-USER: CPT | Performed by: INTERNAL MEDICINE

## 2025-01-08 PROCEDURE — 3074F SYST BP LT 130 MM HG: CPT | Performed by: INTERNAL MEDICINE

## 2025-01-08 PROCEDURE — 99214 OFFICE O/P EST MOD 30 MIN: CPT | Performed by: INTERNAL MEDICINE

## 2025-01-08 PROCEDURE — 3078F DIAST BP <80 MM HG: CPT | Performed by: INTERNAL MEDICINE

## 2025-01-08 PROCEDURE — 1160F RVW MEDS BY RX/DR IN RCRD: CPT | Performed by: INTERNAL MEDICINE

## 2025-01-08 PROCEDURE — 1159F MED LIST DOCD IN RCRD: CPT | Performed by: INTERNAL MEDICINE

## 2025-01-08 RX ORDER — LANCETS
1 EACH MISCELLANEOUS 2 TIMES DAILY PRN
Qty: 50 EACH | Refills: 3 | Status: SHIPPED | OUTPATIENT
Start: 2025-01-08 | End: 2026-01-08

## 2025-01-08 RX ORDER — CHLORTHALIDONE 25 MG/1
12.5 TABLET ORAL DAILY
Qty: 45 TABLET | Refills: 3 | Status: SHIPPED | OUTPATIENT
Start: 2025-01-08 | End: 2026-01-08

## 2025-01-08 RX ORDER — LOSARTAN POTASSIUM 50 MG/1
50 TABLET ORAL DAILY
Qty: 90 TABLET | Refills: 3 | Status: SHIPPED | OUTPATIENT
Start: 2025-01-08 | End: 2026-01-08

## 2025-01-08 RX ORDER — DEXTROSE 4 G
TABLET,CHEWABLE ORAL
Qty: 1 EACH | Refills: 0 | Status: SHIPPED | OUTPATIENT
Start: 2025-01-08

## 2025-01-08 NOTE — PROGRESS NOTES
Subjective:  Patient returns for a hospital follow-up.  He was briefly hospitalized overnight for some headaches as well as some dizziness/balance issues.  He did have a minor stable troponin elevation.    He fortunately has not had any more headaches but still still feels a bit off balance.  He denies any chest discomfort or dyspnea at a fair activity level.  He is taking all of his medications compliantly and is tolerating them well.    He denies any other new complaints at this time.    Objective:  General: Alert, usual delightful self.  HEENT: Unchanged.  Lungs: Clear without crackles.  Cardiac: Unchanged soft systolic murmur.  Abdomen: Nontender.  Extremities: No edema.  Skin: No acute rash.  Neuro: Grossly unchanged.    Lipid panel: Cholesterol-84, HDL-34, LDL-35, TG-75.    Impression/plan:  Cristy presents with some atypical headaches and dizziness/balance issues.  The etiology remains rather unclear to me.  I was pleased to see that his most recent echo looked excellent with a normally functioning prosthetic aortic valve.    I am a bit concerned whether his dizziness might represent presyncope due to some low blood pressure.  I will decrease his chlorthalidone to 12-1/2 a day and will also  decrease his losartan to 50 mg daily.  I will see him back in 1 month to reassess his symptoms and sort out whether we need to adjust his antihypertensive regimen further at that time.    I did not think we needed to embark on any repeat ischemic workup despite the marginally elevated troponin levels.    His lipid panel looks excellent on combination therapy.    He knows to call for any intercurrent concerns before his next visit.    Patient instructions:    Decrease chlorthalidone to 12-1/2 daily.    Decrease your losartan to 50 mg daily.    Continue other medications unchanged.    Return to clinic in 1 month.

## 2025-01-14 DIAGNOSIS — I63.9 ACUTE CVA (CEREBROVASCULAR ACCIDENT) (MULTI): Primary | ICD-10-CM

## 2025-01-14 DIAGNOSIS — E11.21 CONTROLLED TYPE 2 DIABETES MELLITUS WITH DIABETIC NEPHROPATHY, WITHOUT LONG-TERM CURRENT USE OF INSULIN: ICD-10-CM

## 2025-01-15 ENCOUNTER — PATIENT OUTREACH (OUTPATIENT)
Dept: PRIMARY CARE | Facility: CLINIC | Age: 80
End: 2025-01-15
Payer: MEDICARE

## 2025-01-15 NOTE — PROGRESS NOTES
Call regarding appt. with PCP on  1/17/25 after hospitalization.  At time of outreach call the patient feels as if their condition has improved since last visit.  No questions or concerns at this time.

## 2025-01-16 ENCOUNTER — APPOINTMENT (OUTPATIENT)
Dept: PRIMARY CARE | Facility: CLINIC | Age: 80
End: 2025-01-16
Payer: MEDICARE

## 2025-01-17 ENCOUNTER — APPOINTMENT (OUTPATIENT)
Dept: PRIMARY CARE | Facility: CLINIC | Age: 80
End: 2025-01-17
Payer: COMMERCIAL

## 2025-01-17 ENCOUNTER — HOSPITAL ENCOUNTER (OUTPATIENT)
Dept: RADIOLOGY | Facility: CLINIC | Age: 80
Discharge: HOME | End: 2025-01-17
Payer: MEDICARE

## 2025-01-17 VITALS
HEART RATE: 83 BPM | BODY MASS INDEX: 32.39 KG/M2 | TEMPERATURE: 97.3 F | SYSTOLIC BLOOD PRESSURE: 124 MMHG | WEIGHT: 229 LBS | DIASTOLIC BLOOD PRESSURE: 78 MMHG

## 2025-01-17 DIAGNOSIS — I63.9 ACUTE CVA (CEREBROVASCULAR ACCIDENT) (MULTI): Primary | ICD-10-CM

## 2025-01-17 DIAGNOSIS — Z09 HOSPITAL DISCHARGE FOLLOW-UP: ICD-10-CM

## 2025-01-17 DIAGNOSIS — E11.21 CONTROLLED TYPE 2 DIABETES MELLITUS WITH DIABETIC NEPHROPATHY, WITHOUT LONG-TERM CURRENT USE OF INSULIN: ICD-10-CM

## 2025-01-17 DIAGNOSIS — I63.9 ACUTE CVA (CEREBROVASCULAR ACCIDENT) (MULTI): ICD-10-CM

## 2025-01-17 PROCEDURE — 1126F AMNT PAIN NOTED NONE PRSNT: CPT | Performed by: FAMILY MEDICINE

## 2025-01-17 PROCEDURE — 3078F DIAST BP <80 MM HG: CPT | Performed by: FAMILY MEDICINE

## 2025-01-17 PROCEDURE — 1159F MED LIST DOCD IN RCRD: CPT | Performed by: FAMILY MEDICINE

## 2025-01-17 PROCEDURE — 99495 TRANSJ CARE MGMT MOD F2F 14D: CPT | Performed by: FAMILY MEDICINE

## 2025-01-17 PROCEDURE — 3074F SYST BP LT 130 MM HG: CPT | Performed by: FAMILY MEDICINE

## 2025-01-17 PROCEDURE — 1124F ACP DISCUSS-NO DSCNMKR DOCD: CPT | Performed by: FAMILY MEDICINE

## 2025-01-17 RX ORDER — PREDNISONE 50 MG/1
TABLET ORAL
Qty: 3 TABLET | Refills: 0 | Status: SHIPPED | OUTPATIENT
Start: 2025-01-17

## 2025-01-17 ASSESSMENT — PAIN SCALES - GENERAL: PAINLEVEL_OUTOF10: 0-NO PAIN

## 2025-01-17 NOTE — PROGRESS NOTES
"Subjective   Patient ID: Britta Fan is a 79 y.o. male who presents for Follow-up.  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  He was diagnosed with:    - (MRI brain 12/30/2024) multiple scattered small foci of abnormally restricted diffusion consistent with acute to subacute infarctions including left external capsule, right putamen and external capsule, left thalamus, left periatrial white matter, cortex and juxtacortical white matter of both cerebral hemispheres, right cerebral peduncle, paramedian right dotty, and cerebellar vermis.  Given the distribution of diffusion restriction, etiology is likely embolic.    -obtain further stroke work-up was obtained.    MRA H/N  TTE with bubble study  EKG  Telemetry monitoring  HbA1C, lipid panel  Continue with Xarelto, and ASA   Continue atorvastatin 80 mg and ezetimibe 10 mg  PT, OT  Consider Zio patch with discharge.  Recommended.    A review of system was completed.  All systems were reviewed and were normal, except for the ones that are listed in the HPI.    Objective   Physical Exam  Constitutional:       Appearance: Normal appearance.   HENT:      Head: Normocephalic and atraumatic.      Right Ear: Tympanic membrane, ear canal and external ear normal.      Left Ear: Tympanic membrane, ear canal and external ear normal.      Nose: Nose normal.      Mouth/Throat:      Mouth: Mucous membranes are moist.      Pharynx: Oropharynx is clear.   Eyes: "      Extraocular Movements: Extraocular movements intact.      Conjunctiva/sclera: Conjunctivae normal.      Pupils: Pupils are equal, round, and reactive to light.   Cardiovascular:      Rate and Rhythm: Normal rate and regular rhythm.      Pulses: Normal pulses.   Pulmonary:      Effort: Pulmonary effort is normal.      Breath sounds: Normal breath sounds.   Abdominal:      General: Abdomen is flat. Bowel sounds are normal.      Palpations: Abdomen is soft.   Musculoskeletal:         General: Normal range of motion.      Cervical back: Normal range of motion and neck supple.   Skin:     General: Skin is warm.   Neurological:      General: No focal deficit present.      Mental Status: He is alert and oriented to person, place, and time. Mental status is at baseline.   Psychiatric:         Mood and Affect: Mood normal.         Behavior: Behavior normal.         Thought Content: Thought content normal.         Judgment: Judgment normal.   Assessment/Plan   Problem List Items Addressed This Visit       Acute CVA (cerebrovascular accident) (Multi) - Primary     - (MRI brain 12/30/2024) multiple scattered small foci of abnormally restricted diffusion consistent with acute to subacute infarctions including left external capsule, right putamen and external capsule, left thalamus, left periatrial white matter, cortex and juxtacortical white matter of both cerebral hemispheres, right cerebral peduncle, paramedian right dotty, and cerebellar vermis.  Given the distribution of diffusion restriction, etiology is likely embolic.    -obtain further stroke work-up was obtained.    MRA H/N  TTE with bubble study  EKG  Telemetry monitoring  HbA1C, lipid panel  Continue with Xarelto, and ASA   Continue atorvastatin 80 mg and ezetimibe 10 mg  PT, OT  Consider Zio patch with discharge.  Recommended.  Referral to neurologist done  Follow-up with cardiologist  CT angio of the chest w/o contrast ordered.          Relevant Orders     Referral to Neurology    CT angio chest w and wo IV contrast    Hospital discharge follow-up     - (MRI brain 12/30/2024) multiple scattered small foci of abnormally restricted diffusion consistent with acute to subacute infarctions including left external capsule, right putamen and external capsule, left thalamus, left periatrial white matter, cortex and juxtacortical white matter of both cerebral hemispheres, right cerebral peduncle, paramedian right dotty, and cerebellar vermis.  Given the distribution of diffusion restriction, etiology is likely embolic.    -obtain further stroke work-up was obtained.    MRA H/N  TTE with bubble study  EKG  Telemetry monitoring  HbA1C, lipid panel  Continue with Xarelto, and ASA   Continue atorvastatin 80 mg and ezetimibe 10 mg  PT, OT  Consider Zio patch with discharge.  Recommended.  Referral to neurologist and follow-up with cardiologist as scheduled.   CT angio of the chest w/o contrast ordered.            Relevant Orders    CT angio chest w and wo IV contrast    Patient to return to office in 6- 8 weeks.

## 2025-01-17 NOTE — ASSESSMENT & PLAN NOTE
- (MRI brain 12/30/2024) multiple scattered small foci of abnormally restricted diffusion consistent with acute to subacute infarctions including left external capsule, right putamen and external capsule, left thalamus, left periatrial white matter, cortex and juxtacortical white matter of both cerebral hemispheres, right cerebral peduncle, paramedian right dotty, and cerebellar vermis.  Given the distribution of diffusion restriction, etiology is likely embolic.    -obtain further stroke work-up was obtained.    MRA H/N  TTE with bubble study  EKG  Telemetry monitoring  HbA1C, lipid panel  Continue with Xarelto, and ASA   Continue atorvastatin 80 mg and ezetimibe 10 mg  PT, OT  Consider Zio patch with discharge.  Recommended.  Referral to neurologist done  Follow-up with cardiologist  CT angio of the chest w/o contrast ordered.

## 2025-01-17 NOTE — PROGRESS NOTES
Prednisone and Benadryl were prescribed in order to prevent his an allergic reaction to the IV dye.  He should take    1.  Prednisone 50 mg, take 1 tablet 13 hours before the CT scan, then 7 hours before the CT scan then 1 hour before the CT scan.    2.  Benadryl 50 mg, take 1 tablet 1 hour before the procedure CT scan.

## 2025-01-17 NOTE — ASSESSMENT & PLAN NOTE
- (MRI brain 12/30/2024) multiple scattered small foci of abnormally restricted diffusion consistent with acute to subacute infarctions including left external capsule, right putamen and external capsule, left thalamus, left periatrial white matter, cortex and juxtacortical white matter of both cerebral hemispheres, right cerebral peduncle, paramedian right dotty, and cerebellar vermis.  Given the distribution of diffusion restriction, etiology is likely embolic.    -obtain further stroke work-up was obtained.    MRA H/N  TTE with bubble study  EKG  Telemetry monitoring  HbA1C, lipid panel  Continue with Xarelto, and ASA   Continue atorvastatin 80 mg and ezetimibe 10 mg  PT, OT  Consider Zio patch with discharge.  Recommended.  Referral to neurologist and follow-up with cardiologist as scheduled.   CT angio of the chest w/o contrast ordered.

## 2025-01-20 ENCOUNTER — TELEPHONE (OUTPATIENT)
Dept: PRIMARY CARE | Facility: CLINIC | Age: 80
End: 2025-01-20
Payer: MEDICARE

## 2025-01-28 ENCOUNTER — APPOINTMENT (OUTPATIENT)
Dept: OPHTHALMOLOGY | Facility: CLINIC | Age: 80
End: 2025-01-28
Payer: MEDICARE

## 2025-01-28 DIAGNOSIS — H52.4 PRESBYOPIA: ICD-10-CM

## 2025-01-28 DIAGNOSIS — H52.223 REGULAR ASTIGMATISM OF BOTH EYES: ICD-10-CM

## 2025-01-28 DIAGNOSIS — H52.03 HYPEROPIA OF BOTH EYES: ICD-10-CM

## 2025-01-28 DIAGNOSIS — H53.8 BLURRED VISION: ICD-10-CM

## 2025-01-28 DIAGNOSIS — H25.813 COMBINED FORMS OF AGE-RELATED CATARACT OF BOTH EYES: ICD-10-CM

## 2025-01-28 DIAGNOSIS — I69.30 H/O: STROKE WITH RESIDUAL EFFECTS: Primary | ICD-10-CM

## 2025-01-28 PROCEDURE — 92015 DETERMINE REFRACTIVE STATE: CPT | Performed by: OPHTHALMOLOGY

## 2025-01-28 PROCEDURE — 92014 COMPRE OPH EXAM EST PT 1/>: CPT | Performed by: OPHTHALMOLOGY

## 2025-01-28 ASSESSMENT — CONF VISUAL FIELD
OS_INFERIOR_NASAL_RESTRICTION: 0
OD_NORMAL: 1
OS_NORMAL: 1
OD_SUPERIOR_NASAL_RESTRICTION: 0
OS_SUPERIOR_NASAL_RESTRICTION: 0
OD_INFERIOR_TEMPORAL_RESTRICTION: 0
OS_SUPERIOR_TEMPORAL_RESTRICTION: 0
OS_INFERIOR_TEMPORAL_RESTRICTION: 0
OD_SUPERIOR_TEMPORAL_RESTRICTION: 0
OD_INFERIOR_NASAL_RESTRICTION: 0

## 2025-01-28 ASSESSMENT — REFRACTION_WEARINGRX
OD_AXIS: 100
OS_ADD: +2.50
OD_SPHERE: +1.50
OS_AXIS: 075
OD_CYLINDER: -2.00
OS_CYLINDER: -2.50
OS_SPHERE: +1.50
OD_ADD: +2.50

## 2025-01-28 ASSESSMENT — SLIT LAMP EXAM - LIDS
COMMENTS: GOOD POSITION
COMMENTS: GOOD POSITION

## 2025-01-28 ASSESSMENT — VISUAL ACUITY
OS_CC: J1+
METHOD: SNELLEN - LINEAR
OS_CC: 20/25
OD_CC: 20/20
OD_CC: J1+

## 2025-01-28 ASSESSMENT — CUP TO DISC RATIO
OD_RATIO: 0.2
OS_RATIO: 0.2

## 2025-01-28 ASSESSMENT — ENCOUNTER SYMPTOMS
PSYCHIATRIC NEGATIVE: 0
ALLERGIC/IMMUNOLOGIC NEGATIVE: 0
GASTROINTESTINAL NEGATIVE: 0
EYES NEGATIVE: 1
CARDIOVASCULAR NEGATIVE: 0
HEMATOLOGIC/LYMPHATIC NEGATIVE: 0
ENDOCRINE NEGATIVE: 0
RESPIRATORY NEGATIVE: 0
NEUROLOGICAL NEGATIVE: 0
CONSTITUTIONAL NEGATIVE: 0
MUSCULOSKELETAL NEGATIVE: 0

## 2025-01-28 ASSESSMENT — REFRACTION_MANIFEST
OS_CYLINDER: -2.00
OD_SPHERE: +1.50
OS_AXIS: 075
OS_SPHERE: +1.00
OD_CYLINDER: -2.00
OD_AXIS: 100
OD_ADD: +2.50
OS_ADD: +2.50

## 2025-01-28 ASSESSMENT — EXTERNAL EXAM - RIGHT EYE: OD_EXAM: NORMAL

## 2025-01-28 ASSESSMENT — TONOMETRY
OD_IOP_MMHG: 16
OS_IOP_MMHG: 16
IOP_METHOD: GOLDMANN APPLANATION

## 2025-01-28 ASSESSMENT — EXTERNAL EXAM - LEFT EYE: OS_EXAM: NORMAL

## 2025-01-28 NOTE — PROGRESS NOTES
Assessment/Plan   Diagnoses and all orders for this visit:  H/O: stroke with residual effects  -continue with neurology workup    Blurred vision  Improved with refraction    Combined forms of age-related cataract of both eyes  Not visually significant at the present time  continue to monitor    Hyperopia of both eyes  Regular astigmatism of both eyes  Presbyopia  Refractive error  -give Rx for new glasses    Return for a dilated exam in   12   months or sooner if having any problems

## 2025-01-30 ENCOUNTER — PATIENT OUTREACH (OUTPATIENT)
Dept: PRIMARY CARE | Facility: CLINIC | Age: 80
End: 2025-01-30
Payer: MEDICARE

## 2025-01-30 ENCOUNTER — TELEPHONE (OUTPATIENT)
Dept: PRIMARY CARE | Facility: CLINIC | Age: 80
End: 2025-01-30
Payer: MEDICARE

## 2025-01-30 DIAGNOSIS — E11.21 CONTROLLED TYPE 2 DIABETES MELLITUS WITH DIABETIC NEPHROPATHY, WITHOUT LONG-TERM CURRENT USE OF INSULIN: ICD-10-CM

## 2025-02-04 ENCOUNTER — HOSPITAL ENCOUNTER (OUTPATIENT)
Dept: RADIOLOGY | Facility: HOSPITAL | Age: 80
Discharge: HOME | End: 2025-02-04
Payer: MEDICARE

## 2025-02-04 DIAGNOSIS — Z09 HOSPITAL DISCHARGE FOLLOW-UP: ICD-10-CM

## 2025-02-04 DIAGNOSIS — I63.9 ACUTE CVA (CEREBROVASCULAR ACCIDENT) (MULTI): ICD-10-CM

## 2025-02-04 PROCEDURE — 71275 CT ANGIOGRAPHY CHEST: CPT

## 2025-02-04 PROCEDURE — 71275 CT ANGIOGRAPHY CHEST: CPT | Performed by: RADIOLOGY

## 2025-02-04 PROCEDURE — 2550000001 HC RX 255 CONTRASTS: Performed by: FAMILY MEDICINE

## 2025-02-04 RX ADMIN — IOHEXOL 75 ML: 350 INJECTION, SOLUTION INTRAVENOUS at 09:12

## 2025-02-07 ENCOUNTER — TELEPHONE (OUTPATIENT)
Dept: PRIMARY CARE | Facility: CLINIC | Age: 80
End: 2025-02-07
Payer: MEDICARE

## 2025-02-07 NOTE — RESULT ENCOUNTER NOTE
Patient's CT angiogram of the chest showed signs of normal blood vessels (especially the aorta which is the main blood vessels needing the heart).  There is calcium buildup in one of the valves in the heart.  Unchanged compared to the last echocardiogram done in December 2024.  No other findings.  This should have been already addressed by his heart doctor.

## 2025-02-11 ENCOUNTER — OFFICE VISIT (OUTPATIENT)
Dept: CARDIOLOGY | Facility: HOSPITAL | Age: 80
End: 2025-02-11
Payer: MEDICARE

## 2025-02-11 VITALS
HEIGHT: 70 IN | DIASTOLIC BLOOD PRESSURE: 70 MMHG | WEIGHT: 225 LBS | BODY MASS INDEX: 32.21 KG/M2 | HEART RATE: 87 BPM | SYSTOLIC BLOOD PRESSURE: 115 MMHG

## 2025-02-11 DIAGNOSIS — I48.91 ATRIAL FIBRILLATION, UNSPECIFIED TYPE (MULTI): Primary | ICD-10-CM

## 2025-02-11 LAB
ATRIAL RATE: 87 BPM
P AXIS: 66 DEGREES
P OFFSET: 191 MS
P ONSET: 135 MS
PR INTERVAL: 170 MS
Q ONSET: 220 MS
QRS COUNT: 15 BEATS
QRS DURATION: 82 MS
QT INTERVAL: 376 MS
QTC CALCULATION(BAZETT): 452 MS
QTC FREDERICIA: 425 MS
R AXIS: -14 DEGREES
T AXIS: 74 DEGREES
T OFFSET: 408 MS
VENTRICULAR RATE: 87 BPM

## 2025-02-11 PROCEDURE — 93005 ELECTROCARDIOGRAM TRACING: CPT | Performed by: INTERNAL MEDICINE

## 2025-02-11 PROCEDURE — 99213 OFFICE O/P EST LOW 20 MIN: CPT | Mod: 25 | Performed by: INTERNAL MEDICINE

## 2025-02-11 PROCEDURE — 99213 OFFICE O/P EST LOW 20 MIN: CPT | Performed by: INTERNAL MEDICINE

## 2025-02-11 PROCEDURE — 1036F TOBACCO NON-USER: CPT | Performed by: INTERNAL MEDICINE

## 2025-02-11 PROCEDURE — 1159F MED LIST DOCD IN RCRD: CPT | Performed by: INTERNAL MEDICINE

## 2025-02-11 PROCEDURE — 1160F RVW MEDS BY RX/DR IN RCRD: CPT | Performed by: INTERNAL MEDICINE

## 2025-02-11 PROCEDURE — 3074F SYST BP LT 130 MM HG: CPT | Performed by: INTERNAL MEDICINE

## 2025-02-11 PROCEDURE — 93010 ELECTROCARDIOGRAM REPORT: CPT | Performed by: INTERNAL MEDICINE

## 2025-02-11 PROCEDURE — 3078F DIAST BP <80 MM HG: CPT | Performed by: INTERNAL MEDICINE

## 2025-02-11 NOTE — PROGRESS NOTES
Subjective:  Patient returns for a 1 month follow-up.  When we last saw him, we reduced his losartan and chlorthalidone dosing due to some presyncopal spells.  I was pleased to see that these have improved significantly.  He is still having some balance issues and actually thought he might have had a recurrent stroke.  He will be seeing neurology again in the near future.  I told him I thought this was very unlikely given his aspirin and Xarelto therapy.    He denies any angina.  He is accompanied by his supportive daughter who overall feels he is doing reasonably well.    Objective:  General: Alert and unchanged.  HEENT: No change.  Lungs: Clear with good air exchange.  Cardiac: Normal S1 and S2 with unchanged soft systolic murmur.  Abdomen: Nontender.    EKG: Sinus rhythm with PACs.  Otherwise normal tracing.    Impression/plan:  Cristy is generally doing quite nicely at this time.  I was pleased to see that his presyncope has improved with decreasing his antihypertensive regimen.  He is not having any other new cardiac complaints at this time.  I elected to embark on no further testing at this time and will not make any further medication adjustments.  I will see her back for routine follow-up in 3 months, but he and his daughter know to call for any intercurrent concerns.    Patient instructions:    Continue current medications unchanged.    Return to clinic in 3 months.

## 2025-02-14 DIAGNOSIS — E78.00 PURE HYPERCHOLESTEROLEMIA: ICD-10-CM

## 2025-02-17 RX ORDER — EZETIMIBE 10 MG/1
10 TABLET ORAL DAILY
Qty: 90 TABLET | Refills: 3 | Status: SHIPPED | OUTPATIENT
Start: 2025-02-17

## 2025-02-19 ENCOUNTER — APPOINTMENT (OUTPATIENT)
Dept: CARDIOLOGY | Facility: HOSPITAL | Age: 80
End: 2025-02-19
Payer: MEDICARE

## 2025-03-02 ENCOUNTER — APPOINTMENT (OUTPATIENT)
Dept: RADIOLOGY | Facility: HOSPITAL | Age: 80
End: 2025-03-02
Payer: MEDICARE

## 2025-03-02 ENCOUNTER — HOSPITAL ENCOUNTER (EMERGENCY)
Facility: HOSPITAL | Age: 80
Discharge: HOME | End: 2025-03-02
Attending: EMERGENCY MEDICINE
Payer: MEDICARE

## 2025-03-02 VITALS
HEART RATE: 65 BPM | WEIGHT: 230 LBS | RESPIRATION RATE: 18 BRPM | HEIGHT: 70 IN | DIASTOLIC BLOOD PRESSURE: 77 MMHG | SYSTOLIC BLOOD PRESSURE: 130 MMHG | OXYGEN SATURATION: 99 % | BODY MASS INDEX: 32.93 KG/M2 | TEMPERATURE: 98.1 F

## 2025-03-02 DIAGNOSIS — R31.9 HEMATURIA, UNSPECIFIED TYPE: Primary | ICD-10-CM

## 2025-03-02 DIAGNOSIS — N30.90 CYSTITIS: ICD-10-CM

## 2025-03-02 DIAGNOSIS — Z79.01 ANTICOAGULATION ADEQUATE: ICD-10-CM

## 2025-03-02 DIAGNOSIS — R78.81 BACTEREMIA: ICD-10-CM

## 2025-03-02 LAB
ABO GROUP (TYPE) IN BLOOD: NORMAL
ALBUMIN SERPL BCP-MCNC: 4 G/DL (ref 3.4–5)
ALP SERPL-CCNC: 60 U/L (ref 33–136)
ALT SERPL W P-5'-P-CCNC: 21 U/L (ref 10–52)
ANION GAP SERPL CALC-SCNC: 11 MMOL/L (ref 10–20)
ANTIBODY SCREEN: NORMAL
APPEARANCE UR: ABNORMAL
APTT PPP: 44 SECONDS (ref 26–36)
AST SERPL W P-5'-P-CCNC: 24 U/L (ref 9–39)
BACTERIA #/AREA URNS AUTO: ABNORMAL /HPF
BASOPHILS # BLD AUTO: 0.02 X10*3/UL (ref 0–0.1)
BASOPHILS NFR BLD AUTO: 0.3 %
BILIRUB SERPL-MCNC: 0.6 MG/DL (ref 0–1.2)
BILIRUB UR STRIP.AUTO-MCNC: NEGATIVE MG/DL
BUN SERPL-MCNC: 13 MG/DL (ref 6–23)
CALCIUM SERPL-MCNC: 8.9 MG/DL (ref 8.6–10.3)
CHLORIDE SERPL-SCNC: 105 MMOL/L (ref 98–107)
CO2 SERPL-SCNC: 29 MMOL/L (ref 21–32)
COLOR UR: ABNORMAL
CREAT SERPL-MCNC: 1.03 MG/DL (ref 0.5–1.3)
EGFRCR SERPLBLD CKD-EPI 2021: 75 ML/MIN/1.73M*2
EOSINOPHIL # BLD AUTO: 0 X10*3/UL (ref 0–0.4)
EOSINOPHIL NFR BLD AUTO: 0 %
ERYTHROCYTE [DISTWIDTH] IN BLOOD BY AUTOMATED COUNT: 16.9 % (ref 11.5–14.5)
GLUCOSE SERPL-MCNC: 101 MG/DL (ref 74–99)
GLUCOSE UR STRIP.AUTO-MCNC: NORMAL MG/DL
HCT VFR BLD AUTO: 38.1 % (ref 41–52)
HGB BLD-MCNC: 11.6 G/DL (ref 13.5–17.5)
IMM GRANULOCYTES # BLD AUTO: 0.03 X10*3/UL (ref 0–0.5)
IMM GRANULOCYTES NFR BLD AUTO: 0.5 % (ref 0–0.9)
INR PPP: 2.7 (ref 0.9–1.1)
KETONES UR STRIP.AUTO-MCNC: NEGATIVE MG/DL
LACTATE SERPL-SCNC: 1.4 MMOL/L (ref 0.4–2)
LEUKOCYTE ESTERASE UR QL STRIP.AUTO: NEGATIVE
LIPASE SERPL-CCNC: 36 U/L (ref 9–82)
LYMPHOCYTES # BLD AUTO: 0.81 X10*3/UL (ref 0.8–3)
LYMPHOCYTES NFR BLD AUTO: 13 %
MCH RBC QN AUTO: 24.3 PG (ref 26–34)
MCHC RBC AUTO-ENTMCNC: 30.4 G/DL (ref 32–36)
MCV RBC AUTO: 80 FL (ref 80–100)
MONOCYTES # BLD AUTO: 0.47 X10*3/UL (ref 0.05–0.8)
MONOCYTES NFR BLD AUTO: 7.5 %
NEUTROPHILS # BLD AUTO: 4.91 X10*3/UL (ref 1.6–5.5)
NEUTROPHILS NFR BLD AUTO: 78.7 %
NITRITE UR QL STRIP.AUTO: NEGATIVE
NRBC BLD-RTO: 0 /100 WBCS (ref 0–0)
PH UR STRIP.AUTO: 7 [PH]
PLATELET # BLD AUTO: 163 X10*3/UL (ref 150–450)
POTASSIUM SERPL-SCNC: 4.2 MMOL/L (ref 3.5–5.3)
PROT SERPL-MCNC: 7.4 G/DL (ref 6.4–8.2)
PROT UR STRIP.AUTO-MCNC: ABNORMAL MG/DL
PROTHROMBIN TIME: 30.4 SECONDS (ref 9.8–12.4)
RBC # BLD AUTO: 4.78 X10*6/UL (ref 4.5–5.9)
RBC # UR STRIP.AUTO: ABNORMAL MG/DL
RBC #/AREA URNS AUTO: >20 /HPF
RH FACTOR (ANTIGEN D): NORMAL
SODIUM SERPL-SCNC: 141 MMOL/L (ref 136–145)
SP GR UR STRIP.AUTO: 1.01
SQUAMOUS #/AREA URNS AUTO: ABNORMAL /HPF
UROBILINOGEN UR STRIP.AUTO-MCNC: NORMAL MG/DL
WBC # BLD AUTO: 6.2 X10*3/UL (ref 4.4–11.3)
WBC #/AREA URNS AUTO: ABNORMAL /HPF

## 2025-03-02 PROCEDURE — 36415 COLL VENOUS BLD VENIPUNCTURE: CPT | Performed by: NURSE PRACTITIONER

## 2025-03-02 PROCEDURE — 99284 EMERGENCY DEPT VISIT MOD MDM: CPT | Mod: 25 | Performed by: EMERGENCY MEDICINE

## 2025-03-02 PROCEDURE — 81001 URINALYSIS AUTO W/SCOPE: CPT | Performed by: NURSE PRACTITIONER

## 2025-03-02 PROCEDURE — 80053 COMPREHEN METABOLIC PANEL: CPT | Performed by: NURSE PRACTITIONER

## 2025-03-02 PROCEDURE — 83690 ASSAY OF LIPASE: CPT | Performed by: NURSE PRACTITIONER

## 2025-03-02 PROCEDURE — 83605 ASSAY OF LACTIC ACID: CPT | Performed by: NURSE PRACTITIONER

## 2025-03-02 PROCEDURE — 85025 COMPLETE CBC W/AUTO DIFF WBC: CPT | Performed by: NURSE PRACTITIONER

## 2025-03-02 PROCEDURE — 87086 URINE CULTURE/COLONY COUNT: CPT | Mod: AHULAB | Performed by: NURSE PRACTITIONER

## 2025-03-02 PROCEDURE — 76857 US EXAM PELVIC LIMITED: CPT | Performed by: RADIOLOGY

## 2025-03-02 PROCEDURE — 76857 US EXAM PELVIC LIMITED: CPT

## 2025-03-02 PROCEDURE — 85610 PROTHROMBIN TIME: CPT | Performed by: NURSE PRACTITIONER

## 2025-03-02 PROCEDURE — 86901 BLOOD TYPING SEROLOGIC RH(D): CPT | Performed by: NURSE PRACTITIONER

## 2025-03-02 RX ORDER — CEPHALEXIN 500 MG/1
500 CAPSULE ORAL 2 TIMES DAILY
Qty: 20 CAPSULE | Refills: 0 | Status: SHIPPED | OUTPATIENT
Start: 2025-03-02 | End: 2025-03-12

## 2025-03-02 ASSESSMENT — PAIN - FUNCTIONAL ASSESSMENT: PAIN_FUNCTIONAL_ASSESSMENT: 0-10

## 2025-03-02 ASSESSMENT — PAIN SCALES - GENERAL: PAINLEVEL_OUTOF10: 0 - NO PAIN

## 2025-03-02 NOTE — ED PROVIDER NOTES
HPI   Chief Complaint   Patient presents with    Abdominal Pain    Blood in Urine       HPI:  79-year-old male arrives with hematuria that is painless he is urinating without difficulty.  On ultrasound was done through triage demonstrating a nondistended bladder.  He is not running a fever but he is on anticoagulation therapy Xarelto for his irregular heartbeat he does have a valve replacement but it is a porcine valve it is not mechanical.  The patient has no other complaints the blood he saw in his urine has improved significantly he has been asked to increase clear water intake help clear any further hematuria.  He is also aware that he is to return if the bleeding worsens or pain is noted in his bladder.  He also knows to return if he has a fever.  He will hold the Xarelto until he speaks with his physician in the morning as they we will decide when to restart I have asked that he not utilize this if he sees blood in his urine.  He has an INR of 2.7 and that is on a DOAC.  All other labs are at baseline.  All labs and imaging have been discussed all questions have been answered he will also follow-up with urology for future cystoscopy.    PMH: Reviewed valvular replacement problem list completed     negative for both tobacco Alcohol  FH negative for both heart disease Diabetes  ROS  General Appears in distress  HEENT: No sore throat, No Visual Loss, No Headache, No Ear Pain  Neck: Denies neck pain  Chest: No chest pain, no pleuritic pain, no chest wall injury  Pulmonary: No SOB, No Cough, No Sputum production, No Wheezing  GI: No abdominal pain, no nausea or vomiting, no diarrhea.  : No dysuria, no frequency, positive hematuria.  Extremities: No musculoskeletal pain, normal ambulation, no paresthesia.  Psych: Normal interaction, no anxiety, no depression, no suicidal ideation  Skin: No rashes    ROS is otherwise negative    PE: General: Appears in distress        HEENT: Throat is moist without exudate,  midline uvula dentate intact, Tms clear with normal anatomy.        Neck: Supple non tender        Chest CTA, good AE, no wheezing, rales, or rhonci        CVA: RRR S1S2 no S3S4 or murmur        ABD: W/S/NT no HSM, no pulsatile masses, good bowel sounds        Extremities: Excellent distal pulses, brisk capillary refill. Full ROM        Psych: Normal interactions with no signs of depression  or suicidal ideation.        Neuro: Alert and oriented, moves all and feels all.    MDM:79-year-old male arrives with hematuria that is painless he is urinating without difficulty.  On ultrasound was done through triage demonstrating a nondistended bladder.  He is not running a fever but he is on anticoagulation therapy Xarelto for his irregular heartbeat he does have a valve replacement but it is a porcine valve it is not mechanical.  The patient has no other complaints the blood he saw in his urine has improved significantly he has been asked to increase clear water intake help clear any further hematuria.  He is also aware that he is to return if the bleeding worsens or pain is noted in his bladder.  He also knows to return if he has a fever.  He will hold the Xarelto until he speaks with his physician in the morning as they we will decide when to restart I have asked that he not utilize this if he sees blood in his urine.  He has an INR of 2.7 and that is on a DOAC.  All other labs are at baseline.  All labs and imaging have been discussed all questions have been answered he will also follow-up with urology for future cystoscopy.                Patient History   Past Medical History:   Diagnosis Date    Cerebral infarction, unspecified 07/22/2020    Brainstem stroke    Encounter for screening for malignant neoplasm of rectum     Encounter for screening for malignant neoplasm of rectum    Impacted cerumen, bilateral 12/05/2016    Impacted cerumen of both ears    Inguinal hernia 02/07/2023    Nocturia     Nocturia    Other  conditions influencing health status     Screening for malignant neoplasms, colon    Other spondylosis with myelopathy, cervical region 02/07/2023    Personal history of other diseases of male genital organs 02/15/2019    History of chronic prostatitis    Personal history of other diseases of the circulatory system 04/08/2015    History of angina pectoris    Personal history of other diseases of the respiratory system 10/14/2015    History of influenza    Personal history of other drug therapy 12/11/2014    History of pneumococcal vaccination    Personal history of other specified conditions 02/06/2019    History of odynophagia    Personal history of other specified conditions     History of edema    Radiculopathy, cervical region     Cervical radiculopathy    Stenosis, cervical spine 02/07/2023    Unspecified hemorrhoids     Bleeding hemorrhoids     Past Surgical History:   Procedure Laterality Date    CERVICAL FUSION  10/03/2013    Cervical Vertebral Fusion    KNEE SURGERY  12/11/2014    Knee Surgery    MR HEAD ANGIO WO IV CONTRAST  6/21/2020    MR HEAD ANGIO WO IV CONTRAST 6/21/2020 [facility]    MR HEAD ANGIO WO IV CONTRAST  10/30/2022    MR HEAD ANGIO WO IV CONTRAST 10/30/2022 [facility]    MR NECK ANGIO WO IV CONTRAST  6/21/2020    MR NECK ANGIO WO IV CONTRAST 6/21/2020 [facility]    MR NECK ANGIO WO IV CONTRAST  10/30/2022    MR NECK ANGIO WO IV CONTRAST 10/30/2022 [facility]    OTHER SURGICAL HISTORY  01/09/2019    Tonsillectomy    OTHER SURGICAL HISTORY  01/09/2019    Adenoidectomy    OTHER SURGICAL HISTORY  02/18/2020    Aortic valve replacement    PILONIDAL CYST DRAINAGE  12/11/2014    Pilonidal Cyst Resection     Family History   Problem Relation Name Age of Onset    Hypertension Mother      Diabetes Father      Hypertension Father       Social History     Tobacco Use    Smoking status: Former     Current packs/day: 0.00     Types: Cigarettes     Quit date: 1990      Years since quittin.1     Passive exposure: Never    Smokeless tobacco: Never   Substance Use Topics    Alcohol use: Yes     Comment: OCASSIONALLY    Drug use: Never       Physical Exam   ED Triage Vitals   Temperature Heart Rate Respirations BP   25 1245 25 1245 25 1245 25 1245   35.9 °C (96.6 °F) 92 20 137/77      Pulse Ox Temp Source Heart Rate Source Patient Position   25 1245 25 1515 25 1515 --   99 % Oral Monitor       BP Location FiO2 (%)     25 1515 --     Right arm        Physical Exam      ED Course & MDM   Diagnoses as of 25 1723   Hematuria, unspecified type   Bacteremia   Anticoagulation adequate   Cystitis                 No data recorded                                 Medical Decision Making      Procedure  Procedures     Alcon Rawls MD  25 1724

## 2025-03-02 NOTE — ED TRIAGE NOTES
Pt presents with the c/o abd pain with associated nausea and vomiting. Pt states that he got up a few times because he felt sick to his stomach and the last time he noticed blood in his urine.

## 2025-03-02 NOTE — ED TRIAGE NOTES
TRIAGE NOTE   I saw the patient as the Clinician in Triage and performed a brief history and physical exam, established acuity, and ordered appropriate tests to develop basic plan of care. Patient will be seen by an [facility], resident and/or physician who will independently evaluate the patient. Please see subsequent provider notes for further details and disposition.     Brief HPI: In brief, Cristy Forte is a 79 y.o. male that presents for gross hematuria and he is on Xarelto for history of open heart surgery and stents.  He denies weakness.  He denies chest pain.  He endorses lower pain to his pelvic area.  He rates it minimal.  He denies melena or hematochezia.  He denies constipation or diarrhea.  He denies dysuria.  He denies chest pain or dyspnea.  All vital signs are normal and stable in triage..     Focused Physical exam:   I did not appreciate any erythema or sign of infection at scrotum or penis.  Slight tenderness to the bladder.  Clear bilateral lung sounds normal S1-S2 and rate.  Conjunctiva slightly pale  Plan/MDM:   Type and screen, CBC CMP lipase lactate ordered.  Urinalysis ordered.  Ultrasound of bladder ordered.  Please see subsequent provider note for further details and disposition

## 2025-03-02 NOTE — DISCHARGE INSTRUCTIONS
Hold your anticoagulation medication until your urine no longer shows any blood I asked you to follow-up with urology and your internal medicine physician.  Antibiotics have been sent to your pharmacy

## 2025-03-03 ENCOUNTER — APPOINTMENT (OUTPATIENT)
Dept: PRIMARY CARE | Facility: CLINIC | Age: 80
End: 2025-03-03
Payer: MEDICARE

## 2025-03-03 VITALS
BODY MASS INDEX: 32.86 KG/M2 | HEART RATE: 91 BPM | SYSTOLIC BLOOD PRESSURE: 107 MMHG | DIASTOLIC BLOOD PRESSURE: 67 MMHG | TEMPERATURE: 96.6 F | WEIGHT: 229 LBS

## 2025-03-03 DIAGNOSIS — R31.0 MACROSCOPIC HEMATURIA: Primary | ICD-10-CM

## 2025-03-03 PROCEDURE — 3078F DIAST BP <80 MM HG: CPT | Performed by: FAMILY MEDICINE

## 2025-03-03 PROCEDURE — 1126F AMNT PAIN NOTED NONE PRSNT: CPT | Performed by: FAMILY MEDICINE

## 2025-03-03 PROCEDURE — 1159F MED LIST DOCD IN RCRD: CPT | Performed by: FAMILY MEDICINE

## 2025-03-03 PROCEDURE — 3074F SYST BP LT 130 MM HG: CPT | Performed by: FAMILY MEDICINE

## 2025-03-03 PROCEDURE — 1124F ACP DISCUSS-NO DSCNMKR DOCD: CPT | Performed by: FAMILY MEDICINE

## 2025-03-03 PROCEDURE — 99214 OFFICE O/P EST MOD 30 MIN: CPT | Performed by: FAMILY MEDICINE

## 2025-03-03 ASSESSMENT — PAIN SCALES - GENERAL: PAINLEVEL_OUTOF10: 0-NO PAIN

## 2025-03-03 NOTE — ASSESSMENT & PLAN NOTE
-Xarelto was held for 2 days in the ER.  INR was 2.7.   -Urine culture is pending.  UA was suspicious for UTI.  -Cephalexin was prescribed for 7 days in the ER.  -referral to a urologist was done.

## 2025-03-03 NOTE — PROGRESS NOTES
"Subjective   Patient ID: Britta Fan is a 79 y.o. male who presents for Follow-up.  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  He went in for a macroscopic hematuria.   -Xarelto was held for 2 days in the ER.  INR was 2.7.   -Urine culture is pending.  UA was suspicious for UTI.  -Cephalexin was prescribed for 7 days in the ER.  -referral to a urologist was done.     The bleeding stopped yesterday.     A review of system was completed.  All systems were reviewed and were normal, except for the ones that are listed in the HPI.    Objective   Physical Exam  Constitutional:       Appearance: Normal appearance.   HENT:      Head: Normocephalic and atraumatic.      Right Ear: Tympanic membrane, ear canal and external ear normal.      Left Ear: Tympanic membrane, ear canal and external ear normal.      Nose: Nose normal.      Mouth/Throat:      Mouth: Mucous membranes are moist.      Pharynx: Oropharynx is clear.   Eyes:      Extraocular Movements: Extraocular movements intact.      Conjunctiva/sclera: Conjunctivae normal.      Pupils: Pupils are equal, round, and reactive to light.   Cardiovascular:      Rate and Rhythm: Normal rate and regular rhythm.      Pulses: Normal pulses.   Pulmonary:      Effort: Pulmonary effort is normal.      Breath sounds: Normal breath sounds.   Abdominal:      General: Abdomen is flat. Bowel sounds are normal.      Palpations: Abdomen is soft.   Musculoskeletal:         General: Normal range of motion.      Cervical back: Normal range of motion and neck " supple.   Skin:     General: Skin is warm.   Neurological:      General: No focal deficit present.      Mental Status: He is alert and oriented to person, place, and time. Mental status is at baseline.   Psychiatric:         Mood and Affect: Mood normal.         Behavior: Behavior normal.         Thought Content: Thought content normal.         Judgment: Judgment normal.   Assessment/Plan   Problem List Items Addressed This Visit       Macroscopic hematuria - Primary     -Xarelto was held for 2 days in the ER.  INR was 2.7.   -Urine culture is pending.  UA was suspicious for UTI.  -Cephalexin was prescribed for 7 days in the ER.  -referral to a urologist was done.          Patient to return to office in 6 months, sooner if needed.

## 2025-03-04 ENCOUNTER — APPOINTMENT (OUTPATIENT)
Dept: CARDIOLOGY | Facility: HOSPITAL | Age: 80
End: 2025-03-04
Payer: MEDICARE

## 2025-03-04 LAB — BACTERIA UR CULT: NORMAL

## 2025-03-07 DIAGNOSIS — N40.1 BENIGN PROSTATIC HYPERPLASIA WITH LOWER URINARY TRACT SYMPTOMS, SYMPTOM DETAILS UNSPECIFIED: ICD-10-CM

## 2025-03-08 RX ORDER — FINASTERIDE 5 MG/1
5 TABLET, FILM COATED ORAL DAILY
Qty: 90 TABLET | Refills: 1 | Status: SHIPPED | OUTPATIENT
Start: 2025-03-08

## 2025-03-18 ENCOUNTER — OFFICE VISIT (OUTPATIENT)
Dept: UROLOGY | Facility: HOSPITAL | Age: 80
End: 2025-03-18
Payer: MEDICARE

## 2025-03-18 ENCOUNTER — LAB (OUTPATIENT)
Dept: LAB | Facility: HOSPITAL | Age: 80
End: 2025-03-18
Payer: MEDICARE

## 2025-03-18 DIAGNOSIS — R31.0 GROSS HEMATURIA: Primary | ICD-10-CM

## 2025-03-18 DIAGNOSIS — N40.1 BENIGN PROSTATIC HYPERPLASIA WITH LOWER URINARY TRACT SYMPTOMS, SYMPTOM DETAILS UNSPECIFIED: ICD-10-CM

## 2025-03-18 LAB
POC APPEARANCE, URINE: CLEAR
POC BILIRUBIN, URINE: NEGATIVE
POC BLOOD, URINE: ABNORMAL
POC COLOR, URINE: YELLOW
POC GLUCOSE, URINE: NEGATIVE MG/DL
POC KETONES, URINE: NEGATIVE MG/DL
POC LEUKOCYTES, URINE: NEGATIVE
POC NITRITE,URINE: NEGATIVE
POC PH, URINE: 6 PH
POC PROTEIN, URINE: NEGATIVE MG/DL
POC SPECIFIC GRAVITY, URINE: 1.02
POC UROBILINOGEN, URINE: 0.2 EU/DL

## 2025-03-18 PROCEDURE — 1160F RVW MEDS BY RX/DR IN RCRD: CPT | Performed by: NURSE PRACTITIONER

## 2025-03-18 PROCEDURE — 99204 OFFICE O/P NEW MOD 45 MIN: CPT | Performed by: NURSE PRACTITIONER

## 2025-03-18 PROCEDURE — 81001 URINALYSIS AUTO W/SCOPE: CPT

## 2025-03-18 PROCEDURE — 99214 OFFICE O/P EST MOD 30 MIN: CPT | Performed by: NURSE PRACTITIONER

## 2025-03-18 PROCEDURE — 81003 URINALYSIS AUTO W/O SCOPE: CPT | Performed by: NURSE PRACTITIONER

## 2025-03-18 PROCEDURE — G2211 COMPLEX E/M VISIT ADD ON: HCPCS | Performed by: NURSE PRACTITIONER

## 2025-03-18 PROCEDURE — 1036F TOBACCO NON-USER: CPT | Performed by: NURSE PRACTITIONER

## 2025-03-18 PROCEDURE — 1159F MED LIST DOCD IN RCRD: CPT | Performed by: NURSE PRACTITIONER

## 2025-03-18 NOTE — PROGRESS NOTES
[facility]  Outpatient Clinic Note    Patient Name:  Cristy Forte  MRN:  [id number]  :  1945    Referring Provider: Alcon Rawls MD  Date of Service: 3/18/2025   Visit type: New patient visit     problem list/Chief complaint:  Gross hematuria  BPH - Finasteride 5 mg  ED      HISTORY OF PRESENT ILLNESS:  Cristy Forte is a 79 y.o. male with past medical history of HTN, HLD, BPH, CAD s/p LAD stenting, Afib on xarelto, s/p AVR, right cervical radiculopathy s/p cervical fusion, lumbar radiculopathy s/p bilateral C7 pedicle screw fractures, PE s/p TKA 2010, GERD, ED, adrenal adenoma, DMII, CVA, who presents for initial Urology visit. I performed a detailed review of the medical chart records lab testing and imaging. Patient was seen in ER on 3/2/25 with abdominal pain and blood in urine, ultrasound with nondistended bladder, UA negative for UTI Xarelto placed on hold, patient discharged with outpatient Urology referral.  Patient states he saw blood in his urine about 2-3 weeks ago, he has not had any more hematuria since then. Patient has no urinary complaints today. Denies dysuria, flank pain, and bothersome frequency or urgency     -Urinary infection? no  -Enlarged prostate in older men?yes   -Kidney or bladder stones? no  -Prostate infection? no  -Kidney disease? no   -Kidney trauma? no  -Family hx or personal hx of bladder cancer, Kidney cancer or Cancer of the lining of the urinary tract? no  -Anti-swelling drugs (joint swelling and pain pills)? No, takes Xarelto   -Vigorous workout? no  -Smoker? no  -Workplace exposure to chemicals such as benzene or aromatic amines? Yes, was a  for years  -Urinary catheter? no    I personally reviewed US bladder dated 3/2/25.   IMPRESSION:  Suboptimal distention of the urinary bladder. Patient was unable to  void.    PAST MEDICAL HISTORY:  Past Medical History:   Diagnosis Date    Cerebral infarction, unspecified 2020    Brainstem  stroke    Encounter for screening for malignant neoplasm of rectum     Encounter for screening for malignant neoplasm of rectum    Impacted cerumen, bilateral 12/05/2016    Impacted cerumen of both ears    Inguinal hernia 02/07/2023    Nocturia     Nocturia    Other conditions influencing health status     Screening for malignant neoplasms, colon    Other spondylosis with myelopathy, cervical region 02/07/2023    Personal history of other diseases of male genital organs 02/15/2019    History of chronic prostatitis    Personal history of other diseases of the circulatory system 04/08/2015    History of angina pectoris    Personal history of other diseases of the respiratory system 10/14/2015    History of influenza    Personal history of other drug therapy 12/11/2014    History of pneumococcal vaccination    Personal history of other specified conditions 02/06/2019    History of odynophagia    Personal history of other specified conditions     History of edema    Radiculopathy, cervical region     Cervical radiculopathy    Stenosis, cervical spine 02/07/2023    Unspecified hemorrhoids     Bleeding hemorrhoids       PAST SURGICAL HISTORY:  Past Surgical History:   Procedure Laterality Date    CERVICAL FUSION  10/03/2013    Cervical Vertebral Fusion    KNEE SURGERY  12/11/2014    Knee Surgery    MR HEAD ANGIO WO IV CONTRAST  6/21/2020    MR HEAD ANGIO WO IV CONTRAST 6/21/2020 [facility]    MR HEAD ANGIO WO IV CONTRAST  10/30/2022    MR HEAD ANGIO WO IV CONTRAST 10/30/2022 [facility]    MR NECK ANGIO WO IV CONTRAST  6/21/2020    MR NECK ANGIO WO IV CONTRAST 6/21/2020 [facility]    MR NECK ANGIO WO IV CONTRAST  10/30/2022    MR NECK ANGIO WO IV CONTRAST 10/30/2022 [facility]    OTHER SURGICAL HISTORY  01/09/2019    Tonsillectomy    OTHER SURGICAL HISTORY  01/09/2019    Adenoidectomy    OTHER SURGICAL HISTORY  02/18/2020    Aortic valve replacement    PILONIDAL CYST DRAINAGE   2014    Pilonidal Cyst Resection       ALLERGIES:  Allergies   Allergen Reactions    Iodinated Contrast Media Hives    Latex Unknown       MEDICATIONS:  Current Outpatient Medications   Medication Instructions    aspirin 81 mg EC tablet 1 tablet, Daily    atorvastatin (LIPITOR) 80 mg, oral, Daily    blood sugar diagnostic strip 1 strip, miscellaneous, 2 times daily PRN    blood-glucose meter misc Use twice per day PRN    chlorthalidone (HYGROTON) 12.5 mg, oral, Daily    diphenhydrAMINE (Benadryl Allergy) 50 mg tablet Take 1 Benadryl an hour before the procedure.  CT scan.    ezetimibe (ZETIA) 10 mg, oral, Daily    finasteride (PROSCAR) 5 mg, oral, Daily    lancets misc 1 Lancet, miscellaneous, 2 times daily PRN, Use twice daily PRN    losartan (COZAAR) 50 mg, oral, Daily    metFORMIN XR (GLUCOPHAGE-XR) 750 mg, oral, Daily with evening meal, Do not crush, chew, or split.    pantoprazole (ProtoNix) 40 mg EC tablet TAKE 1 TABLET EVERY MORNING BEFORE A MEAL. DO NOT CRUSH, CHEW, OR SPLIT.    potassium chloride CR 20 mEq ER tablet 20 mEq, oral, Daily    predniSONE (Deltasone) 50 mg tablet Take 1 tablet at 13 hours, 7hours and 1 hours before the CT scan    rivaroxaban (XARELTO) 20 mg, oral, Daily with evening meal    spironolactone (ALDACTONE) 25 mg, oral, Daily        SOCIAL HISTORY:  Social History     Tobacco Use    Smoking status: Former     Current packs/day: 0.00     Types: Cigarettes     Quit date:      Years since quittin.2     Passive exposure: Never    Smokeless tobacco: Never   Substance Use Topics    Alcohol use: Yes     Comment: OCASSIONALLY    Drug use: Never        FAMILY HISTORY:  Family History   Problem Relation Name Age of Onset    Hypertension Mother      Diabetes Father      Hypertension Father          REVIEW OF SYSTEMS:  10-pt ROS reviewed and negative except as mentioned above.    Vital signs:  There were no vitals taken for this visit.    PHYSICAL EXAMINATION:  General: Appears  comfortable and in no apparent distress.  Head: Normocephalic, atraumatic  Eyes: Non-injected conjunctiva, sclera clear, no proptosis  Lungs: Breathing is easy, non-labored. Speaking in clear and complete sentences. Normal diaphragmatic movement.  Cardiovascular: no peripheral edema, cyanosis or pallor.   Abdomen: soft, non-distended, non-tender  : Bladder: non tender, not distended  MSK: Ambulatory with steady gait, unassisted  Skin: No visible rashes or lesions  Neurologic: Alert, oriented to person, place, and time  Psychiatric: mood and affect appropriate      IMAGING DATA:   US bladder  Narrative: Interpreted By:  Hai Vyas,   STUDY:  US BLADDER;  3/2/2025 3:50 pm      INDICATION:  Signs/Symptoms:Hematuria.          COMPARISON:  None.      ACCESSION NUMBER(S):  [id number]      ORDERING CLINICIAN:  ANGELICA HO      TECHNIQUE:  Grayscale imaging and color Doppler were used in evaluating the  urinary bladder.      FINDINGS:  The bladder was only mildly distended. Prevoid bladder volume was  only 23 mL. Postvoid bladder volume was 19 mL. Patient was unable to  void. Neither ureteral jet was demonstrated. There was no gross  shadowing stone in the bladder. There was circumferential bladder  wall prominence however.      Impression: Suboptimal distention of the urinary bladder. Patient was unable to  void.      Circumferential bladder wall prominence is nonspecific and is likely  artifact due to suboptimal level of distention. Cystitis and muscular  hypertrophy are the differential considerations.      MACRO:  None      Signed by: Hai Vyas 3/2/2025 4:05 PM  Dictation workstation:   [id number]      LABORATORY DATA:    Office Visit on 03/18/2025   Component Date Value    POC Color, Urine 03/18/2025 Yellow     POC Appearance, Urine 03/18/2025 Clear     POC Glucose, Urine 03/18/2025 NEGATIVE     POC Bilirubin, Urine 03/18/2025 NEGATIVE     POC Ketones, Urine 03/18/2025 NEGATIVE     POC Specific [place],  Ur* 03/18/2025 1.020     POC Blood, Urine 03/18/2025 SMALL (1+) (A)     POC PH, Urine 03/18/2025 6.0     POC Protein, Urine 03/18/2025 NEGATIVE     POC Urobilinogen, Urine 03/18/2025 0.2     Poc Nitrite, Urine 03/18/2025 NEGATIVE     POC Leukocytes, Urine 03/18/2025 NEGATIVE        ASSESSMENT:  Cristy Forte is a 79 y.o. male with BPH, ED, gross hematuria, who presents for initial Urology visit.    Discussed the potential etiologies for hematuria, including physiologic, anticoagulation, NSAIDS, infection, stones, masses/cysts, inflammation, CKD, nephritis, voiding dysfunction, foreign bodies, malignancies, etc     To address the patient’s hematuria, I recommended the patient follow up with diagnostic hematuria workup which includes cystoscopy, urine culture, urine cytology, and CT Urogram. Patient is aware of the risks associated with intravenous contrast. There is no history of renal dysfunction. Risks of cystoscopy were discussed with the patient in great detail, including the risk of hematuria, UTI and discomfort. Patient understands and desires to proceed.     PLAN:  -Reviewed US bladder results with patient  -UA today with small blood. Urine microscopic and cytology ordered  -MR urogram ordered to rule out malignancy (patient has contrast allergy)  -Referral for cystoscopy placed for gross hematuria  -Follow up as scheduled, or sooner if needed    All questions and concerns were addressed. Patient verbalizes understanding and has no other questions at this time.     E&M visit today is associated with current or anticipated ongoing medical care services related to a patient's single, serious condition or a complex condition.    KAMI Blackwell-CNP  [facility]  3/18/2025 11:58 AM

## 2025-03-19 LAB
RBC #/AREA URNS AUTO: NORMAL /HPF
WBC #/AREA URNS AUTO: NORMAL /HPF

## 2025-03-20 LAB
LABORATORY COMMENT REPORT: NORMAL
LABORATORY COMMENT REPORT: NORMAL
PATH REPORT.FINAL DX SPEC: NORMAL
PATH REPORT.GROSS SPEC: NORMAL
PATH REPORT.RELEVANT HX SPEC: NORMAL
PATH REPORT.TOTAL CANCER: NORMAL

## 2025-03-24 DIAGNOSIS — Z79.4 TYPE 2 DIABETES MELLITUS WITH OTHER SPECIFIED COMPLICATION, WITH LONG-TERM CURRENT USE OF INSULIN: ICD-10-CM

## 2025-03-24 DIAGNOSIS — E11.69 TYPE 2 DIABETES MELLITUS WITH OTHER SPECIFIED COMPLICATION, WITH LONG-TERM CURRENT USE OF INSULIN: ICD-10-CM

## 2025-03-26 RX ORDER — METFORMIN HYDROCHLORIDE 750 MG/1
TABLET, EXTENDED RELEASE ORAL
Qty: 90 TABLET | Refills: 1 | Status: SHIPPED | OUTPATIENT
Start: 2025-03-26

## 2025-03-27 DIAGNOSIS — I10 PRIMARY HYPERTENSION: ICD-10-CM

## 2025-03-28 ENCOUNTER — APPOINTMENT (OUTPATIENT)
Dept: UROLOGY | Facility: CLINIC | Age: 80
End: 2025-03-28
Payer: MEDICARE

## 2025-03-28 VITALS
DIASTOLIC BLOOD PRESSURE: 74 MMHG | BODY MASS INDEX: 33.41 KG/M2 | WEIGHT: 233.4 LBS | SYSTOLIC BLOOD PRESSURE: 108 MMHG | HEIGHT: 70 IN | HEART RATE: 83 BPM | TEMPERATURE: 96.5 F

## 2025-03-28 DIAGNOSIS — R31.0 GROSS HEMATURIA: Primary | ICD-10-CM

## 2025-03-28 DIAGNOSIS — R31.0 GROSS HEMATURIA: ICD-10-CM

## 2025-03-28 LAB
POC APPEARANCE, URINE: ABNORMAL
POC BILIRUBIN, URINE: NEGATIVE
POC BLOOD, URINE: ABNORMAL
POC COLOR, URINE: ABNORMAL
POC GLUCOSE, URINE: NEGATIVE MG/DL
POC KETONES, URINE: NEGATIVE MG/DL
POC LEUKOCYTES, URINE: NEGATIVE
POC NITRITE,URINE: NEGATIVE
POC PH, URINE: 6 PH
POC PROTEIN, URINE: ABNORMAL MG/DL
POC SPECIFIC GRAVITY, URINE: 1.02
POC UROBILINOGEN, URINE: 0.2 EU/DL

## 2025-03-28 PROCEDURE — 81003 URINALYSIS AUTO W/O SCOPE: CPT | Performed by: UROLOGY

## 2025-03-28 PROCEDURE — 52000 CYSTOURETHROSCOPY: CPT | Performed by: UROLOGY

## 2025-03-28 ASSESSMENT — PAIN SCALES - GENERAL: PAINLEVEL_OUTOF10: 0-NO PAIN

## 2025-03-28 NOTE — PROGRESS NOTES
Patient allergic to iodine contrast, MR urogram ordered. Received message requesting new order, MR urogram reordered.    KAMI Blackwell-CNP  [facility]  3/28/2025 2:17 PM

## 2025-03-31 DIAGNOSIS — I10 PRIMARY HYPERTENSION: ICD-10-CM

## 2025-03-31 RX ORDER — CHLORTHALIDONE 25 MG/1
25 TABLET ORAL DAILY
Qty: 90 TABLET | Refills: 0 | Status: SHIPPED | OUTPATIENT
Start: 2025-03-31

## 2025-03-31 RX ORDER — LOSARTAN POTASSIUM 50 MG/1
50 TABLET ORAL DAILY
Qty: 90 TABLET | Refills: 3 | Status: SHIPPED | OUTPATIENT
Start: 2025-03-31 | End: 2026-03-31

## 2025-03-31 RX ORDER — LOSARTAN POTASSIUM 50 MG/1
50 TABLET ORAL DAILY
Qty: 90 TABLET | Refills: 3 | Status: SHIPPED | OUTPATIENT
Start: 2025-03-31 | End: 2025-03-31 | Stop reason: SDUPTHER

## 2025-03-31 NOTE — PROGRESS NOTES
79-year-old male with gross hematuria.    He is here for cystoscopy.  MRI is pending.    He reports that the hematuria is currently resolved.    Informed consent was obtained.  He was prepped and draped in standard fashion.  Flexible cystoscope was advanced per urethra.  Penile urethra was normal.  Within the proximal bulbar urethra, there was some wide bore narrowing that I was able to transversed with the scope.  External sphincter was normal.  Prostate had modest lateral enlargement but not nothing too severe.  Bladder was entered and distended.  There were no intravesical lesions, foreign bodies, stones, tumors.  The scope was removed, patient tolerated the procedure well.    He will get his MRI scheduled and follow-up with our [facility]

## 2025-04-04 ENCOUNTER — APPOINTMENT (OUTPATIENT)
Dept: UROLOGY | Facility: CLINIC | Age: 80
End: 2025-04-04
Payer: MEDICARE

## 2025-04-10 ENCOUNTER — PATIENT OUTREACH (OUTPATIENT)
Dept: PRIMARY CARE | Facility: CLINIC | Age: 80
End: 2025-04-10
Payer: MEDICARE

## 2025-04-10 ENCOUNTER — OFFICE VISIT (OUTPATIENT)
Dept: NEUROLOGY | Facility: HOSPITAL | Age: 80
End: 2025-04-10
Payer: MEDICARE

## 2025-04-10 DIAGNOSIS — G20.C PARKINSONISM, UNSPECIFIED PARKINSONISM TYPE (MULTI): Primary | ICD-10-CM

## 2025-04-10 DIAGNOSIS — I25.83 CORONARY ATHEROSCLEROSIS DUE TO LIPID RICH PLAQUE: ICD-10-CM

## 2025-04-10 PROCEDURE — 99215 OFFICE O/P EST HI 40 MIN: CPT | Performed by: PSYCHIATRY & NEUROLOGY

## 2025-04-10 RX ORDER — CARBIDOPA AND LEVODOPA 25; 100 MG/1; MG/1
TABLET ORAL
Qty: 90 TABLET | Refills: 2 | Status: SHIPPED | OUTPATIENT
Start: 2025-04-10

## 2025-04-10 NOTE — PATIENT INSTRUCTIONS
You were seen today by Dr. Antonio.  It is a pleasure seeing you.    Given the history and today's evaluation, I suspect motor features suggestive of parkinsonism, we will try Sinemet.    PLAN:    -Continue current medications for stroke prevention  - There are clinical findings suggestive of Parkinsonism. This disease results from degeneration of dopamine-secreting cells in the motor control areas of your brain. Parkinsonism can cause any combination of the following symptoms: tremor at rest, slowness of movement, muscle stiffness, and poor balance, among others. Although there is no treatment to cure or slow the progression of this disease, several medical options are available for symptom control.         - I recommend developing exercise routine with fast paced exercise preferably cycling up to 80 cycles per minute. The exercise routine should be at least 30 minutes at a time and at least 3 days a week. Remember that exercise needs to be sustained to be beneficial.     - Will prescribe carbidopa/levodopa 25/100 mg : Take half a tablet 3 times daily at 9 AM, 1 PM, 5 PM for 1 week then go up to one full tablet at the same times for another week, then 1.5 tabs three times daily at the same times for another week then 2 tabs three times daily and thereafter . Possible side effects include nausea, lightheadedness, constipation and hallucinations if taken late at night.      -We discussed a a safe sleep environment and fall prevention in details     - I will refer you to physical therapy for balance training and occupational therapy.      - Follow up in three months with me.     - The impression and plan were discussed with the patient and their family (if present) in detail and all questions answered. They agreed to the plan as outlined above.     For more information, please check:     [employer] Inc[employer]  <http://www.parkinson.org>     The American Parkinson Disease  Association  <http://www.apdaparkinson.org>     The [employer] for  Parkinson's Research  <[email or web address]>     Please call [phone] if you have any questions

## 2025-04-10 NOTE — PROGRESS NOTES
Provider impressions:  Mr. Forte is a pleasant 79-year-old right-handed with 15 years of formal education and PMHx of HTN, HLD, Diabetes who is presenting today for post hospitalization follow-up. I saw him in 12/2024 when he had embolic stroke, MRI brain wo contrast on 12/30/24 showed multiple scattered small foci of abnormally restricted diffusion consistent with acute to subacute infarctions including left external capsule, right putamen and external capsule, left thalamus, left periatrial white matter, cortex and juxtacortical white matter of both cerebral hemispheres, right cerebral peduncle, paramedian right dotty, and cerebellar vermis. There is white matter hyperintense lesions consistent with microangiopathy, no acute intracranial hemorrhage. No extra-axial fluid collection. No intracranial mass. Major vascular flow voids intact. He is already taking Xarelto, ASA 81 mg and statin, No one has healthcare POA at this time. Patient lives alone and daughter lives nearby. Neurological examination is showing  motor parkinsonian features. We will try Sinemet.    PLAN:    -Continue current medications for stroke prevention  - There are clinical findings suggestive of Parkinsonism. This disease results from degeneration of dopamine-secreting cells in the motor control areas of your brain. Parkinsonism can cause any combination of the following symptoms: tremor at rest, slowness of movement, muscle stiffness, and poor balance, among others. Although there is no treatment to cure or slow the progression of this disease, several medical options are available for symptom control.   - I recommend developing exercise routine with fast paced exercise preferably cycling up to 80 cycles per minute. The exercise routine should be at least 30 minutes at a time and at least 3 days a week. Remember that exercise needs to be sustained to be beneficial.     - Will prescribe carbidopa/levodopa 25/100 mg : Take half a tablet 3  times daily at 9 AM, 1 PM, 5 PM for 1 week then go up to one full tablet at the same times for another week, then 1.5 tabs three times daily at the same times for another week then 2 tabs three times daily and thereafter . Possible side effects include nausea, lightheadedness, constipation and hallucinations if taken late at night.      -We discussed a a safe sleep environment and fall prevention in details     - I will refer you to physical therapy for balance training and occupational therapy.      - Follow up in three months with me.     - The impression and plan were discussed with the patient and their family (if present) in detail and all questions answered. They agreed to the plan as outlined above.     For more information, please check:     National Parkinson Foundation, Inc[employer]  <http://www.parkinson.org>     The American Parkinson Disease Association  <http://www.apdaparkinson.org>     The [employer] for  Parkinson's Research  <[email or web address]>     Please call [phone] if you have any questions     Subjective     Mr. Forte is a pleasant 79-year-old right-handed with 15 years of formal education and PMHx of HTN, HLD,Diabetes who is presenting today for post hospitalization follow-up. I saw him in 12/2024 when he had embolic stroke, MRI brain wo contrast on 12/30/24 showed multiple scattered small foci of abnormally restricted diffusion consistent with acute to subacute infarctions including left external capsule, right putamen and external capsule, left thalamus, left periatrial white matter, cortex and juxtacortical white matter of both cerebral hemispheres, right cerebral peduncle, paramedian right dotty, and cerebellar vermis. There is white matter hyperintense lesions consistent with microangiopathy, no acute intracranial hemorrhage. No extra-axial fluid collection. No intracranial mass. Major vascular flow voids intact. He is already taking Xarelto, ASA 81 mg and statin.  "    Mood is \"good.\". has not noticed any significant depressive symptoms. Gets frustrated with memory problems.   Cristy worries about health and future options for self.     Has good appetite - eats healthy.     No change in personality, social disinhibition, change in dietary preferences, loss of empathy, stereotyped or repetitive behaviors.   Peripheral visual hallucinations. No fluctuating cognition, tremors, REM sleep behavior disorder, fall.    Functional changes:   Born and raised in [place], [place]  Sleep:   Current living situation - lives in [place] alone, daughter lives nearby .   Driving - Independently.   Finances - Independent.   Cooking - slelf cooks.   ADLS - independent.   Medications - Takes own medication;  uses pillbox.   Weapons at home: Yes, locked away  Knows 911? Yes, emergencies    Neuropsychiatric symptoms:   Depression - no depression. Appetite ok. Sleeping fine. No worthlessness/helplessness. No suicidal thoughts, intent or plans.   Anxiety - yes, especially with traffic and waiting long periods of time.   Psychosis - Denies.   [place] - Denies.   Suicidality - Denies.     Prior Work-up:   Normal TSH,  No Vit B12.   Neuropsychological testing    Past Neuropsychiatric History:  Handedness: right.   History of traumatic brain injury: None.   History of seizures: None  History of stroke: None.   Past psychiatric history: None    PMH/PSH:  As above, in addition    Meds: reviewed as listed    Allergies: reviewed as listed    Family history:   Psychiatric: None.   Dementia: None   Parkinsons disease: None  Huntingtons disease: None  ALS: None    Social History:   Quit Tobacco use quit 35yrs ago, No alcohol quit 5yrs ago, No recreational drugs Quit 62, Marijuana.   twice, 1st , 2nd divorce . Has 2  children   Worked as [employer] as a - retired.    Mental Status Examination:  General appearance: Well-groomed, good eye contact, cooperative  Orientation: Alert and " "oriented to person, place, and time  Motor: no psychomotor agitation or retardation, stable gait  Speech: regular rate, rhythm, tone, and volume  Mood: \"good\"  Affect: stable, full range, with brightening, and mood congruent  Passive death wish: denies  Suicidal ideation: denies  Thought process: logical, linear, and goal-directed without loosening of associations  Thought content: no hallucinations, delusions, and not responding to internal stimuli  Insight/Judgment: good/good  Praxis: Transitive/Intransitive/Orofacial  Fund of knowledge: good  Recent and remote memory: good  Attention span and concentration: good  Language: normal receptive and expressive language without paraphrasic errors    Objective     Visit Vitals  Smoking Status Former     Neurological Exam  Physical Exam  Mental Status Examination:  General appearance: Well-groomed, good eye contact, cooperative  Orientation: Alert and oriented to person, place, and time  Motor: no psychomotor agitation or retardation  Speech: regular rate, rhythm, tone, and volume    NEUROLOGICAL EXAMINATION  Cranial nerves:  CN II: visual fields full to confrontation  CN III, IV, VI: Pupils round, reactive to light and accommodation.  Lids symmetric.  No ptosis.  Extra-occular muscles are intact with normal alignment.  No nystagmus  CN V: Facial sensation intact bilaterally.    CN VII: Normal and symmetric facial strength.  Nasolabial folds symmetric  CN VIII: Hearing intact to finger rub  CN IX: Palate elevates symmetrically.  CN XI: Normal shoulder shrug and neck turning  CN XII: Tongue midline, with normal bulk and strength, no fasciculations        Motor:  Muscle bulk was normal in all extremities.  5/5 strength in all extremities  Slow finger taps and hand opening  Increased tone in BLE  No abnormal movements    Reflexes:   Right UE     LEFT UE  BR: 2          BR: 2  Biceps: 2    Biceps: 2  Triceps: 2   Triceps: 2    RIGHT LE     LEFT LE  Knee: 2        Knee: " 2  Ankle: 1       Ankle: 1    Negative Eber's reflex  No frontal release signs    Coordination:  Normal finger to nose testing and rapid alternating movements    Gait:  Reduced arm swing, shuffling gait    Sensory:  Negative Romberg's sign   Extensive review of notes in EMR, labs, tests   HDL-Cholesterol   Date Value Ref Range Status   01/01/2025 33.8 mg/dL Final     Comment:       Age       Very Low   Low     Normal    High    0-19 Y    < 35      < 40     40-45     ----  20-24 Y    ----     < 40      >45      ----        >24 Y      ----     < 40     40-60      >60       Cholesterol/HDL Ratio   Date Value Ref Range Status   01/01/2025 2.5  Final     Comment:       Ref Values  Desirable  < 3.4  High Risk  > 5.0     VLDL   Date Value Ref Range Status   01/01/2025 15 0 - 40 mg/dL Final     Triglycerides   Date Value Ref Range Status   01/01/2025 75 0 - 149 mg/dL Final     Comment:     Age              Desirable        Borderline         High        Very High  SEX:B           mg/dL             mg/dL               mg/dL      mg/dL  <=14D                       ----               ----        ----  15D-365D                    ----               ----        ----  1Y-9Y           0-74               75-99             >=100       ----  10Y-19Y        0-89                            >=130       ----  20Y-24Y        0-114             115-149             >=150      ----  >= 25Y         0-149             150-199             200-499    >=500      Venipuncture immediately after or during the administration of Metamizole may lead to falsely low results. Testing should be performed immediately prior to Metamizole dosing.     Non HDL Cholesterol   Date Value Ref Range Status   01/01/2025 50 0 - 149 mg/dL Final     Comment:           Age       Desirable   Borderline High   High     Very High     0-19 Y     0 - 119       120 - 144     >/= 145    >/= 160    20-24 Y     0 - 149       150 - 189     >/= 190      ----          >24 Y    30 mg/dL above LDL Cholesterol goal       Hemoglobin A1C   Date Value Ref Range Status   12/30/2024 6.2 (H) See comment % Final     Estimated Average Glucose   Date Value Ref Range Status   12/30/2024 131 Not Established mg/dL Final     Creatinine   Date Value Ref Range Status   03/02/2025 1.03 0.50 - 1.30 mg/dL Final     eGFR   Date Value Ref Range Status   03/02/2025 75 >60 mL/min/1.73m*2 Final     Comment:     Calculations of estimated GFR are performed using the 2021 CKD-EPI Study Refit equation without the race variable for the IDMS-Traceable creatinine methods.  https://jasn.asnjournals.org/content/early/2021/09/22/ASN.2339164245     Protime   Date Value Ref Range Status   03/02/2025 30.4 (H) 9.8 - 12.4 seconds Final     INR   Date Value Ref Range Status   03/02/2025 2.7 (H) 0.9 - 1.1 Final     Results for orders placed during the hospital encounter of 12/30/24    Transthoracic Echo (TTE) Limited    [facility], [address] [place] [address]  Tel [phone] and Fax [phone]    TRANSTHORACIC ECHOCARDIOGRAM REPORT      Patient Name:       BRITTA Gomez Physician:    [id number] Stephen Pulido MD  Study Date:         12/30/2024          Ordering Provider:    [id number] JAIME SILVERMAN  MRN/PID:            [id number]            Fellow:  Accession#:         [id number]        Nurse:  Date of Birth/Age:  1945 / 79     Sonographer:          Hermila Mac RDCS  years  Gender assigned at  M                   Additional Staff:  Birth:  Height:             177.80 cm           Admit Date:           12/30/2024  Weight:             103.87 kg           Admission Status:     Inpatient -  Priority  discharge  BSA / BMI:          2.21 m2 / 32.86     Encounter#:           [id number]  kg/m2  Blood Pressure:     137/65 mmHg         Department Location:  [facility] Non  Invasive    Study Type:    TRANSTHORACIC ECHO (TTE) LIMITED  Diagnosis/ICD: Cerebrovascular disease,  unspecified-I67.9  Indication:    Transischemic Attack  CPT Code:      Echo Limited-85807    Patient History:  Diabetes:          Yes  Pertinent History: A-Fib, CAD, CVA, PE, HTN and Hyperlipidemia. S/P AVR 25mm  freestyle stentless AV bioprosthesis.    Study Detail: The following Echo studies were performed: 2D, M-Mode, Doppler and  color flow. Agitated saline used as a contrast agent for  intraseptal flow evaluation       ROS: All systems reviewed, pertinent positives noted in HPI

## 2025-04-13 RX ORDER — ATORVASTATIN CALCIUM 80 MG/1
80 TABLET, FILM COATED ORAL DAILY
Qty: 90 TABLET | Refills: 3 | Status: SHIPPED | OUTPATIENT
Start: 2025-04-13

## 2025-04-20 DIAGNOSIS — K21.9 GERD WITHOUT ESOPHAGITIS: ICD-10-CM

## 2025-04-23 RX ORDER — PANTOPRAZOLE SODIUM 40 MG/1
TABLET, DELAYED RELEASE ORAL
Qty: 90 TABLET | Refills: 1 | Status: SHIPPED | OUTPATIENT
Start: 2025-04-23

## 2025-05-01 DIAGNOSIS — I26.99 MULTIPLE PULMONARY EMBOLI (MULTI): ICD-10-CM

## 2025-05-02 RX ORDER — RIVAROXABAN 20 MG/1
TABLET, FILM COATED ORAL
Qty: 90 TABLET | Refills: 3 | Status: SHIPPED | OUTPATIENT
Start: 2025-05-02

## 2025-06-03 ENCOUNTER — OFFICE VISIT (OUTPATIENT)
Dept: CARDIOLOGY | Facility: HOSPITAL | Age: 80
End: 2025-06-03
Payer: MEDICARE

## 2025-06-03 VITALS
OXYGEN SATURATION: 96 % | SYSTOLIC BLOOD PRESSURE: 116 MMHG | WEIGHT: 231.9 LBS | BODY MASS INDEX: 32.47 KG/M2 | DIASTOLIC BLOOD PRESSURE: 72 MMHG | HEIGHT: 71 IN | HEART RATE: 88 BPM

## 2025-06-03 DIAGNOSIS — Z95.2 S/P AVR: Primary | ICD-10-CM

## 2025-06-03 DIAGNOSIS — I10 PRIMARY HYPERTENSION: ICD-10-CM

## 2025-06-03 DIAGNOSIS — I25.83 CORONARY ATHEROSCLEROSIS DUE TO LIPID RICH PLAQUE: ICD-10-CM

## 2025-06-03 DIAGNOSIS — E78.2 MIXED HYPERLIPIDEMIA: ICD-10-CM

## 2025-06-03 PROCEDURE — 99212 OFFICE O/P EST SF 10 MIN: CPT | Performed by: NURSE PRACTITIONER

## 2025-06-03 PROCEDURE — 99214 OFFICE O/P EST MOD 30 MIN: CPT | Performed by: NURSE PRACTITIONER

## 2025-06-03 PROCEDURE — 1159F MED LIST DOCD IN RCRD: CPT | Performed by: NURSE PRACTITIONER

## 2025-06-03 PROCEDURE — 1036F TOBACCO NON-USER: CPT | Performed by: NURSE PRACTITIONER

## 2025-06-03 PROCEDURE — 3074F SYST BP LT 130 MM HG: CPT | Performed by: NURSE PRACTITIONER

## 2025-06-03 PROCEDURE — 3078F DIAST BP <80 MM HG: CPT | Performed by: NURSE PRACTITIONER

## 2025-06-03 PROCEDURE — 1160F RVW MEDS BY RX/DR IN RCRD: CPT | Performed by: NURSE PRACTITIONER

## 2025-06-03 NOTE — PROGRESS NOTES
Subjective   Cristy Forte is a 79 y.o. male.    Chief Complaint:  Hypertension, Hyperlipidemia, and Coronary Artery Disease    Mr. Forte returns for a routine 4 month follow up. He is seen in collaboration with Dr. Giraldo. He has been feeling well from a cardiac standpoint. He has remained compliant with his medications, denying any intolerances. He has some shortness of breath when walking up stairs, though this is not new or getting any worse. He denies any exertional chest pain. He denies any recent ER visits or hospitalizations. He offers no new cardiovascular complaints or concerns today. He denies any complaints of chest pain, shortness of breath, lightheadedness, dizziness, palpitations, syncope, orthopnea, paroxysmal nocturnal dyspnea, lower extremity swelling or bleeding concerns.        Review of Systems   All other systems reviewed and are negative.      Objective   Physical Exam  Constitutional:       Appearance: Healthy appearance. In no distress  Pulmonary:      Effort: Pulmonary effort is normal.      Breath sounds: Normal breath sounds.   Cardiovascular:      Normal rate. Regular rhythm. Normal S1. Normal S2.       Murmurs: There is soft systolic murmur.      Carotids: right carotid pulse +2, no bruit heard over the right carotid. left carotid pulse +2, no bruit heard over the left carotid.  Edema:     Peripheral edema absent.   Abdominal:      Palpations: Abdomen is soft.   Musculoskeletal:       Cervical back: Normal range of motion.   Skin:     General: Skin is warm and dry. Normal color and pigmentation   Neurological:      Mental Status: Alert and oriented to person, place and time.   Psychiatric:     Mood and Affect: appropriate mood and appropriate affect.       Lab Review:   Lab Results   Component Value Date     03/02/2025    K 4.2 03/02/2025     03/02/2025    CO2 29 03/02/2025    BUN 13 03/02/2025    CREATININE 1.03 03/02/2025    GLUCOSE 101 (H) 03/02/2025    CALCIUM  8.9 03/02/2025     Lab Results   Component Value Date    WBC 6.2 03/02/2025    HGB 11.6 (L) 03/02/2025    HCT 38.1 (L) 03/02/2025    MCV 80 03/02/2025     03/02/2025     Lab Results   Component Value Date    CHOL 84 01/01/2025    TRIG 75 01/01/2025    HDL 33.8 01/01/2025       Assessment/Plan   Mr. Forte is a pleasant 79-year-old -American male with a past medical history significant for hypertension, hyperlipidemia, CAD s/p remote LAD stenting with overlapping stents, severe aortic stenosis s/p AVR in 2018, T2DM, atrial fibrillation (presumably the cause of his stroke) on xarelto anticoagulation, provoked multiple pulmonary embolisms, CVA, cervical radiculopathy s/p cervical fusion and lumbar radiculopathy s/p bilateral C7 pedicle screw fractures. Echocardiogram 12/2024 showed an EF of 70%, normal RV systolic function, severe mitral annular calcification and normal bioprosthetic aortic valve structure and function. He presents today for routine follow up stable from a cardiac standpoint. His VS remain stable. He remains on appropriate antiplatelet and lipid lowering therapy with his LDL at goal. He seems to be getting around reasonably well, denying any exertional intolerances. I will have him continue all medications unchanged. He will follow up with us in clinic in 6 months with an echo the same day. He knows to call for any concerns.

## 2025-06-08 DIAGNOSIS — I10 PRIMARY HYPERTENSION: ICD-10-CM

## 2025-06-10 ENCOUNTER — CLINICAL SUPPORT (OUTPATIENT)
Dept: PHYSICAL THERAPY | Facility: CLINIC | Age: 80
End: 2025-06-10
Payer: MEDICARE

## 2025-06-10 VITALS — DIASTOLIC BLOOD PRESSURE: 76 MMHG | SYSTOLIC BLOOD PRESSURE: 132 MMHG | HEART RATE: 76 BPM

## 2025-06-10 DIAGNOSIS — R42 EPISODE OF DIZZINESS: ICD-10-CM

## 2025-06-10 DIAGNOSIS — G20.C PARKINSONISM, UNSPECIFIED PARKINSONISM TYPE (MULTI): ICD-10-CM

## 2025-06-10 DIAGNOSIS — R26.89 IMBALANCE: Primary | ICD-10-CM

## 2025-06-10 PROCEDURE — 97110 THERAPEUTIC EXERCISES: CPT | Mod: GP | Performed by: PHYSICAL THERAPIST

## 2025-06-10 PROCEDURE — 97161 PT EVAL LOW COMPLEX 20 MIN: CPT | Mod: GP | Performed by: PHYSICAL THERAPIST

## 2025-06-10 PROCEDURE — 97112 NEUROMUSCULAR REEDUCATION: CPT | Mod: GP | Performed by: PHYSICAL THERAPIST

## 2025-06-10 RX ORDER — CHLORTHALIDONE 25 MG/1
25 TABLET ORAL DAILY
Qty: 90 TABLET | Refills: 0 | Status: SHIPPED | OUTPATIENT
Start: 2025-06-10

## 2025-06-10 ASSESSMENT — PAIN - FUNCTIONAL ASSESSMENT: PAIN_FUNCTIONAL_ASSESSMENT: WONG-BAKER FACES

## 2025-06-10 ASSESSMENT — ENCOUNTER SYMPTOMS
DEPRESSION: 0
OCCASIONAL FEELINGS OF UNSTEADINESS: 1
LOSS OF SENSATION IN FEET: 1

## 2025-06-10 ASSESSMENT — PAIN SCALES - WONG BAKER: WONGBAKER_NUMERICALRESPONSE: HURTS LITTLE BIT

## 2025-06-10 NOTE — PROGRESS NOTES
Physical Therapy    Physical Therapy Evaluation and Treatment      Patient Name: Cristy Forte  MRN: [id number]  Today's Date: 6/10/25  Medicare  Medicare POC end date: 9/8/25  Time Entry:   Time Calculation  Start Time: 1105  Stop Time: 1205  Time Calculation (min): 60 min  PT Evaluation Time Entry  PT Evaluation (Low) Time Entry: 30  PT Therapeutic Procedures Time Entry  Therapeutic Exercise Time Entry: 15  Neuromuscular Re-Education Time Entry: 15        Assessment:  Patient is a 79 year old male referred to [facility]'s outpatient physical therapy services with a chief complaint of imbalance and unsteadiness on his feet. The patient was recently evaluated by neurology and has a new diagnosis of Parkinsonism.  The patient also has a history of multiple acute/ subacute infarcts in the area of the putamen and external capsule, left thalamus, left periatrial white matter, cortex and juxtacortical white matter of both cerebral hemispheres, right cerebral peduncle, paramedian right dotty, and cerebellar vermis.  The patient is known to our facility and has been seen in physical therapy in the past.  Mr. Forte has made progress in physical therapy in the past but demonstrates decreased home program compliance.  The patient has a history of peripheral neuropathy which effects his balance.  The patient is presenting with a functional decline in his strength and required bilateral upper extremity support during the 5x sit to stand test, whereas last year on 5/23/24 the patient was able to perform the test without upper extremities.  The patient presented with difficulty with eyes closed conditions on foam and firm surfaces today indicating reduced utilization of sensory and vestibular cues for balance.  Mr. Forte does not currently have an exercise routine and is generally sedentary. Pt. Will highly benefit from physical therapy services to establish HEP and reduce fall risk in his home.      Plan: ALEYDA,  establish HEP, 10MWT, 6MWT  OP PT Plan  Treatment/Interventions: Canalith repositioning, Cryotherapy, Gait training, Manual therapy, Neuromuscular re-education, Self care/ home management, Therapeutic activities, Therapeutic exercises, Taping techniques, Hot pack, Education/ Instruction  PT Plan: Skilled PT  PT Frequency: 1 time per week  Duration: 10 visits  Onset Date: 04/10/25  Certification Period Start Date: 06/10/25  Certification Period End Date: 09/08/25  Rehab Potential: Fair    Current Problem:   1. Imbalance  Follow Up In Physical Therapy      2. Parkinsonism, unspecified Parkinsonism type (Multi)  Referral to Physical Therapy    Follow Up In Physical Therapy      3. Episode of dizziness  Follow Up In Physical Therapy          Subjective    Patient Report: PT. Reports that she started carbidopa/levodopa and went up to 2 whoel pills and he woke up feeling shaking and had had chills.  They dont think it was from the medication.  He said to go back to 1.5mg and he is now just taking 1 mg.  He doesn't feel like it is helping with his balance. He has not done his exercises too much.  He hasn't seen his girlfriend too much because she has dementia.  He doesn't get lightheaded when he gets out of bed.  His back bothers he stands/walks too long    CLOF: pt. Doesn't feel steady, he feels like he is weaving back and forth, if he bends over and gets back up he feels lightheaded and takes a while for his eyes to focus.  He feels foggy in his head.  Someone helps him in the lawn, he is afraid to do yard work. He cooks/cleans    Home Setup:  Pt. Lives alone, a couple stairs to get into his home with one railing, one level home, has family in town  2 dtrs and grandchildren, has a basement with washer/drying and get things out of freezer  Equipment:  no devices, has a walk in shower with grab bars  Hobbies: used to work out in his yard but now has someone who helps him  Physical Activity: he almost started last week, has  "a treadmill but doesn't use it.  Occupation: retired,    Sleep: 6-7 hours a night  Hydration: drinks 3-12 ounce glasses  Falls: no falls   Pt. Goal: \"strengthen legs, better balance\"     Precautions: aortic valve replacement in 2017/18, pror laminectomy with posterior fixation at multiple sites, A-Fib, DMII, prior DVT     Vital Signs: 132/76 sitting BP electronic cuff, HR-76 BPM     Pain: faces pain scale, 2/10 in bilateral feet chronic neuropathic pain          Objective     Posture=severely rounded head/forward shoulders  Tremor=neg.  Coordination=finger opposition impaired due to decreased ROM in hands, DF/PF WNL  Vision=wears reading glasses  Hearing=WNL  Sensation=peripheral neuropathy  Edema=swelling in feet and hands  Gait=wide  base of support, veering of path noted, head en block posture with no device    Lower Extremity Strength MMT (tested in seated)    Left Right   Hip flexion 4+/5 4+/5   Hip ADD  4+/5 4+/5   Hip ABD 4+/5 4+/5   Knee flexion 4+/5 4+/5   Knee Extension 4/5 4/5   Ankle Dorsiflexion 4/5 4/5   Ankle Plantarflexion 4/5 4/5   Ankle Inversion 4/5 4/5   Ankle Eversion 4/5 4/5      Modified Clinical Sensory Integration Test  Condition One: Eyes Open, Firm Surface  Total Time: __30_____ / 30 sec minimal sway   Condition Two: Eyes Closed, Firm Surface  Total Time: __10, 18, 30__(19 sec average)___ / 30 sec mod sway  Condition Three: Eyes Open, Foam Surface  Total Time: __30_____ / 30 sec mild sway  Condition Four: Eyes Closed, Foam Surface  Total Time: __3,3,2 (av.=2.7 sec. Average.)_____ / 30 sec LOB to the L side with min-A required      Other Measures  5x Sit to Stand:  6/10/25=16 seconds with BUE support  5/23/24=17 seconds with no UE support     Other Outcome Measures:   6/10/25=SLS: RLE= 1 seonds, LLE= 1 seconds  4/19/24=SLS: RLE= 2 seonds, LLE= 2 seconds     ABC=51%  810/1600    *due to time constraints further functional outcome measures will be performed in next-1 2 visits   " "    Treatments:    There Ex:  -educated pt. On completing sit<>stands x5, 2x/day  -educated pt. On importance of completing cardiovascular exercise regularly for CVA prevention, discussed that pt. Is at risk for future strokes due to history of multiple strokes and encoaurged pt. That cardiovascular exercise is a good way to help prevent future events    Neuro Re-Ed:  -discussed differences between Parkinsonism vs. Parkinson Disease, discussed importance of exercise in treating movement disorders to slow progression  Skilled vestibular specific education was provided to patient on three balance systems including vestibular system, visual system and balance systems and how an impairment of one or more of these systems can cause dizziness or imbalance.  Discussed how vestibular based therapy treatment can address these deficits.     EDUCATION:  -see treatment       Goals:  Active       PT Problem       PT Goal 1: Pt. Will improve EC on firm condition of MCTSIB to indicate improved utilization of proprioceptive cues for balance by end of POC.        Start:  06/13/25    Expected End:  09/08/25            PT Goal 2: Pt. will be able to complete 5x sit<>stand test without upper extremity support by end of POC to indicate improved lower extremity strength.       Start:  06/13/25    Expected End:  09/08/25            PT Goal 3: Pt. will complete FGA in next 1-2 visits       Start:  06/13/25    Expected End:  09/08/25            PT Goal 4: Pt. will complete 6MWT in next 1-2 visits       Start:  06/13/25    Expected End:  09/08/25            PT Goal 5: Pt. Will be independent in HEP in next 1-2 visits to promote self efficacy, manage symptoms and meet other LTG.        Start:  06/13/25    Expected End:  09/08/25            Patient Stated Goal 1       Start:  06/13/25    Expected End:  09/08/25       Pt. Goal: \"strengthen legs, better balance\"                   "

## 2025-06-12 ENCOUNTER — HOSPITAL ENCOUNTER (OUTPATIENT)
Dept: RADIOLOGY | Facility: HOSPITAL | Age: 80
Discharge: HOME | End: 2025-06-12
Payer: MEDICARE

## 2025-06-12 DIAGNOSIS — R31.0 GROSS HEMATURIA: ICD-10-CM

## 2025-06-12 PROCEDURE — 2500000004 HC RX 250 GENERAL PHARMACY W/ HCPCS (ALT 636 FOR OP/ED)

## 2025-06-12 PROCEDURE — A9575 INJ GADOTERATE MEGLUMI 0.1ML: HCPCS | Performed by: NURSE PRACTITIONER

## 2025-06-12 PROCEDURE — 2550000001 HC RX 255 CONTRASTS: Performed by: NURSE PRACTITIONER

## 2025-06-12 PROCEDURE — 72197 MRI PELVIS W/O & W/DYE: CPT | Performed by: RADIOLOGY

## 2025-06-12 PROCEDURE — 72197 MRI PELVIS W/O & W/DYE: CPT

## 2025-06-12 RX ORDER — GADOTERATE MEGLUMINE 376.9 MG/ML
20 INJECTION INTRAVENOUS
Status: COMPLETED | OUTPATIENT
Start: 2025-06-12 | End: 2025-06-12

## 2025-06-12 RX ORDER — FUROSEMIDE 10 MG/ML
20 INJECTION INTRAMUSCULAR; INTRAVENOUS ONCE
OUTPATIENT
Start: 2025-06-12 | End: 2025-06-12

## 2025-06-12 RX ORDER — FUROSEMIDE 10 MG/ML
20 INJECTION INTRAMUSCULAR; INTRAVENOUS ONCE
Status: COMPLETED | OUTPATIENT
Start: 2025-06-12 | End: 2025-06-12

## 2025-06-12 RX ADMIN — GADOTERATE MEGLUMINE 20 ML: 376.9 INJECTION INTRAVENOUS at 13:57

## 2025-06-12 RX ADMIN — FUROSEMIDE 20 MG: 10 INJECTION, SOLUTION INTRAMUSCULAR; INTRAVENOUS at 13:57

## 2025-06-20 ENCOUNTER — OFFICE VISIT (OUTPATIENT)
Dept: NEUROLOGY | Facility: HOSPITAL | Age: 80
End: 2025-06-20
Payer: MEDICARE

## 2025-06-20 VITALS
HEIGHT: 71 IN | BODY MASS INDEX: 32.41 KG/M2 | SYSTOLIC BLOOD PRESSURE: 144 MMHG | RESPIRATION RATE: 17 BRPM | WEIGHT: 231.48 LBS | HEART RATE: 85 BPM | DIASTOLIC BLOOD PRESSURE: 81 MMHG

## 2025-06-20 DIAGNOSIS — I63.9 ACUTE CVA (CEREBROVASCULAR ACCIDENT) (MULTI): ICD-10-CM

## 2025-06-20 DIAGNOSIS — G20.C PARKINSONISM, UNSPECIFIED PARKINSONISM TYPE (MULTI): Primary | ICD-10-CM

## 2025-06-20 PROCEDURE — 3079F DIAST BP 80-89 MM HG: CPT | Performed by: PSYCHIATRY & NEUROLOGY

## 2025-06-20 PROCEDURE — 3077F SYST BP >= 140 MM HG: CPT | Performed by: PSYCHIATRY & NEUROLOGY

## 2025-06-20 PROCEDURE — G2211 COMPLEX E/M VISIT ADD ON: HCPCS | Performed by: PSYCHIATRY & NEUROLOGY

## 2025-06-20 PROCEDURE — 1126F AMNT PAIN NOTED NONE PRSNT: CPT | Performed by: PSYCHIATRY & NEUROLOGY

## 2025-06-20 PROCEDURE — 99214 OFFICE O/P EST MOD 30 MIN: CPT | Mod: GC | Performed by: PSYCHIATRY & NEUROLOGY

## 2025-06-20 PROCEDURE — 99214 OFFICE O/P EST MOD 30 MIN: CPT | Performed by: PSYCHIATRY & NEUROLOGY

## 2025-06-20 PROCEDURE — 1036F TOBACCO NON-USER: CPT | Performed by: PSYCHIATRY & NEUROLOGY

## 2025-06-20 PROCEDURE — 1159F MED LIST DOCD IN RCRD: CPT | Performed by: PSYCHIATRY & NEUROLOGY

## 2025-06-20 ASSESSMENT — UNIFIED PARKINSONS DISEASE RATING SCALE (UPDRS)
CONSTANCY_TREMOR_ATREST: 0
RIGIDITY_RLE: 0
SPONTANEITY_OF_MOVEMENT: 2
FINGER_TAPPING_LEFT: 2
KINETIC_TREMOR_LEFTHAND: 0
AMPLITUDE_RUE: 0
RIGIDITY_RUE: 0
POSTURAL_STABILITY: 0
PRONATION_SUPINATION_LEFT: 2
LEG_AGILITY_RIGHT: 1
AMPLITUDE_RLE: 0
AMPLITUDE_LIP_JAW: 0
AMPLITUDE_LLE: 0
LEVODOPA: YES
RIGIDITY_NECK: 2
POSTURAL_TREMOR_RIGHTHAND: 0
RIGIDITY_LLE: 0
TOETAPPING_LEFT: 1
AMPLITUDE_LUE: 0
POSTURAL_TREMOR_LEFTHAND: 0
CHAIR_RISING_SCALE: 2
CLINICAL_STATE: ON
TOTAL_SCORE: 29
FREEZING_GAIT: 1
PRONATION_SUPINATION_RIGHT: 2
LEG_AGILITY_LEFT: 1
FACIAL_EXPRESSION: 2
KINETIC_TREMOR_RIGHTHAND: 0
RIGIDITY_LUE: 1
FINGER_TAPPING_RIGHT: 2
POSTURE: 1
TOETAPPING_RIGHT: 1
DYSKINESIAS_PRESENT: NO
SPEECH: 1
HANDMOVEMENTS_RIGHT: 2
GAIT: 1
PARKINSONS_MEDS: YES

## 2025-06-20 ASSESSMENT — PAIN SCALES - GENERAL: PAINLEVEL_OUTOF10: 0-NO PAIN

## 2025-06-20 NOTE — PROGRESS NOTES
Subjective     Interval Hx:  Patient with PMHx of HTN, HLD, diabetes and b/l embolic stroke shower in 12/2024, last seen 4/10/25 when he was noted to be parkinsonian and stated on Sinemet titration with goal dose 2 tab TID and referred to PT.    Patient reports he had an episode of whole body shakes and feeling cold after he increased C/L from 1.5 tab TID to 2 tabs TID. It was recommended he go down to 1.5 tab but he had difficulties cutting the pills so he went back to 1 tab TID.    Reports not noticing any benefit from current dose of C/L. Reports symptoms have not progressed since last visit.   Stiffness: in torso, hands and ankles  Balance: Continue to be poor. Only had 1 session for PT so far but has scheduled to continues. No fall  Sleep: Disturbed by pain in R hand when he lays on it (chronic unchanged for several years)  Constipation: Easily managed with diet  Memory: Poor but report unchanged  Living situation: Lives alone, independent of all ADLs  Driving: Yes no issue  [place]: a little lightheaded when he bends over to pick something up, no dizziness when sitting up or standing.    Still on ASA and plavix for secondary stroke prevention. Reports Bps at home around 138.    Prior Hx  Mr. Forte is a pleasant 79-year-old right-handed with 15 years of formal education and PMHx of HTN, HLD,Diabetes who is presenting today for post hospitalization follow-up. I saw him in 12/2024 when he had embolic stroke, MRI brain wo contrast on 12/30/24 showed multiple scattered small foci of abnormally restricted diffusion consistent with acute to subacute infarctions including left external capsule, right putamen and external capsule, left thalamus, left periatrial white matter, cortex and juxtacortical white matter of both cerebral hemispheres, right cerebral peduncle, paramedian right dotty, and cerebellar vermis. There is white matter hyperintense lesions consistent with microangiopathy, no acute intracranial hemorrhage.  "No extra-axial fluid collection. No intracranial mass. Major vascular flow voids intact. He is already taking Xarelto, ASA 81 mg and statin.     Mood is \"good.\". has not noticed any significant depressive symptoms. Gets frustrated with memory problems.   Cristy worries about health and future options for self.     Has good appetite - eats healthy.     No change in personality, social disinhibition, change in dietary preferences, loss of empathy, stereotyped or repetitive behaviors.   Peripheral visual hallucinations. No fluctuating cognition, tremors, REM sleep behavior disorder, fall.    Functional changes:   Born and raised in [place], [place]  Sleep:   Current living situation - lives in [place] alone, daughter lives nearby .   Driving - Independently.   Finances - Independent.   Cooking - slelf cooks.   ADLS - independent.   Medications - Takes own medication;  uses pillbox.   Weapons at home: Yes, locked away  Knows 911? Yes, emergencies    Neuropsychiatric symptoms:   Depression - no depression. Appetite ok. Sleeping fine. No worthlessness/helplessness. No suicidal thoughts, intent or plans.   Anxiety - yes, especially with traffic and waiting long periods of time.   Psychosis - Denies.   [place] - Denies.   Suicidality - Denies.     Prior Work-up:   Normal TSH,  No Vit B12.   Neuropsychological testing    Past Neuropsychiatric History:  Handedness: right.   History of traumatic brain injury: None.   History of seizures: None  History of stroke: None.   Past psychiatric history: None    PMH/PSH:  As above, in addition    Meds: reviewed as listed    Allergies: reviewed as listed    Family history:   Psychiatric: None.   Dementia: None   Parkinsons disease: None  Huntingtons disease: None  ALS: None    Social History:   Quit Tobacco use quit 35yrs ago, No alcohol quit 5yrs ago, No recreational drugs Quit 62, Marijuana.   twice, 1st , 2nd divorce . Has 2  children   Worked as [employer] as " "a - retired.    Mental Status Examination:  General appearance: Well-groomed, good eye contact, cooperative  Orientation: Alert and oriented to person, place, and time  Motor: no psychomotor agitation or retardation, stable gait  Speech: regular rate, rhythm, tone, and volume  Mood: \"good\"  Affect: stable, full range, with brightening, and mood congruent  Passive death wish: denies  Suicidal ideation: denies  Thought process: logical, linear, and goal-directed without loosening of associations  Thought content: no hallucinations, delusions, and not responding to internal stimuli  Insight/Judgment: good/good  Praxis: Transitive/Intransitive/Orofacial  Fund of knowledge: good  Recent and remote memory: good  Attention span and concentration: good  Language: normal receptive and expressive language without paraphrasic errors    Objective     Visit Vitals  /81 (BP Location: Left arm, Patient Position: Sitting, BP Cuff Size: Adult)   Pulse 85   Resp 17   Ht 1.791 m (5' 10.5\")   Wt 105 kg (231 lb 7.7 oz)   BMI 32.74 kg/m²   Smoking Status Former   BSA 2.29 m²     Neurological Exam  Physical Exam    Awake alert  EOMI  Face symmetrical (Hypomimia)     Motor:  Muscle bulk was normal in all extremities.  5/5 strength in all extremities  Slow finger taps and hand opening  Increased tone in BLE  No abnormal movements    Reflexes:   Right UE     LEFT UE  BR: 2          BR: 2  Biceps: 2    Biceps: 2  Triceps: 2   Triceps: 2    RIGHT LE     LEFT LE  Knee: 2        Knee: 2  Ankle: 1       Ankle: 1    Negative Eber's reflex  No frontal release signs    Coordination:  Normal finger to nose testing and rapid alternating movements    Gait:  Good bilateral arm swing, hunched posture, mild en-bloc turn    Sensory:  Negative Romberg's sign     MDS UPDRS 1st Score: Motor Examination  Is the patient on medication for treating the symptoms of Parkinson's Disease?: Yes  Patients receiving medication for treating the symptoms " of Parkinson's Disease, mariam the patient's clinical state.: On  Is the patient on Levodopa?: Yes  Minutes since last Levodopa dose: 3.5 hr  Speech: 1  Facial Expression: 2  Rigidty Neck: 2  Rigidty RUE: 0  Rigidity - LUE: 1  Rigidity RLE: 0  Rigidity LLE: 0  Finger Tapping Right Hand: 2  Finger Tapping Left Hand: 2  Hand Movements- Right Hand: 2  Hand Movements- Left Hand: 2  Pronatiaon-Supination Movments - Right Hand: 2  Pronatiaon-Supination Movments Left Hand: 2  Toe Tapping Right Foot: 1  Toe Tapping - Left Foot: 1  Leg Agility - Right Le  Leg Agility - Left le  Arising from Chair: 2  Gait: 1  Freezing of Gait: 1  Postural Stability: 0  Posture: 1  Global Spontanteity of Movment ( Body Bradykinesia): 2  Postural Tremor - Right Hand: 0  Postural Tremor - Left hand: 0  Kinetic Tremor - Right hand: 0  Kinetic Tremor - Left hand: 0  Rest Tremor Amplitude - RUE: 0  Rest Tremor Amplitude - LUE: 0  Rest Tremor Amplitude - RLE: 0  Rest Tremor Amplitude - LLE: 0  Rest Tremor Amplitude - Lip/Jaw: 0  Constancy of Rest Tremor: 0  MDS UPDRS Total Score: 29  Were dyskinesias (chorea or dystonia) present during examination?: No   Extensive review of notes in EMR, labs, tests   HDL-Cholesterol   Date Value Ref Range Status   2025 33.8 mg/dL Final     Comment:       Age       Very Low   Low     Normal    High    0-19 Y    < 35      < 40     40-45     ----  20-24 Y    ----     < 40      >45      ----        >24 Y      ----     < 40     40-60      >60       Cholesterol/HDL Ratio   Date Value Ref Range Status   2025 2.5  Final     Comment:       Ref Values  Desirable  < 3.4  High Risk  > 5.0     VLDL   Date Value Ref Range Status   2025 15 0 - 40 mg/dL Final     Triglycerides   Date Value Ref Range Status   2025 75 0 - 149 mg/dL Final     Comment:     Age              Desirable        Borderline         High        Very High  SEX:B           mg/dL             mg/dL               mg/dL       mg/dL  <=14D                       ----               ----        ----  15D-365D                    ----               ----        ----  1Y-9Y           0-74               75-99             >=100       ----  10Y-19Y        0-89                            >=130       ----  20Y-24Y        0-114             115-149             >=150      ----  >= 25Y         0-149             150-199             200-499    >=500      Venipuncture immediately after or during the administration of Metamizole may lead to falsely low results. Testing should be performed immediately prior to Metamizole dosing.     Non HDL Cholesterol   Date Value Ref Range Status   01/01/2025 50 0 - 149 mg/dL Final     Comment:           Age       Desirable   Borderline High   High     Very High     0-19 Y     0 - 119       120 - 144     >/= 145    >/= 160    20-24 Y     0 - 149       150 - 189     >/= 190      ----         >24 Y    30 mg/dL above LDL Cholesterol goal       Hemoglobin A1C   Date Value Ref Range Status   12/30/2024 6.2 (H) See comment % Final     Estimated Average Glucose   Date Value Ref Range Status   12/30/2024 131 Not Established mg/dL Final     Creatinine   Date Value Ref Range Status   03/02/2025 1.03 0.50 - 1.30 mg/dL Final     eGFR   Date Value Ref Range Status   03/02/2025 75 >60 mL/min/1.73m*2 Final     Comment:     Calculations of estimated GFR are performed using the 2021 CKD-EPI Study Refit equation without the race variable for the IDMS-Traceable creatinine methods.  https://jasn.asnjournals.org/content/early/2021/09/22/ASN.5374709215     Protime   Date Value Ref Range Status   03/02/2025 30.4 (H) 9.8 - 12.4 seconds Final     INR   Date Value Ref Range Status   03/02/2025 2.7 (H) 0.9 - 1.1 Final     Results for orders placed during the hospital encounter of 12/30/24    Transthoracic Echo (TTE) Limited    [facility], [address] [place] [address]  Tel [phone] and Fax  "[phone]    TRANSTHORACIC ECHOCARDIOGRAM REPORT      Patient Name:       CRISTY FAN   Reading Physician:    [id number] Stephen Pulido MD  Study Date:         12/30/2024          Ordering Provider:    [id number] JAIME SILVERMAN  MRN/PID:            [id number]            Fellow:  Accession#:         [id number]        Nurse:  Date of Birth/Age:  1945 / 79     Sonographer:          Hermila Mac RDCS  years  Gender assigned at  M                   Additional Staff:  Birth:  Height:             177.80 cm           Admit Date:           12/30/2024  Weight:             103.87 kg           Admission Status:     Inpatient -  Priority  discharge  BSA / BMI:          2.21 m2 / 32.86     Encounter#:           [id number]  kg/m2  Blood Pressure:     137/65 mmHg         Department Location:  [facility] Non  Invasive    Study Type:    TRANSTHORACIC ECHO (TTE) LIMITED  Diagnosis/ICD: Cerebrovascular disease, unspecified-I67.9  Indication:    Transischemic Attack  CPT Code:      Echo Limited-05889    Patient History:  Diabetes:          Yes  Pertinent History: A-Fib, CAD, CVA, PE, HTN and Hyperlipidemia. S/P AVR 25mm  freestyle stentless AV bioprosthesis.    Study Detail: The following Echo studies were performed: 2D, M-Mode, Doppler and  color flow. Agitated saline used as a contrast agent for  intraseptal flow evaluation       ROS: All systems reviewed, pertinent positives noted in HPI      Provider impressions:  Cristy Fan  is a pleasant 79 y.o. right-handed with 15 years of formal education and PMHx of HTN, HLD, and bilateral hemisphere and brainstem embolic showers noted on MRI on 12/30/25 with no notable residual deficit on Xarelto and ASA. Last seen 4/2025 when he was noted to have symmetrical parkinsonism. Sinemet titration to 2 tab TID was ordered and he was referred for PT. Today reports had an episode of \"shivers\" when he got to 2 tab TID and went back to 1 tab TID where he has remained. Denies noticing any " change in his stiffness or gait at this dose. Patient agreeable to up titrate Sinemet back to 2 tab TID to assess if any benefit. Will perform MoCA at next visit.    The following was discussed:  - The shivering episode you experienced was likely unrelated to the carbidopa/levodopa (Sinemet)  - We want you to give this medication a good try as follows  #Increase the dose of Sinemet to 1.5 tablet 3 times a day then after 1 week increase to 2 tablets three times a day. If you develop any side effect like (nausea, constipation) when at 2 tablets 3 times a day then go back down to 1.5 three times a day and message or call to let us know  - Continue the good work with physical therapy.  - We will do a memory test call ed MoCA at your next visit   - Come back to see us in three months for follow up.

## 2025-06-20 NOTE — PATIENT INSTRUCTIONS
Pleasure meeting you today. We discussed the following:  - The shivering episode you experienced was likely unrelated to the carbidopa/levodopa (Sinemet)  - We want you to give this medication a good try as follows  #Increase the dose of Sinemet to 1.5 tablet 3 times a day then after 1 week increase to 2 tablets three times a day. If you develop any side effect like (nausea, constipation) when at 2 tablets 3 times a day then go back down to 1.5 three times a day and message or call to let us know  - Continue the good work with physical therapy.  - We will do a memory test call ed MoCA at your next visit   - Come back to see us in three months for follow up.

## 2025-06-27 ENCOUNTER — APPOINTMENT (OUTPATIENT)
Dept: PHYSICAL THERAPY | Facility: CLINIC | Age: 80
End: 2025-06-27
Payer: MEDICARE

## 2025-06-30 DIAGNOSIS — G20.C PARKINSONISM, UNSPECIFIED PARKINSONISM TYPE (MULTI): ICD-10-CM

## 2025-06-30 RX ORDER — CARBIDOPA AND LEVODOPA 25; 100 MG/1; MG/1
TABLET ORAL
Qty: 540 TABLET | Refills: 3 | Status: SHIPPED | OUTPATIENT
Start: 2025-06-30

## 2025-06-30 RX ORDER — CARBIDOPA AND LEVODOPA 25; 100 MG/1; MG/1
TABLET ORAL
Qty: 90 TABLET | Refills: 2 | Status: CANCELLED | OUTPATIENT
Start: 2025-06-30

## 2025-07-11 ENCOUNTER — TREATMENT (OUTPATIENT)
Dept: PHYSICAL THERAPY | Facility: CLINIC | Age: 80
End: 2025-07-11
Payer: MEDICARE

## 2025-07-11 DIAGNOSIS — R26.89 IMBALANCE: Primary | ICD-10-CM

## 2025-07-11 DIAGNOSIS — R42 EPISODE OF DIZZINESS: ICD-10-CM

## 2025-07-11 DIAGNOSIS — G20.C PARKINSONISM, UNSPECIFIED PARKINSONISM TYPE (MULTI): ICD-10-CM

## 2025-07-11 PROCEDURE — 97112 NEUROMUSCULAR REEDUCATION: CPT | Mod: GP | Performed by: PHYSICAL THERAPIST

## 2025-07-11 PROCEDURE — 97110 THERAPEUTIC EXERCISES: CPT | Mod: GP | Performed by: PHYSICAL THERAPIST

## 2025-07-11 NOTE — PROGRESS NOTES
"Physical Therapy    Physical Therapy Treatment    Patient Name: Cristy Forte  MRN: [id number]  Today's Date: 7/11/2025  Medicare  Visit number=2   Primary dx=imbalance  Time Entry:   Time Calculation  Start Time: 0951  Stop Time: 1030  Time Calculation (min): 39 min     PT Therapeutic Procedures Time Entry  Therapeutic Exercise Time Entry: 10  Neuromuscular Re-Education Time Entry: 29      Assessment:  The FGA was completed today, pt. Scored 17/30 which is below aged matched normative value of 24/30 and is considered to be in a fall risk category compared to fall risk cut off score of 23/30.  Pt. Presents with chronic lightheadedness, which I suspect is cervicogenic in nature. However pt. Is limited in cervical stretches and mobilizations are contraindicated due to pt. With prior cervical spinal fusion.  Time was spent reviewing HEP consisting of large amplitude based exercises to work in increasing postural strength, dynamic balance, anterior weight shift.      Plan: review HEP, 6MWT       Current Problem  1. Imbalance  Follow Up In Physical Therapy      2. Parkinsonism, unspecified Parkinsonism type (Multi)  Follow Up In Physical Therapy      3. Episode of dizziness  Follow Up In Physical Therapy                Subjective    Pt. Reports that he was sick last week but is feeling better now.     Precautions:  mild fall risk     Vital Signs: not taken      Pain: arthritic pain in his fingers     Objective        Outcome Measures:  FGA - Functional Gait Assessment  Gait level surface: 2  Change in gait speed: 2  Gait with horizontal head turns: 2  Gait with vertical head turns: 2  Gait and pivot turn: 2  Step over obstacle: 2  Gait with narrow base of support: 0  Gait with eyes closed: 1  Ambulating backwards: 2  Steps: 2  FGA Total Score: 17    Treatments:    There Ex:  Recumbent bike with BUE's/BLE's at L3 for 10'x1 with VC to keep RPM's above 100  6MWT with no device  Upright posture x10 with 5\" hold    Neuro " "Re-Ed:  The following exercises were completed at supervision level over 20ft. distance  Gait level surface:  Change in gait speed:  Gait with horizontal head turns:  Gait with vertical head turns:  Gait with pivot turn:  Step over obstacle:  Gait with narrow RENE:  Gait with eyes closed:  Ambulating backwards:  Stairs 3-6 inch stairs   Educated pt. On fall risk cut off score.   -PWR standing with PT demo   [id number]   WS x10 alt. sides   Twist x10 alt. sides   Transition x10 alt. sides    OP EDUCATION:  -upright posture 5\" holds  -standing PWR!       Goals:  Active       PT Problem       PT Goal 1: Pt. Will improve EC on firm condition of MCTSIB to indicate improved utilization of proprioceptive cues for balance by end of POC.        Start:  06/13/25    Expected End:  09/08/25            PT Goal 2: Pt. will be able to complete 5x sit<>stand test without upper extremity support by end of POC to indicate improved lower extremity strength.       Start:  06/13/25    Expected End:  09/08/25            PT Goal 3: Pt. Will improve FGA by 4 points in order to meet MCID for significant change compared to normative values and indicate improved dynamic balance.        Start:  06/13/25    Expected End:  09/08/25            PT Goal 4: Pt. will complete 6MWT in next 1-2 visits       Start:  06/13/25    Expected End:  09/08/25            PT Goal 5: Pt. Will be independent in HEP in next 1-2 visits to promote self efficacy, manage symptoms and meet other LTG.        Start:  06/13/25    Expected End:  09/08/25            Patient Stated Goal 1       Start:  06/13/25    Expected End:  09/08/25       Pt. Goal: \"strengthen legs, better balance\"           "

## 2025-07-18 ENCOUNTER — TREATMENT (OUTPATIENT)
Dept: PHYSICAL THERAPY | Facility: CLINIC | Age: 80
End: 2025-07-18
Payer: MEDICARE

## 2025-07-18 DIAGNOSIS — R26.89 IMBALANCE: Primary | ICD-10-CM

## 2025-07-18 DIAGNOSIS — G20.C PARKINSONISM, UNSPECIFIED PARKINSONISM TYPE (MULTI): ICD-10-CM

## 2025-07-18 DIAGNOSIS — R42 EPISODE OF DIZZINESS: ICD-10-CM

## 2025-07-18 PROCEDURE — 97110 THERAPEUTIC EXERCISES: CPT | Mod: GP | Performed by: PHYSICAL THERAPIST

## 2025-07-18 ASSESSMENT — PAIN SCALES - GENERAL: PAINLEVEL_OUTOF10: 10 - WORST POSSIBLE PAIN

## 2025-07-18 ASSESSMENT — PAIN - FUNCTIONAL ASSESSMENT: PAIN_FUNCTIONAL_ASSESSMENT: 0-10

## 2025-07-18 ASSESSMENT — PAIN DESCRIPTION - DESCRIPTORS: DESCRIPTORS: ACHING

## 2025-07-18 NOTE — PROGRESS NOTES
"-Physical Therapy    Physical Therapy Treatment    Patient Name: Cristy Forte  MRN: [id number]  Today's Date: 7/18/2025  Medicare  Visit number=3   Primary dx=imbalance  Time Entry:   Time Calculation  Start Time: 1030  Stop Time: 1115  Time Calculation (min): 45 min     PT Therapeutic Procedures Time Entry  Therapeutic Exercise Time Entry: 45      Assessment:  Pt. Arrived today with stiffness in both of his knees in addition to low back pain and sciatica down to his knee on the right side. We deferred balance exercises in order to address orthopedic issues, reduce pain, prevent worsening of pain/radicular symptoms today and and focused on pain modulation and educating pt. On HEP to self manage these symptoms.  Pt. Reported right sciatic pain reducing from 10/10 to a 5/10 after exercises and reported knees feeling less stiff following HS stretches.       Plan:  review HEP, standing there ex       Current Problem  1. Imbalance  Follow Up In Physical Therapy      2. Parkinsonism, unspecified Parkinsonism type (Multi)  Follow Up In Physical Therapy      3. Episode of dizziness  Follow Up In Physical Therapy                Subjective    Pt. Reports that he is feeling foggy, feels better when he lays down. He is feeling stiff in his knees.   He has sciatic pain on his right side and goes down to his knee on the right side    Precautions:  mild fall risk     Vital Signs: not taken      Pain: 10/10 pain in low back on R side (sciatica)  Pain Assessment  Pain Assessment: 0-10  0-10 (Numeric) Pain Score: 10 - Worst possible pain  Pain Type: Acute pain  Pain Location: Back  Pain Orientation: Right  Pain Radiating Towards: back of knee  Pain Descriptors: Aching  Pain Frequency: Constant/continuous  Objective      Pt. Arrived to visit with no device    Treatments:    There Ex:  -Quad sets x20 each side with 5\" hold  -SKTC 30\"x1 each side  -Bridges x10 with 3\" hold  -LAQ x10 each side with 5\" hold  -seated isometric adduction " "x10 with 5\"hold  -seated hip ABD with band x10 with 5\" hold  -standing HS stretch at stairs 30\"x2 each side    OP EDUCATION:  -upright posture 5\" holds  -standing PWR!  -LAQ x10 each side with 5\" hold  -seated isometric adduction x10 with 5\"hold  -seated hip ABD with band x10 with 5\" hold  -standing HS stretch at stairs 30\"x2 each side  -trunk flexion on diagonal to the left only         Goals:  Active       PT Problem       PT Goal 1: Pt. Will improve EC on firm condition of MCTSIB to indicate improved utilization of proprioceptive cues for balance by end of POC.        Start:  06/13/25    Expected End:  09/08/25            PT Goal 2: Pt. will be able to complete 5x sit<>stand test without upper extremity support by end of POC to indicate improved lower extremity strength.       Start:  06/13/25    Expected End:  09/08/25            PT Goal 3: Pt. Will improve FGA by 4 points in order to meet MCID for significant change compared to normative values and indicate improved dynamic balance.        Start:  06/13/25    Expected End:  09/08/25            PT Goal 4: Pt. will complete 6MWT in next 1-2 visits       Start:  06/13/25    Expected End:  09/08/25            PT Goal 5: Pt. Will be independent in HEP in next 1-2 visits to promote self efficacy, manage symptoms and meet other LTG.        Start:  06/13/25    Expected End:  09/08/25            Patient Stated Goal 1       Start:  06/13/25    Expected End:  09/08/25       Pt. Goal: \"strengthen legs, better balance\"             "

## 2025-07-22 ENCOUNTER — APPOINTMENT (OUTPATIENT)
Dept: PHYSICAL THERAPY | Facility: CLINIC | Age: 80
End: 2025-07-22
Payer: MEDICARE

## 2025-07-25 ENCOUNTER — APPOINTMENT (OUTPATIENT)
Dept: NEUROLOGY | Facility: HOSPITAL | Age: 80
End: 2025-07-25
Payer: MEDICARE

## 2025-08-01 ENCOUNTER — TREATMENT (OUTPATIENT)
Dept: PHYSICAL THERAPY | Facility: CLINIC | Age: 80
End: 2025-08-01
Payer: MEDICARE

## 2025-08-01 DIAGNOSIS — R26.89 IMBALANCE: Primary | ICD-10-CM

## 2025-08-01 DIAGNOSIS — M54.41 CHRONIC RIGHT-SIDED LOW BACK PAIN WITH RIGHT-SIDED SCIATICA: ICD-10-CM

## 2025-08-01 DIAGNOSIS — G20.C PARKINSONISM, UNSPECIFIED PARKINSONISM TYPE (MULTI): ICD-10-CM

## 2025-08-01 DIAGNOSIS — R42 EPISODE OF DIZZINESS: ICD-10-CM

## 2025-08-01 DIAGNOSIS — G89.29 CHRONIC RIGHT-SIDED LOW BACK PAIN WITH RIGHT-SIDED SCIATICA: ICD-10-CM

## 2025-08-01 PROCEDURE — 97112 NEUROMUSCULAR REEDUCATION: CPT | Mod: GP | Performed by: PHYSICAL THERAPIST

## 2025-08-01 PROCEDURE — 97110 THERAPEUTIC EXERCISES: CPT | Mod: GP | Performed by: PHYSICAL THERAPIST

## 2025-08-01 ASSESSMENT — PAIN DESCRIPTION - DESCRIPTORS: DESCRIPTORS: ACHING

## 2025-08-01 ASSESSMENT — PAIN SCALES - GENERAL: PAINLEVEL_OUTOF10: 6

## 2025-08-01 ASSESSMENT — PAIN - FUNCTIONAL ASSESSMENT: PAIN_FUNCTIONAL_ASSESSMENT: 0-10

## 2025-08-01 NOTE — PROGRESS NOTES
"-Physical Therapy    Physical Therapy Treatment    Patient Name: rCisty Forte  MRN: [id number]  Today's Date: 8/1/2025  Medicare  Visit number=4   Primary dx=imbalance  Time Entry:   Time Calculation  Start Time: 1121  Stop Time: 1204  Time Calculation (min): 43 min     PT Therapeutic Procedures Time Entry  Therapeutic Exercise Time Entry: 8  Neuromuscular Re-Education Time Entry: 35      Assessment:  Pt. Continues to be limited in progressing dynamic balance due to multiple orthopedic co-morbidities including bilateral kene pain and right sided sciatic pain. The patient is demonstrating reduced HEP repetition which may lead to decreased prognosis for improvement.  Pt. Reported mild low back pain following standing exercise this date, which was alleviated with repeated lumbar flexion.         Plan:  review HEP, large amplitude exercises        Current Problem  1. Imbalance  Follow Up In Physical Therapy      2. Parkinsonism, unspecified Parkinsonism type (Multi)  Follow Up In Physical Therapy      3. Episode of dizziness  Follow Up In Physical Therapy      4. Chronic right-sided low back pain with right-sided sciatica                  Subjective    Pt. Reports that he hasn't done his exercises as much as he should. His sciatic pain and knees have been bothering him.  His knees bother him more.  Yesterday his sciatic pain when from his buttocks to his knee.  He has a treadmill but the railings aren't long enough    Precautions:  mild fall risk     Vital Signs: not taken      Pain: 6/10 bilateral knee pain, no sciatic pain right now.    Pain Assessment  Pain Assessment: 0-10  0-10 (Numeric) Pain Score: 6  Pain Type: Chronic pain  Pain Location: Knee  Pain Orientation: Left, Right  Pain Descriptors: Aching  Pain Frequency: Constant/continuous  Objective      Pt. Arrived to visit with no device    Treatments:    There Ex:  -Quad sets x20 each side with 5\" hold  -educated pt. On benefits of performing cardiovascular " "exercise with pedal bike to reduce knee stiffness and promote cardiovascular health  -repeated lumbar flexion with stability ball to the L in sitting x20 with 5\"hold    Neuro Re-Ed:  -sit<>Stands with FA on foam x10 with VC for ant. WS with no UE support (elevated mat table 23 inches)  -standing PWR! Twist x10 each side  -standing large amplitude stomping     OP EDUCATION:  -upright posture 5\" holds  -standing PWR!  -LAQ x10 each side with 5\" hold  -seated isometric adduction x10 with 5\"hold  -seated hip ABD with band x10 with 5\" hold  -standing HS stretch at stairs 30\"x2 each side  -trunk flexion on diagonal to the left only         Goals:  Active       PT Problem       PT Goal 1: Pt. Will improve EC on firm condition of MCTSIB to indicate improved utilization of proprioceptive cues for balance by end of POC.        Start:  06/13/25    Expected End:  09/08/25            PT Goal 2: Pt. will be able to complete 5x sit<>stand test without upper extremity support by end of POC to indicate improved lower extremity strength.       Start:  06/13/25    Expected End:  09/08/25            PT Goal 3: Pt. Will improve FGA by 4 points in order to meet MCID for significant change compared to normative values and indicate improved dynamic balance.        Start:  06/13/25    Expected End:  09/08/25            PT Goal 4: Pt. will complete 6MWT in next 1-2 visits       Start:  06/13/25    Expected End:  09/08/25            PT Goal 5: Pt. Will be independent in HEP in next 1-2 visits to promote self efficacy, manage symptoms and meet other LTG.        Start:  06/13/25    Expected End:  09/08/25            Patient Stated Goal 1       Start:  06/13/25    Expected End:  09/08/25       Pt. Goal: \"strengthen legs, better balance\"               "

## 2025-08-02 DIAGNOSIS — N40.1 BENIGN PROSTATIC HYPERPLASIA WITH LOWER URINARY TRACT SYMPTOMS, SYMPTOM DETAILS UNSPECIFIED: ICD-10-CM

## 2025-08-05 RX ORDER — FINASTERIDE 5 MG/1
5 TABLET, FILM COATED ORAL DAILY
Qty: 90 TABLET | Refills: 1 | Status: SHIPPED | OUTPATIENT
Start: 2025-08-05

## 2025-08-08 ENCOUNTER — TREATMENT (OUTPATIENT)
Dept: PHYSICAL THERAPY | Facility: CLINIC | Age: 80
End: 2025-08-08
Payer: MEDICARE

## 2025-08-08 DIAGNOSIS — R26.89 IMBALANCE: Primary | ICD-10-CM

## 2025-08-08 DIAGNOSIS — R42 EPISODE OF DIZZINESS: ICD-10-CM

## 2025-08-08 DIAGNOSIS — G20.C PARKINSONISM, UNSPECIFIED PARKINSONISM TYPE (MULTI): ICD-10-CM

## 2025-08-08 PROCEDURE — 97112 NEUROMUSCULAR REEDUCATION: CPT | Mod: GP | Performed by: PHYSICAL THERAPIST

## 2025-08-08 ASSESSMENT — PAIN SCALES - GENERAL
PAINLEVEL_OUTOF10: 7
PAINLEVEL_OUTOF10: 7

## 2025-08-08 ASSESSMENT — PAIN - FUNCTIONAL ASSESSMENT: PAIN_FUNCTIONAL_ASSESSMENT: 0-10

## 2025-08-08 NOTE — PROGRESS NOTES
-Physical Therapy    Physical Therapy Treatment    Patient Name: Cristy Forte  MRN: [id number]  Today's Date: 8/8/2025  Medicare  Visit number=5   Primary dx=imbalance  Time Entry:   Time Calculation  Start Time: 1035  Stop Time: 1113  Time Calculation (min): 38 min     PT Therapeutic Procedures Time Entry  Neuromuscular Re-Education Time Entry: 38      Assessment:  Pt. Returned today with reduced bilateral knee and low back pain with sciatica.  The patient was able to progress to completing dynamic balance today with no increased pain.  The patient fatigued quickly with standing exercises requiring rest breaks ever 2-3 exercises.  Pt. Was challenged with hurdles on foam however with practice the patient improved.        Plan:  review HEP, large amplitude exercises        Current Problem  1. Imbalance  Follow Up In Physical Therapy      2. Parkinsonism, unspecified Parkinsonism type (Multi)  Follow Up In Physical Therapy      3. Episode of dizziness  Follow Up In Physical Therapy                Subjective    Pt. Reports that he has been bothered by the swelling in his hands and feet.  His back and knees have been a little bit better. He got a ball but has to blow it up.    Precautions:  mild fall risk     Vital Signs: not taken      Pain: hands and feet 7/10  Pain Assessment  Pain Assessment: 0-10  0-10 (Numeric) Pain Score: 7  Pain Type: Chronic pain  Pain Location: Hand  Pain Orientation: Left, Right  Multiple Pain Sites: Two  Pain 2  Pain Score 2: 7  Pain Type 2: Chronic pain  Pain Location 2: Foot  Pain Orientation 2: Left, Right  Objective      Pt. Arrived to visit with no device    Treatments:    Neuro Re-Ed:  -sit<>Stands with FA on foam x10 with ball press up with yellow ball  PWR standing:   -posture x10 with YTB   -WS x10 each side  -standing PWR! Twist x10 each side  -transition x5   -standing large amplitude stomping F/B x10 on each side  -forward rock and reach x10   -hurdles with foam F direction x6  "laps with CGA (VC for wide RENE)  -hurdles with foam lateral direction x2 laps each with CGA (VC for glut/quad activation)    OP EDUCATION:  -upright posture 5\" holds  -standing PWR!  -LAQ x10 each side with 5\" hold  -seated isometric adduction x10 with 5\"hold  -seated hip ABD with band x10 with 5\" hold  -standing HS stretch at stairs 30\"x2 each side  -trunk flexion on diagonal to the left only  -sit<>stands with FA on foam  -large amplitude stepping forward  -rocking F/B         Goals:  Active       PT Problem       PT Goal 1: Pt. Will improve EC on firm condition of MCTSIB to indicate improved utilization of proprioceptive cues for balance by end of POC.        Start:  06/13/25    Expected End:  09/08/25            PT Goal 2: Pt. will be able to complete 5x sit<>stand test without upper extremity support by end of POC to indicate improved lower extremity strength.       Start:  06/13/25    Expected End:  09/08/25            PT Goal 3: Pt. Will improve FGA by 4 points in order to meet MCID for significant change compared to normative values and indicate improved dynamic balance.        Start:  06/13/25    Expected End:  09/08/25            PT Goal 4: Pt. will complete 6MWT in next 1-2 visits       Start:  06/13/25    Expected End:  09/08/25            PT Goal 5: Pt. Will be independent in HEP in next 1-2 visits to promote self efficacy, manage symptoms and meet other LTG.        Start:  06/13/25    Expected End:  09/08/25            Patient Stated Goal 1       Start:  06/13/25    Expected End:  09/08/25       Pt. Goal: \"strengthen legs, better balance\"                 "

## 2025-08-15 ENCOUNTER — DOCUMENTATION (OUTPATIENT)
Dept: PHYSICAL THERAPY | Facility: CLINIC | Age: 80
End: 2025-08-15
Payer: MEDICARE

## 2025-08-15 ENCOUNTER — APPOINTMENT (OUTPATIENT)
Dept: PHYSICAL THERAPY | Facility: CLINIC | Age: 80
End: 2025-08-15
Payer: MEDICARE

## 2025-08-20 ENCOUNTER — TREATMENT (OUTPATIENT)
Dept: PHYSICAL THERAPY | Facility: CLINIC | Age: 80
End: 2025-08-20
Payer: MEDICARE

## 2025-08-20 DIAGNOSIS — G20.C PARKINSONISM, UNSPECIFIED PARKINSONISM TYPE (MULTI): ICD-10-CM

## 2025-08-20 DIAGNOSIS — R26.89 IMBALANCE: Primary | ICD-10-CM

## 2025-08-20 DIAGNOSIS — R42 EPISODE OF DIZZINESS: ICD-10-CM

## 2025-08-20 PROCEDURE — 97140 MANUAL THERAPY 1/> REGIONS: CPT | Mod: GP | Performed by: PHYSICAL THERAPIST

## 2025-08-20 PROCEDURE — 95992 CANALITH REPOSITIONING PROC: CPT | Mod: GP | Performed by: PHYSICAL THERAPIST

## 2025-08-20 PROCEDURE — 97110 THERAPEUTIC EXERCISES: CPT | Mod: GP | Performed by: PHYSICAL THERAPIST

## 2025-08-22 ENCOUNTER — APPOINTMENT (OUTPATIENT)
Dept: PHYSICAL THERAPY | Facility: CLINIC | Age: 80
End: 2025-08-22
Payer: MEDICARE

## 2025-08-29 ENCOUNTER — APPOINTMENT (OUTPATIENT)
Dept: PHYSICAL THERAPY | Facility: CLINIC | Age: 80
End: 2025-08-29
Payer: MEDICARE

## 2025-09-03 ENCOUNTER — APPOINTMENT (OUTPATIENT)
Dept: PRIMARY CARE | Facility: CLINIC | Age: 80
End: 2025-09-03
Payer: MEDICARE

## 2026-01-29 ENCOUNTER — APPOINTMENT (OUTPATIENT)
Dept: OPHTHALMOLOGY | Facility: CLINIC | Age: 81
End: 2026-01-29
Payer: MEDICARE